# Patient Record
Sex: MALE | Race: WHITE | NOT HISPANIC OR LATINO | URBAN - METROPOLITAN AREA
[De-identification: names, ages, dates, MRNs, and addresses within clinical notes are randomized per-mention and may not be internally consistent; named-entity substitution may affect disease eponyms.]

---

## 2023-08-18 PROBLEM — R14.0 ABDOMINAL DISTENTION: Status: ACTIVE | Noted: 2023-08-18

## 2023-08-18 PROBLEM — N20.0 NEPHROLITHIASIS: Status: ACTIVE | Noted: 2023-08-18

## 2023-08-18 PROBLEM — E11.65 HYPERGLYCEMIA DUE TO TYPE 2 DIABETES MELLITUS (MULTI): Status: ACTIVE | Noted: 2023-08-18

## 2023-08-18 PROBLEM — J45.909 ASTHMA (HHS-HCC): Status: ACTIVE | Noted: 2023-08-18

## 2023-08-18 PROBLEM — K57.90 DIVERTICULOSIS: Status: ACTIVE | Noted: 2023-08-18

## 2023-08-18 PROBLEM — J20.9 ACUTE BRONCHITIS: Status: ACTIVE | Noted: 2023-08-18

## 2023-08-18 PROBLEM — F32.A DEPRESSION: Status: ACTIVE | Noted: 2023-08-18

## 2023-08-18 PROBLEM — K59.00 CONSTIPATION: Status: ACTIVE | Noted: 2023-08-18

## 2023-08-18 PROBLEM — B35.4 TINEA CORPORIS: Status: ACTIVE | Noted: 2023-08-18

## 2023-08-18 PROBLEM — K21.9 GASTROESOPHAGEAL REFLUX DISEASE WITHOUT ESOPHAGITIS: Status: ACTIVE | Noted: 2023-08-18

## 2023-08-18 PROBLEM — M54.41 ACUTE BILATERAL LOW BACK PAIN WITH RIGHT-SIDED SCIATICA: Status: ACTIVE | Noted: 2023-08-18

## 2023-08-18 PROBLEM — R10.9 ABDOMINAL PAIN: Status: ACTIVE | Noted: 2023-08-18

## 2023-08-18 PROBLEM — F41.9 ANXIETY: Status: ACTIVE | Noted: 2023-08-18

## 2023-08-18 PROBLEM — H65.90 OTITIS, SEROUS: Status: ACTIVE | Noted: 2023-08-18

## 2023-08-18 PROBLEM — R06.09 DYSPNEA ON EXERTION: Status: ACTIVE | Noted: 2023-08-18

## 2023-08-18 PROBLEM — E78.2 MIXED HYPERLIPIDEMIA: Status: ACTIVE | Noted: 2023-08-18

## 2023-08-18 PROBLEM — R00.2 HEART PALPITATIONS: Status: ACTIVE | Noted: 2023-08-18

## 2023-08-18 PROBLEM — K57.92 DIVERTICULITIS: Status: ACTIVE | Noted: 2023-08-18

## 2023-08-18 PROBLEM — J01.40 ACUTE PANSINUSITIS: Status: ACTIVE | Noted: 2023-08-18

## 2023-08-18 RX ORDER — ALBUTEROL SULFATE 90 UG/1
2 AEROSOL, METERED RESPIRATORY (INHALATION) EVERY 4 HOURS PRN
COMMUNITY
Start: 2018-10-22 | End: 2023-10-03

## 2023-08-18 RX ORDER — VITAMIN E (DL,TOCOPHERYL ACET) 90 MG
1 CAPSULE ORAL DAILY
COMMUNITY
End: 2023-10-03

## 2023-08-18 RX ORDER — FLUOXETINE HYDROCHLORIDE 40 MG/1
40 CAPSULE ORAL DAILY
COMMUNITY
End: 2023-10-03

## 2023-08-18 RX ORDER — MONTELUKAST SODIUM 10 MG/1
10 TABLET ORAL
Status: ON HOLD | COMMUNITY
Start: 2016-11-11 | End: 2024-02-07

## 2023-08-18 RX ORDER — METFORMIN HYDROCHLORIDE 500 MG/1
1 TABLET ORAL
COMMUNITY
Start: 2017-09-15 | End: 2023-10-03

## 2023-08-18 RX ORDER — SOD SULF/POT CHLORIDE/MAG SULF 1.479 G
TABLET ORAL
COMMUNITY
Start: 2022-12-14 | End: 2023-10-03

## 2023-08-18 RX ORDER — LISINOPRIL 5 MG/1
5 TABLET ORAL DAILY
COMMUNITY
Start: 2022-10-26 | End: 2023-10-03

## 2023-08-18 RX ORDER — ZOLPIDEM TARTRATE 5 MG/1
TABLET ORAL
COMMUNITY
Start: 2022-08-30 | End: 2023-10-03

## 2023-08-18 RX ORDER — ATORVASTATIN CALCIUM 20 MG/1
20 TABLET, FILM COATED ORAL DAILY
COMMUNITY
Start: 2016-11-11

## 2023-08-18 RX ORDER — METFORMIN HYDROCHLORIDE 500 MG/1
1000 TABLET, EXTENDED RELEASE ORAL
COMMUNITY
Start: 2023-04-04 | End: 2023-10-03

## 2023-08-18 RX ORDER — DULAGLUTIDE 0.75 MG/.5ML
INJECTION, SOLUTION SUBCUTANEOUS
COMMUNITY
Start: 2022-09-27 | End: 2023-10-03

## 2023-08-18 RX ORDER — GLIPIZIDE 10 MG/1
10 TABLET, FILM COATED, EXTENDED RELEASE ORAL
COMMUNITY
Start: 2023-04-28 | End: 2023-10-03

## 2023-08-18 RX ORDER — OXYCODONE AND ACETAMINOPHEN 5; 325 MG/1; MG/1
1 TABLET ORAL
COMMUNITY
Start: 2023-06-08 | End: 2023-10-03

## 2023-08-18 RX ORDER — CLORAZEPATE DIPOTASSIUM 15 MG/1
.5-1 TABLET ORAL EVERY 6 HOURS PRN
COMMUNITY
Start: 2016-11-11 | End: 2023-10-03

## 2023-08-18 RX ORDER — OMEPRAZOLE 20 MG/1
1 TABLET, DELAYED RELEASE ORAL DAILY
COMMUNITY

## 2023-08-18 RX ORDER — TRAZODONE HYDROCHLORIDE 50 MG/1
50 TABLET ORAL DAILY
COMMUNITY
End: 2023-10-31 | Stop reason: SDUPTHER

## 2023-08-18 RX ORDER — FLUTICASONE PROPIONATE AND SALMETEROL 100; 50 UG/1; UG/1
POWDER RESPIRATORY (INHALATION)
COMMUNITY
Start: 2022-08-31 | End: 2023-10-03

## 2023-08-18 RX ORDER — FLUOXETINE HYDROCHLORIDE 20 MG/1
CAPSULE ORAL
COMMUNITY
Start: 2022-08-31 | End: 2023-10-03

## 2023-08-18 RX ORDER — OMEPRAZOLE 20 MG/1
20 CAPSULE, DELAYED RELEASE ORAL
COMMUNITY
End: 2023-10-03

## 2023-08-18 RX ORDER — LORAZEPAM 0.5 MG/1
0.5 TABLET ORAL DAILY PRN
COMMUNITY
Start: 2023-04-04 | End: 2023-10-03

## 2023-08-18 RX ORDER — FLUTICASONE PROPIONATE AND SALMETEROL 250; 50 UG/1; UG/1
1 POWDER RESPIRATORY (INHALATION)
COMMUNITY
End: 2023-12-18

## 2023-08-18 RX ORDER — ATORVASTATIN CALCIUM 20 MG/1
20 TABLET, FILM COATED ORAL DAILY
COMMUNITY
End: 2023-10-03

## 2023-08-18 RX ORDER — LORATADINE 10 MG/1
CAPSULE, LIQUID FILLED ORAL
COMMUNITY
Start: 2022-08-31 | End: 2023-10-03

## 2023-08-18 RX ORDER — ACETAMINOPHEN 160 MG/5ML
400 SUSPENSION, ORAL (FINAL DOSE FORM) ORAL DAILY
Status: ON HOLD | COMMUNITY
End: 2024-02-08 | Stop reason: ALTCHOICE

## 2023-09-06 ENCOUNTER — HOSPITAL ENCOUNTER (OUTPATIENT)
Dept: DATA CONVERSION | Facility: HOSPITAL | Age: 58
Discharge: OTHER INSTITUTION | End: 2023-09-06
Payer: COMMERCIAL

## 2023-09-06 DIAGNOSIS — E16.2 HYPOGLYCEMIA, UNSPECIFIED: ICD-10-CM

## 2023-09-06 LAB — GLUCOSE BLD STRIP.AUTO-MCNC: 159 MG/DL (ref 65–99)

## 2023-09-10 RX ORDER — CIPROFLOXACIN 500 MG/1
500 TABLET ORAL 2 TIMES DAILY
COMMUNITY
Start: 2023-07-25 | End: 2023-10-03

## 2023-09-10 RX ORDER — GLIMEPIRIDE 2 MG/1
1 TABLET ORAL DAILY
COMMUNITY
Start: 2023-08-16

## 2023-10-03 ENCOUNTER — APPOINTMENT (OUTPATIENT)
Dept: PRIMARY CARE | Facility: CLINIC | Age: 58
End: 2023-10-03
Payer: COMMERCIAL

## 2023-10-03 ENCOUNTER — OFFICE VISIT (OUTPATIENT)
Dept: PRIMARY CARE | Facility: CLINIC | Age: 58
End: 2023-10-03
Payer: COMMERCIAL

## 2023-10-03 VITALS
HEIGHT: 72 IN | DIASTOLIC BLOOD PRESSURE: 80 MMHG | OXYGEN SATURATION: 98 % | SYSTOLIC BLOOD PRESSURE: 142 MMHG | BODY MASS INDEX: 34.64 KG/M2 | HEART RATE: 97 BPM | WEIGHT: 255.73 LBS

## 2023-10-03 DIAGNOSIS — F32.A DEPRESSION, UNSPECIFIED DEPRESSION TYPE: ICD-10-CM

## 2023-10-03 DIAGNOSIS — N52.9 ERECTILE DYSFUNCTION, UNSPECIFIED ERECTILE DYSFUNCTION TYPE: ICD-10-CM

## 2023-10-03 DIAGNOSIS — I10 BENIGN ESSENTIAL HTN: ICD-10-CM

## 2023-10-03 DIAGNOSIS — E11.65 TYPE 2 DIABETES MELLITUS WITH HYPERGLYCEMIA, WITHOUT LONG-TERM CURRENT USE OF INSULIN (MULTI): ICD-10-CM

## 2023-10-03 DIAGNOSIS — Z00.00 ANNUAL PHYSICAL EXAM: Primary | ICD-10-CM

## 2023-10-03 DIAGNOSIS — F41.9 ANXIETY: ICD-10-CM

## 2023-10-03 PROCEDURE — 99215 OFFICE O/P EST HI 40 MIN: CPT | Performed by: NURSE PRACTITIONER

## 2023-10-03 PROCEDURE — 3077F SYST BP >= 140 MM HG: CPT | Performed by: NURSE PRACTITIONER

## 2023-10-03 PROCEDURE — 3079F DIAST BP 80-89 MM HG: CPT | Performed by: NURSE PRACTITIONER

## 2023-10-03 PROCEDURE — 4010F ACE/ARB THERAPY RXD/TAKEN: CPT | Performed by: NURSE PRACTITIONER

## 2023-10-03 PROCEDURE — 1036F TOBACCO NON-USER: CPT | Performed by: NURSE PRACTITIONER

## 2023-10-03 RX ORDER — FLUOXETINE HYDROCHLORIDE 20 MG/1
20 CAPSULE ORAL DAILY
COMMUNITY
End: 2023-10-31 | Stop reason: SDUPTHER

## 2023-10-03 RX ORDER — TADALAFIL 10 MG/1
10 TABLET ORAL DAILY PRN
Qty: 12 TABLET | Refills: 3 | Status: SHIPPED | OUTPATIENT
Start: 2023-10-03 | End: 2023-11-28 | Stop reason: WASHOUT

## 2023-10-03 RX ORDER — LISINOPRIL 5 MG/1
5 TABLET ORAL DAILY
Qty: 90 TABLET | Refills: 3 | Status: SHIPPED | OUTPATIENT
Start: 2023-10-03 | End: 2024-01-25 | Stop reason: WASHOUT

## 2023-10-03 ASSESSMENT — ENCOUNTER SYMPTOMS
DYSURIA: 0
PALPITATIONS: 0
SEIZURES: 0
EYE PAIN: 0
ACTIVITY CHANGE: 0
RHINORRHEA: 0
DIARRHEA: 0
DYSPHORIC MOOD: 0
ENDOCRINE NEGATIVE: 1
BLOOD IN STOOL: 0
FEVER: 0
CONSTIPATION: 0
WOUND: 0
DIZZINESS: 0
FREQUENCY: 0
SLEEP DISTURBANCE: 0
NAUSEA: 0
ABDOMINAL PAIN: 0
UNEXPECTED WEIGHT CHANGE: 0
VOMITING: 0
SORE THROAT: 0
JOINT SWELLING: 0
SHORTNESS OF BREATH: 0
COUGH: 0
HEMATURIA: 0

## 2023-10-03 NOTE — PROGRESS NOTES
Subjective   Patient ID: Shaheen Ambriz is a 58 y.o. male who presents for Establish Care. He is doing well, no concerns.     HPI   He is complaining of some ED. Difficulty obtaining erection. Denies any painful urination, blood in the urine.   DMT2: followed by endo Dr. Hunt.   Diverticulosis: Followed by Dr. Craig. Recent colonoscopy stable. 4 Diverticulitis episodes since 10/2022. Diet changes and ducolax daily to keep bowels regular.   SOCIAL: Living at home with wife. 2 adult children. 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    Review of Systems   Constitutional:  Negative for activity change, fever and unexpected weight change.   HENT:  Negative for ear pain, hearing loss, rhinorrhea and sore throat.    Eyes:  Negative for pain and visual disturbance.   Respiratory:  Negative for cough and shortness of breath.    Cardiovascular:  Negative for chest pain, palpitations and leg swelling.   Gastrointestinal:  Negative for abdominal pain, blood in stool, constipation, diarrhea, nausea and vomiting.   Endocrine: Negative.    Genitourinary:  Negative for dysuria, frequency and hematuria.   Musculoskeletal:  Negative for gait problem and joint swelling.   Skin:  Negative for rash and wound.   Allergic/Immunologic: Negative for immunocompromised state.   Neurological:  Negative for dizziness, seizures and syncope.   Psychiatric/Behavioral:  Negative for behavioral problems, dysphoric mood and sleep disturbance.      Objective   /80   Pulse 97   Ht 1.829 m (6')   Wt 116 kg (255 lb 11.7 oz)   SpO2 98%   BMI 34.68 kg/m²     Physical Exam  Constitutional:       Appearance: Normal appearance.   HENT:      Head: Normocephalic.      Right Ear: Tympanic membrane, ear canal and external ear normal.      Left Ear: Tympanic membrane, ear canal and external ear normal.      Nose: Nose normal.      Mouth/Throat:      Mouth: Mucous membranes are moist.      Pharynx: Oropharynx is clear.   Eyes:       Extraocular Movements: Extraocular movements intact.      Conjunctiva/sclera: Conjunctivae normal.      Pupils: Pupils are equal, round, and reactive to light.   Cardiovascular:      Rate and Rhythm: Normal rate and regular rhythm.      Pulses: Normal pulses.      Heart sounds: Normal heart sounds.   Pulmonary:      Effort: Pulmonary effort is normal.      Breath sounds: Normal breath sounds.   Abdominal:      General: Abdomen is flat. Bowel sounds are normal.      Palpations: Abdomen is soft.   Musculoskeletal:         General: Normal range of motion.   Skin:     General: Skin is warm and dry.   Neurological:      General: No focal deficit present.      Mental Status: He is alert and oriented to person, place, and time.   Psychiatric:         Mood and Affect: Mood normal.         Behavior: Behavior normal.       Assessment/Plan   Diagnoses and all orders for this visit:  Annual physical exam  Erectile dysfunction, unspecified erectile dysfunction type  -     Testosterone, total and free; Future - pt will be seeing endo next week   -     tadalafil (Cialis) 10 mg tablet; Take 1 tablet (10 mg) by mouth once daily as needed for erectile dysfunction (10 mg as a single dose =30 minutes prior to anticipated sexual activity; do not take more than once daily. Erectile function may be improved for up to 36 hours following a single dose).  Type 2 diabetes mellitus with hyperglycemia, without long-term current use of insulin (CMS/[facility])  -     Hemoglobin A1C; Future  Stable on glimepiride 2 mg every day   Depression, unspecified depression type, Anxiety: stable on prozac 20 mg every day   Benign essential HTN  -     lisinopril 5 mg tablet; Take 1 tablet (5 mg) by mouth once daily.  Health maintenance: colonoscopy due next 01/2028. PSA UTD 05/2023. Immunizations UTD - flu vac at work. Would like to get shingrix on Nov 3rd 4 pm.   FU annually for CPE due 05/2024

## 2023-10-06 DIAGNOSIS — Z23 IMMUNIZATION DUE: Primary | ICD-10-CM

## 2023-10-12 ENCOUNTER — LAB (OUTPATIENT)
Dept: LAB | Facility: LAB | Age: 58
End: 2023-10-12
Payer: COMMERCIAL

## 2023-10-12 DIAGNOSIS — N52.9 ERECTILE DYSFUNCTION, UNSPECIFIED ERECTILE DYSFUNCTION TYPE: ICD-10-CM

## 2023-10-12 DIAGNOSIS — E11.65 TYPE 2 DIABETES MELLITUS WITH HYPERGLYCEMIA, WITHOUT LONG-TERM CURRENT USE OF INSULIN (MULTI): ICD-10-CM

## 2023-10-12 LAB
EST. AVERAGE GLUCOSE BLD GHB EST-MCNC: 157 MG/DL
HBA1C MFR BLD: 7.1 %

## 2023-10-12 PROCEDURE — 36415 COLL VENOUS BLD VENIPUNCTURE: CPT

## 2023-10-12 PROCEDURE — 84402 ASSAY OF FREE TESTOSTERONE: CPT

## 2023-10-12 PROCEDURE — 83036 HEMOGLOBIN GLYCOSYLATED A1C: CPT

## 2023-10-13 ENCOUNTER — OFFICE VISIT (OUTPATIENT)
Dept: ENDOCRINOLOGY | Facility: CLINIC | Age: 58
End: 2023-10-13
Payer: COMMERCIAL

## 2023-10-13 VITALS — BODY MASS INDEX: 35.4 KG/M2 | SYSTOLIC BLOOD PRESSURE: 144 MMHG | DIASTOLIC BLOOD PRESSURE: 86 MMHG | WEIGHT: 261 LBS

## 2023-10-13 DIAGNOSIS — E78.2 MIXED HYPERLIPIDEMIA: ICD-10-CM

## 2023-10-13 DIAGNOSIS — E11.9 TYPE 2 DIABETES MELLITUS WITHOUT COMPLICATION, WITHOUT LONG-TERM CURRENT USE OF INSULIN (MULTI): Primary | ICD-10-CM

## 2023-10-13 DIAGNOSIS — I10 BENIGN ESSENTIAL HTN: ICD-10-CM

## 2023-10-13 PROCEDURE — 3077F SYST BP >= 140 MM HG: CPT | Performed by: INTERNAL MEDICINE

## 2023-10-13 PROCEDURE — 3051F HG A1C>EQUAL 7.0%<8.0%: CPT | Performed by: INTERNAL MEDICINE

## 2023-10-13 PROCEDURE — 4010F ACE/ARB THERAPY RXD/TAKEN: CPT | Performed by: INTERNAL MEDICINE

## 2023-10-13 PROCEDURE — 3079F DIAST BP 80-89 MM HG: CPT | Performed by: INTERNAL MEDICINE

## 2023-10-13 PROCEDURE — 99214 OFFICE O/P EST MOD 30 MIN: CPT | Performed by: INTERNAL MEDICINE

## 2023-10-13 PROCEDURE — 1036F TOBACCO NON-USER: CPT | Performed by: INTERNAL MEDICINE

## 2023-10-13 NOTE — PROGRESS NOTES
Patient ID: Shaheen Ambriz is a 58 y.o. male who presents for No chief complaint on file..  HPI  The patient comes in for follow up.    He has type 2 diabetes with no complications hypertension hyperlipidemia.    At the last appointment we tried metformin ER which she did not tolerate.    He had been on Jardiance but did not restart it.    He has been intolerant to Trulicity because of GI side effects.    He had been on glipizide ER 10 mg which caused a lot of hypoglycemia.    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    We added in glimepiride 2 mg which she has tolerated and has had no hypoglycemia with.    He has not been consistently checking his blood sugars.    He is going to be starting on lisinopril 5 mg through his primary care doctor.    Had a hemoglobin A1c of 7.1 yesterday.      ROS  Comprehensive review of systems is negative.    Objective   Physical Exam  Weight 261 up 9 pounds    ENT normal. No adenopathy  Fundi normal  Thyroid palpable and normal. No nodules  Chest clear to auscultation  Heart sounds are normal  Abdomen nontender. Bowel sounds normal. No organomegaly  Feet are okay  Normal vibration and monofilament sensation normal pulses, no lesions    Assessment/Plan     1.  Type 2 diabetes  2.  Hypertension  3.  Hyperlipidemia    We reviewed his hemoglobin A1c.    He will continue work on diet and exercise.    We will add back in Jardiance 25 mg.    We will decrease the glimepiride to 1/2 tablet/day.    He will keep an eye on his blood sugars.    We will watch for hypoglycemia.    He will follow-up with me in 3 months sooner as needed.

## 2023-10-17 LAB
TESTOSTERONE FREE (CHAN): 53.6 PG/ML (ref 35–155)
TESTOSTERONE,TOTAL,LC-MS/MS: 259 NG/DL (ref 250–1100)

## 2023-10-31 ENCOUNTER — PATIENT MESSAGE (OUTPATIENT)
Dept: PRIMARY CARE | Facility: CLINIC | Age: 58
End: 2023-10-31

## 2023-10-31 DIAGNOSIS — F32.A DEPRESSION, UNSPECIFIED DEPRESSION TYPE: ICD-10-CM

## 2023-10-31 DIAGNOSIS — F32.A DEPRESSION, UNSPECIFIED DEPRESSION TYPE: Primary | ICD-10-CM

## 2023-10-31 RX ORDER — TRAZODONE HYDROCHLORIDE 50 MG/1
50 TABLET ORAL DAILY
Qty: 90 TABLET | Refills: 0 | Status: SHIPPED | OUTPATIENT
Start: 2023-10-31 | End: 2024-01-29

## 2023-10-31 RX ORDER — FLUOXETINE HYDROCHLORIDE 20 MG/1
20 CAPSULE ORAL DAILY
Qty: 90 CAPSULE | Refills: 0 | Status: SHIPPED | OUTPATIENT
Start: 2023-10-31 | End: 2023-12-18 | Stop reason: SDUPTHER

## 2023-11-03 ENCOUNTER — APPOINTMENT (OUTPATIENT)
Dept: PRIMARY CARE | Facility: CLINIC | Age: 58
End: 2023-11-03
Payer: COMMERCIAL

## 2023-11-08 ENCOUNTER — OFFICE VISIT (OUTPATIENT)
Dept: PRIMARY CARE | Facility: CLINIC | Age: 58
End: 2023-11-08
Payer: COMMERCIAL

## 2023-11-08 VITALS
HEART RATE: 87 BPM | WEIGHT: 251.32 LBS | OXYGEN SATURATION: 99 % | DIASTOLIC BLOOD PRESSURE: 78 MMHG | BODY MASS INDEX: 34.09 KG/M2 | SYSTOLIC BLOOD PRESSURE: 132 MMHG

## 2023-11-08 DIAGNOSIS — K57.32 DIVERTICULITIS OF COLON: Primary | ICD-10-CM

## 2023-11-08 PROCEDURE — 3078F DIAST BP <80 MM HG: CPT | Performed by: NURSE PRACTITIONER

## 2023-11-08 PROCEDURE — 3075F SYST BP GE 130 - 139MM HG: CPT | Performed by: NURSE PRACTITIONER

## 2023-11-08 PROCEDURE — 99213 OFFICE O/P EST LOW 20 MIN: CPT | Performed by: NURSE PRACTITIONER

## 2023-11-08 PROCEDURE — 4010F ACE/ARB THERAPY RXD/TAKEN: CPT | Performed by: NURSE PRACTITIONER

## 2023-11-08 PROCEDURE — 1036F TOBACCO NON-USER: CPT | Performed by: NURSE PRACTITIONER

## 2023-11-08 PROCEDURE — 3051F HG A1C>EQUAL 7.0%<8.0%: CPT | Performed by: NURSE PRACTITIONER

## 2023-11-08 ASSESSMENT — ENCOUNTER SYMPTOMS
WOUND: 0
ENDOCRINE NEGATIVE: 1
SORE THROAT: 0
PALPITATIONS: 0
HEMATURIA: 0
ABDOMINAL PAIN: 0
RHINORRHEA: 0
DYSPHORIC MOOD: 0
COUGH: 0
SEIZURES: 0
ACTIVITY CHANGE: 0
CONSTIPATION: 0
DYSURIA: 0
SHORTNESS OF BREATH: 0
DIARRHEA: 0
EYE PAIN: 0
VOMITING: 0
FEVER: 0
FREQUENCY: 0
JOINT SWELLING: 0
DIZZINESS: 0
UNEXPECTED WEIGHT CHANGE: 0
BLOOD IN STOOL: 0
SLEEP DISTURBANCE: 0
NAUSEA: 0

## 2023-11-08 NOTE — PROGRESS NOTES
Subjective   Patient ID: Shaheen Ambriz is a 58 y.o. male who presents for 7th diverticulitis flare in the past year. Symptoms started 11/2/2023, taking flagyl and Cipro, no blood in stool, symptoms are improving. Followed by Dr. Josue Craig. Colonoscopy done 01/26/2023. He has not done well with high fiber diet due to constipation.     HPI   He is complaining of some ED. Difficulty obtaining erection. Denies any painful urination, blood in the urine.   DMT2: followed by endo Dr. Hunt.   Diverticulosis: Followed by Dr. Craig. Recent colonoscopy stable. 4 Diverticulitis episodes since 10/2022. Diet changes and ducolax daily to keep bowels regular.   SOCIAL: Living at home with wife. 2 adult children. 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    Review of Systems   Constitutional:  Negative for activity change, fever and unexpected weight change.   HENT:  Negative for ear pain, hearing loss, rhinorrhea and sore throat.    Eyes:  Negative for pain and visual disturbance.   Respiratory:  Negative for cough and shortness of breath.    Cardiovascular:  Negative for chest pain, palpitations and leg swelling.   Gastrointestinal:  Negative for abdominal pain, blood in stool, constipation, diarrhea, nausea and vomiting.   Endocrine: Negative.    Genitourinary:  Negative for dysuria, frequency and hematuria.   Musculoskeletal:  Negative for gait problem and joint swelling.   Skin:  Negative for rash and wound.   Allergic/Immunologic: Negative for immunocompromised state.   Neurological:  Negative for dizziness, seizures and syncope.   Psychiatric/Behavioral:  Negative for behavioral problems, dysphoric mood and sleep disturbance.      Objective   Vitals:    11/08/23 1259   BP: 132/78   Pulse: 87   SpO2: 99%   Weight: 114 kg (251 lb 5.2 oz)        Physical Exam  Constitutional:       Appearance: Normal appearance.   HENT:      Head: Normocephalic.      Right Ear: Tympanic membrane, ear canal and external  ear normal.      Left Ear: Tympanic membrane, ear canal and external ear normal.      Nose: Nose normal.      Mouth/Throat:      Mouth: Mucous membranes are moist.      Pharynx: Oropharynx is clear.   Eyes:      Extraocular Movements: Extraocular movements intact.      Conjunctiva/sclera: Conjunctivae normal.      Pupils: Pupils are equal, round, and reactive to light.   Cardiovascular:      Rate and Rhythm: Normal rate and regular rhythm.      Pulses: Normal pulses.      Heart sounds: Normal heart sounds.   Pulmonary:      Effort: Pulmonary effort is normal.      Breath sounds: Normal breath sounds.   Abdominal:      General: Abdomen is flat. Bowel sounds are normal.      Palpations: Abdomen is soft.   Musculoskeletal:         General: Normal range of motion.   Skin:     General: Skin is warm and dry.   Neurological:      General: No focal deficit present.      Mental Status: He is alert and oriented to person, place, and time.   Psychiatric:         Mood and Affect: Mood normal.         Behavior: Behavior normal.       Assessment/Plan   Diagnoses and all orders for this visit:  1. Diverticulitis of colon  Continue med regimen. Email sent to Dr. Craig.  - Referral to Colorectal Surgery; Future   Erectile dysfunction, unspecified erectile dysfunction type  -     Testosterone, total and free; Future - pt will be seeing endo next week   -     tadalafil (Cialis) 10 mg tablet; Take 1 tablet (10 mg) by mouth once daily as needed for erectile dysfunction (10 mg as a single dose =30 minutes prior to anticipated sexual activity; do not take more than once daily. Erectile function may be improved for up to 36 hours following a single dose).  Type 2 diabetes mellitus with hyperglycemia, without long-term current use of insulin (CMS/[facility])  -     Hemoglobin A1C 7.1%  Stable on glimepiride 2 mg every day   Depression, unspecified depression type, Anxiety: stable on prozac 20 mg every day   Benign essential HTN  -     lisinopril  5 mg tablet; Take 1 tablet (5 mg) by mouth once daily.  Health maintenance: colonoscopy due next 01/2028. PSA UTD 05/2023. Would like to get shingrix on Nov 3rd 4 pm.   FU annually for CPE due 05/2024

## 2023-11-20 NOTE — PROGRESS NOTES
History Of Present Illness  Shaheen Ambriz is a 58 y.o. male referred by Yesy De La Torre, APRN-CNP, for evaluation of recurrent diverticulitis.    9/20/2014 CT ABD/PELVIS WO CONTRAST  Fatty infiltration liver.  Suspicion of mild changes of sigmoid diverticulitis. Please correlate clinically.  Punctate nonobstructing calyceal stones left kidney suspected.  No right-sided urinary tract stones are identified. No hydronephrosis.  Probable left renal cyst.  Small mesenteric fat containing umbilical hernia.    3/29/2017 CT ABD/Pelvis for RLQ abdominal pain  An explanation for the current clinical complaint is not  identified.  2 mm nonobstructing calculus left lower pole kidney.  Left renal cyst.  Sigmoid diverticulosis.    6/18/2018 CT abd pelvis for right sided pain  Minute nonobstructing calculus in the upper pole of the left  kidney without hydronephrosis. Colonic diverticulosis without acute  diverticulitis. Small fat-containing umbilical hernia. Hepatic steatosis.      9/08/2022 CT ABD/PELVIS WO CONTRAST  No acute abdominal or pelvic abnormalities noted.  Fatty liver.  5 cm left renal cyst with multiple bilateral nonobstructing intrarenal calculi.  Radiographically uncomplicated colonic diverticulosis.   Stable 5 mm pleural-based nodule right lower lobe.    1/26/2023 COLONOSCOPY TO CECUM  The perianal and digital rectal exams were normal.  A 4 mm polyp was found in the transverse colon. The polyp was sessile.  The polyp was removed with a cold snare.  Resection and retrieval were complete.  A diminutive polyp was found in the ascending colon. The polyp was removed with a cold biopsy. Resection and retrieval were complete.    Multiple medium mouthed diverticula were found in the sigmoid colon and descending colon.  Non-bleeding internal hemorrhoids were found during retroflexion.  The hemorrhoids were small.  FINAL DIAGNOSIS   A.  COLON, TRANSVERSE, POLYP, COLD SNARE, POLYPECTOMY:    - TUBULAR ADENOMA INVOLVING  MULTIPLE FRAGMENTS     B.  COLON, ASCENDING, POLYP, COLD FORCEP, POLYPECTOMY:    - TUBULAR ADENOMA        7/24/2023 CT ABD/PELVIS WO CONTRAST  Prominent diverticulosis of the sigmoid colon. Mild acute inflammatory stranding in the mid sigmoid colon consistent with mild acute diverticulitis.   Punctate nonobstructive renal calculi bilaterally. No hydronephrosis.     Reports at least 7-8 flares within the last year.  This usually consists of abdominal bloating and pain in the lower pelvis with constipation and straining and occasional mucus per rectum.  Surgical history significant for an open umbilical hernia repair with a mesh within the last 2 years.  These episodes are limiting his quality of life.  He has fear of eating, he is fear of traveling and activity.  His pain limits his enjoyment.  Past Medical History  Diabetes  Depression  HTN  Diverticulitis    Surgical History       Social History  Smoking: Denies  ETOH:      Family History  Mother: Pancreatic Cancer     Allergies  Dulaglutide, Iodinated contrast media, Morphine, Metformin hcl, and Theophylline    Review of systems:  Constitutional: Negative for fever, chills, anorexia, weight loss, malaise     ENMT: Negative for nasal discharge, congestion, ear pain, mouth pain, throat pain     Respiratory: Negative for cough, hemoptysis, wheezing, shortness of breath     Cardiac: Negative for chest pain, dyspnea on exertion, orthopnea, palpitations, syncope     Gastrointestinal: Negative for nausea, vomiting, diarrhea, constipation, abdominal pain,     Genitourinary: Negative for discharge, dysuria, flank pain, frequency, hematuria     Musculoskeletal: Negative for decreased ROM, pain, swelling, weakness     Neurological: Negative for dizziness, confusion, headache, seizures, syncope     Psychiatric: Negative for mood changes, anxiety, hallucinations, sleep changes, suicidal ideas     Skin: Negative for mass, pain, itching, rash, ulcer     Endocrine: Negative for  heat intolerance, cold intolerance, excessive sweating, polyuria, excess thirst     Hematologic/Lymph: Negative for anemia, bruising, easy bleeding, night sweats, petechiae, history of DVT/PE or cancer     Allergic/Immunologic: Negative for anaphylaxis, itchy/ teary eyes, itching, sneezing, swelling        Physical Exam:  Physical Exam  Constitutional:       Appearance: Normal appearance.   HENT:      Head: Normocephalic.   Eyes:      Pupils: Pupils are equal, round, and reactive to light.   Cardiovascular:      Rate and Rhythm: Normal rate.   Pulmonary:      Effort: Pulmonary effort is normal.   Abdominal:      General: Abdomen is flat. Bowel sounds are normal.      Palpations: Abdomen is soft.      Comments: Mildly obese, inferior periumbilical incision well-healed without evidence of recurrent hernia.   Skin:     General: Skin is warm.   Neurological:      General: No focal deficit present.      Mental Status: He is alert.              Assessment/Plan   Problem List Items Addressed This Visit             ICD-10-CM       Cardiac and Vasculature    Dyspnea on exertion R06.09       Endocrine/Metabolic    Type 2 diabetes mellitus without complication, without long-term current use of insulin (CMS/[facility]) E11.9    BMI 34.0-34.9,adult Z68.34       Gastrointestinal and Abdominal    Diverticulitis of large intestine without perforation or abscess without bleeding - Primary K57.32       Recurrent uncomplicated diverticulitis causing significant quality-of-life issues.  At least 2 CT confirmed flares with multiple none diagnosed flares intermittently.  Plan for laparoscopic sigmoid colectomy.  Risks and benefits of surgery were discussed with the patient including, but not limited to: conversion to an open procedure, infection, bleeding, wound healing issues, anastomotic leak or stricture, damage to surrounding structures (including the ureters, nerves, and major blood vessels), change in bowel habits, ostomy, incontinence,  impotence, risks with anesthesia,  blood clot, heart attack, stroke, COVID infection, and death.

## 2023-11-28 ENCOUNTER — OFFICE VISIT (OUTPATIENT)
Dept: SURGERY | Facility: CLINIC | Age: 58
End: 2023-11-28
Payer: COMMERCIAL

## 2023-11-28 VITALS
DIASTOLIC BLOOD PRESSURE: 81 MMHG | WEIGHT: 258 LBS | HEART RATE: 84 BPM | BODY MASS INDEX: 34.99 KG/M2 | TEMPERATURE: 99.5 F | SYSTOLIC BLOOD PRESSURE: 132 MMHG

## 2023-11-28 DIAGNOSIS — E11.9 TYPE 2 DIABETES MELLITUS WITHOUT COMPLICATION, WITHOUT LONG-TERM CURRENT USE OF INSULIN (MULTI): ICD-10-CM

## 2023-11-28 DIAGNOSIS — K57.92 DIVERTICULITIS: ICD-10-CM

## 2023-11-28 DIAGNOSIS — R06.09 DYSPNEA ON EXERTION: ICD-10-CM

## 2023-11-28 DIAGNOSIS — K57.32 DIVERTICULITIS OF COLON: ICD-10-CM

## 2023-11-28 DIAGNOSIS — K57.32 DIVERTICULITIS OF LARGE INTESTINE WITHOUT PERFORATION OR ABSCESS WITHOUT BLEEDING: Primary | ICD-10-CM

## 2023-11-28 PROCEDURE — 99215 OFFICE O/P EST HI 40 MIN: CPT | Performed by: SURGERY

## 2023-11-28 PROCEDURE — 3008F BODY MASS INDEX DOCD: CPT | Performed by: SURGERY

## 2023-11-28 PROCEDURE — 1036F TOBACCO NON-USER: CPT | Performed by: SURGERY

## 2023-11-28 PROCEDURE — 99205 OFFICE O/P NEW HI 60 MIN: CPT | Performed by: SURGERY

## 2023-11-28 PROCEDURE — 3075F SYST BP GE 130 - 139MM HG: CPT | Performed by: SURGERY

## 2023-11-28 PROCEDURE — 4010F ACE/ARB THERAPY RXD/TAKEN: CPT | Performed by: SURGERY

## 2023-11-28 PROCEDURE — 3079F DIAST BP 80-89 MM HG: CPT | Performed by: SURGERY

## 2023-11-28 PROCEDURE — 3051F HG A1C>EQUAL 7.0%<8.0%: CPT | Performed by: SURGERY

## 2023-11-28 RX ORDER — ASPIRIN 81 MG/1
81 TABLET ORAL DAILY
COMMUNITY

## 2023-11-28 RX ORDER — METRONIDAZOLE 250 MG/1
TABLET ORAL
Qty: 3 TABLET | Refills: 0 | Status: SHIPPED | OUTPATIENT
Start: 2023-11-28 | End: 2024-02-10 | Stop reason: HOSPADM

## 2023-11-28 RX ORDER — CHLORHEXIDINE GLUCONATE ORAL RINSE 1.2 MG/ML
SOLUTION DENTAL
Qty: 120 ML | Refills: 0 | Status: SHIPPED | OUTPATIENT
Start: 2023-11-28 | End: 2024-02-10 | Stop reason: HOSPADM

## 2023-11-28 RX ORDER — GABAPENTIN 100 MG/1
CAPSULE ORAL
Qty: 3 CAPSULE | Refills: 0 | Status: SHIPPED | OUTPATIENT
Start: 2023-11-28 | End: 2024-02-10 | Stop reason: HOSPADM

## 2023-11-28 RX ORDER — NEOMYCIN SULFATE 500 MG/1
TABLET ORAL
Qty: 6 TABLET | Refills: 0 | Status: SHIPPED | OUTPATIENT
Start: 2023-11-28 | End: 2024-02-10 | Stop reason: HOSPADM

## 2023-11-28 ASSESSMENT — PAIN SCALES - GENERAL: PAINLEVEL: 0-NO PAIN

## 2023-12-16 DIAGNOSIS — J45.909 UNSPECIFIED ASTHMA, UNCOMPLICATED (HHS-HCC): ICD-10-CM

## 2023-12-18 RX ORDER — FLUOXETINE HYDROCHLORIDE 40 MG/1
40 CAPSULE ORAL DAILY
Qty: 90 CAPSULE | Refills: 1 | Status: SHIPPED | OUTPATIENT
Start: 2023-12-18 | End: 2024-06-15

## 2023-12-18 RX ORDER — FLUTICASONE PROPIONATE AND SALMETEROL 250; 50 UG/1; UG/1
POWDER RESPIRATORY (INHALATION)
Qty: 180 EACH | Refills: 2 | Status: SHIPPED | OUTPATIENT
Start: 2023-12-18

## 2023-12-27 ENCOUNTER — APPOINTMENT (OUTPATIENT)
Dept: SURGERY | Facility: CLINIC | Age: 58
End: 2023-12-27
Payer: COMMERCIAL

## 2024-01-05 ENCOUNTER — APPOINTMENT (OUTPATIENT)
Dept: GASTROENTEROLOGY | Facility: CLINIC | Age: 59
End: 2024-01-05
Payer: COMMERCIAL

## 2024-01-18 ENCOUNTER — TELEMEDICINE CLINICAL SUPPORT (OUTPATIENT)
Dept: PREADMISSION TESTING | Facility: HOSPITAL | Age: 59
End: 2024-01-18
Payer: COMMERCIAL

## 2024-01-18 RX ORDER — DOCUSATE SODIUM 100 MG/1
100 CAPSULE, LIQUID FILLED ORAL 2 TIMES DAILY
Status: ON HOLD | COMMUNITY
End: 2024-02-08 | Stop reason: ALTCHOICE

## 2024-01-18 RX ORDER — LORATADINE 10 MG/1
10 TABLET ORAL DAILY
COMMUNITY

## 2024-01-19 ENCOUNTER — APPOINTMENT (OUTPATIENT)
Dept: ENDOCRINOLOGY | Facility: CLINIC | Age: 59
End: 2024-01-19
Payer: COMMERCIAL

## 2024-01-25 ENCOUNTER — PRE-ADMISSION TESTING (OUTPATIENT)
Dept: PREADMISSION TESTING | Facility: HOSPITAL | Age: 59
End: 2024-01-25
Payer: COMMERCIAL

## 2024-01-25 ENCOUNTER — LAB (OUTPATIENT)
Dept: LAB | Facility: LAB | Age: 59
End: 2024-01-25
Payer: COMMERCIAL

## 2024-01-25 VITALS
WEIGHT: 261.47 LBS | RESPIRATION RATE: 18 BRPM | BODY MASS INDEX: 34.65 KG/M2 | SYSTOLIC BLOOD PRESSURE: 135 MMHG | OXYGEN SATURATION: 95 % | TEMPERATURE: 98.6 F | HEIGHT: 73 IN | HEART RATE: 92 BPM | DIASTOLIC BLOOD PRESSURE: 74 MMHG

## 2024-01-25 DIAGNOSIS — Z01.818 PREOP TESTING: Primary | ICD-10-CM

## 2024-01-25 DIAGNOSIS — Z01.818 PREOP TESTING: ICD-10-CM

## 2024-01-25 DIAGNOSIS — F32.A DEPRESSION, UNSPECIFIED DEPRESSION TYPE: ICD-10-CM

## 2024-01-25 LAB
ABO GROUP (TYPE) IN BLOOD: NORMAL
ANION GAP SERPL CALC-SCNC: 12 MMOL/L (ref 10–20)
ANTIBODY SCREEN: NORMAL
BASOPHILS # BLD AUTO: 0.07 X10*3/UL (ref 0–0.1)
BASOPHILS NFR BLD AUTO: 0.5 %
BUN SERPL-MCNC: 18 MG/DL (ref 6–23)
CALCIUM SERPL-MCNC: 9.6 MG/DL (ref 8.6–10.3)
CHLORIDE SERPL-SCNC: 104 MMOL/L (ref 98–107)
CO2 SERPL-SCNC: 27 MMOL/L (ref 21–32)
CREAT SERPL-MCNC: 1.23 MG/DL (ref 0.5–1.3)
EGFRCR SERPLBLD CKD-EPI 2021: 69 ML/MIN/1.73M*2
EOSINOPHIL # BLD AUTO: 0.18 X10*3/UL (ref 0–0.7)
EOSINOPHIL NFR BLD AUTO: 1.4 %
ERYTHROCYTE [DISTWIDTH] IN BLOOD BY AUTOMATED COUNT: 14.5 % (ref 11.5–14.5)
GLUCOSE SERPL-MCNC: 127 MG/DL (ref 74–99)
HCT VFR BLD AUTO: 45.3 % (ref 41–52)
HGB BLD-MCNC: 14.3 G/DL (ref 13.5–17.5)
IMM GRANULOCYTES # BLD AUTO: 0.04 X10*3/UL (ref 0–0.7)
IMM GRANULOCYTES NFR BLD AUTO: 0.3 % (ref 0–0.9)
LYMPHOCYTES # BLD AUTO: 2.76 X10*3/UL (ref 1.2–4.8)
LYMPHOCYTES NFR BLD AUTO: 21.6 %
MCH RBC QN AUTO: 26.8 PG (ref 26–34)
MCHC RBC AUTO-ENTMCNC: 31.6 G/DL (ref 32–36)
MCV RBC AUTO: 85 FL (ref 80–100)
MONOCYTES # BLD AUTO: 0.64 X10*3/UL (ref 0.1–1)
MONOCYTES NFR BLD AUTO: 5 %
NEUTROPHILS # BLD AUTO: 9.1 X10*3/UL (ref 1.2–7.7)
NEUTROPHILS NFR BLD AUTO: 71.2 %
NRBC BLD-RTO: 0 /100 WBCS (ref 0–0)
PLATELET # BLD AUTO: 468 X10*3/UL (ref 150–450)
POTASSIUM SERPL-SCNC: 4.2 MMOL/L (ref 3.5–5.3)
RBC # BLD AUTO: 5.33 X10*6/UL (ref 4.5–5.9)
RH FACTOR (ANTIGEN D): NORMAL
SODIUM SERPL-SCNC: 139 MMOL/L (ref 136–145)
WBC # BLD AUTO: 12.8 X10*3/UL (ref 4.4–11.3)

## 2024-01-25 PROCEDURE — 86900 BLOOD TYPING SEROLOGIC ABO: CPT

## 2024-01-25 PROCEDURE — 86901 BLOOD TYPING SEROLOGIC RH(D): CPT

## 2024-01-25 PROCEDURE — 86850 RBC ANTIBODY SCREEN: CPT

## 2024-01-25 PROCEDURE — 36415 COLL VENOUS BLD VENIPUNCTURE: CPT

## 2024-01-25 PROCEDURE — 93005 ELECTROCARDIOGRAM TRACING: CPT | Performed by: PHYSICIAN ASSISTANT

## 2024-01-25 PROCEDURE — 87081 CULTURE SCREEN ONLY: CPT | Mod: AHULAB | Performed by: PHYSICIAN ASSISTANT

## 2024-01-25 PROCEDURE — 85025 COMPLETE CBC W/AUTO DIFF WBC: CPT

## 2024-01-25 PROCEDURE — 99204 OFFICE O/P NEW MOD 45 MIN: CPT | Performed by: PHYSICIAN ASSISTANT

## 2024-01-25 PROCEDURE — 80048 BASIC METABOLIC PNL TOTAL CA: CPT

## 2024-01-25 ASSESSMENT — ENCOUNTER SYMPTOMS
ARTHRALGIAS: 0
NECK STIFFNESS: 0
PALPITATIONS: 0
ABDOMINAL DISTENTION: 0
CHILLS: 0
SKIN CHANGES: 0
EYE PAIN: 0
DYSPNEA AT REST: 0
DIFFICULTY URINATING: 0
HEMOPTYSIS: 0
LIMITED RANGE OF MOTION: 0
EYE DISCHARGE: 0
COUGH: 0
VISUAL CHANGE: 0
NAUSEA: 0
DYSPNEA WITH EXERTION: 0
DOUBLE VISION: 0
NUMBNESS: 0
TROUBLE SWALLOWING: 0
RHINORRHEA: 0
CONSTIPATION: 0
BRUISES/BLEEDS EASILY: 0
ABDOMINAL PAIN: 0
WHEEZING: 0
LIGHT-HEADEDNESS: 0
SINUS CONGESTION: 0
MYALGIAS: 0
FEVER: 0
CONFUSION: 0
VOMITING: 0
NECK PAIN: 0
SHORTNESS OF BREATH: 0
BLOOD IN STOOL: 0
WEAKNESS: 0
DYSURIA: 0
WOUND: 0
DIARRHEA: 1
EXCESSIVE BLEEDING: 0
UNEXPECTED WEIGHT CHANGE: 0
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
## 2024-01-25 NOTE — PREPROCEDURE INSTRUCTIONS
Medication List            Accurate as of January 25, 2024  2:36 PM. Always use your most recent med list.                albuterol sulfate 90 mcg/actuation aero powdr breath act w/sensor inhaler  Commonly known as: Proair Digihaler  Notes to patient: Ok to use morning of surgery if needed     aspirin 81 mg EC tablet  Medication Adjustments for Surgery: Take morning of surgery with sip of water, no other fluids     atorvastatin 20 mg tablet  Commonly known as: Lipitor  Medication Adjustments for Surgery: Take morning of surgery with sip of water, no other fluids     chlorhexidine 0.12 % solution  Commonly known as: Peridex  Rinse mouth with 15 ml after toothbrushing the night before surgery and on the morning of surgery.  Expectorate after rinsing.  Do not swallow.     coenzyme Q-10 200 mg capsule  Medication Adjustments for Surgery: Stop 7 days before surgery     docusate sodium 100 mg capsule  Commonly known as: Colace  Medication Adjustments for Surgery: Continue until night before surgery     FLUoxetine 40 mg capsule  Commonly known as: PROzac  Take 1 capsule (40 mg) by mouth once daily.  Medication Adjustments for Surgery: Take morning of surgery with sip of water, no other fluids     gabapentin 100 mg capsule  Commonly known as: Neurontin  Take one capsule by mouth starting three days before surgery at bedtime.  Medication Adjustments for Surgery: Take morning of surgery with sip of water, no other fluids     glimepiride 2 mg tablet  Commonly known as: Amaryl  Medication Adjustments for Surgery: Continue until night before surgery     loratadine 10 mg tablet  Commonly known as: Claritin  Medication Adjustments for Surgery: Take morning of surgery with sip of water, no other fluids     metroNIDAZOLE 250 mg tablet  Commonly known as: Flagyl  Take one tablet at 6p, 7p, and 11p the night before surgery.     montelukast 10 mg tablet  Commonly known as: Singulair  Medication Adjustments for Surgery: Take morning  of surgery with sip of water, no other fluids     neomycin 500 mg tablet  Commonly known as: Mycifradin  Take two tabs by mouth at 6p, 7pm, and 11p the night before surgery.     PriLOSEC OTC 20 mg EC tablet  Generic drug: omeprazole OTC  Medication Adjustments for Surgery: Take morning of surgery with sip of water, no other fluids     traZODone 50 mg tablet  Commonly known as: Desyrel  Take 1 tablet (50 mg) by mouth once daily.  Medication Adjustments for Surgery: Take morning of surgery with sip of water, no other fluids     Wixela Inhub 250-50 mcg/dose diskus inhaler  Generic drug: fluticasone propion-salmeteroL  INHALE 1 PUFF BY MOUTH TWICE A DAY INHALATION TWICE A DAY 90 DAYS  Notes to patient: Ok to use morning of surgery                          CONTACT SURGEON'S OFFICE IF YOU DEVELOP:  * Fever = 100.4 F   * New respiratory symptoms (e.g. cough, shortness of breath, respiratory distress, sore throat)  * Recent loss of taste or smell  *Flu like symptoms such as headache, fatigue or gastrointestinal symptoms  * You develop any open sores, shingles, burning or painful urination   AND/OR:  * You no longer wish to have the surgery.  * Any other personal circumstances change that may lead to the need to cancel or defer this surgery.  *You were admitted to any hospital within one week of your planned procedure.    SMOKING:  *Quitting smoking can make a huge difference to your health and recovery from surgery.    *If you need help with quitting, call 4-884-QUIT-NOW.    THE DAY BEFORE SURGERY:  *Do not eat any food after midnight the night before surgery.   *You are permitted to drink clear liquids (i.e. water, black coffee, tea, clear broth, apple juice) up to 2 hours before your surgery.    DIABETICS:  If diabetic, nothing by mouth (no solids or liquids) for 8 hours prior to surgery.   Please check fasting blood sugar upon waking up.  If fasting sugar is <80 mg/dl, please drink 100ml/3oz of apple juice no later than  2 hours prior to surgery.      SURGICAL TIME  *You will be contacted between 2 p.m. and 6 p.m. the business day before your surgery with your arrival time.  *If you haven't received a call by 6pm, call [phone].  *Scheduled surgery times may change and you will be notified if this occurs-check your personal voicemail for any updates.    ON THE MORNING OF SURGERY:  *Wear comfortable, loose fitting clothing.   *Do not use moisturizers, creams, lotions or perfume.  *All jewelry and valuables should be left at home.  *Prosthetic devices such as contact lenses, hearing aids, dentures, eyelash extensions, hairpins and body piercing must be removed before surgery.    BRING WITH YOU:  *Photo ID and insurance card  *Current list of medicines and allergies  *Pacemaker/Defibrillator/Heart stent cards  *CPAP machine and mask  *Slings/splints/crutches  *Copy of your complete Advanced Directive/DHPOA-if applicable  *Neurostimulator implant remote    PARKING AND ARRIVAL:  *Check in at the Main Entrance desk and let them know you are here for surgery.  *You will be directed to the 2nd floor surgical waiting area.    AFTER OUTPATIENT SURGERY:  *A responsible adult MUST accompany you at the time of discharge and stay with you for 24 hours after your surgery.  *You may NOT drive yourself home after surgery.  *You may use a taxi or ride sharing service ("[employer]) to return home ONLY if you are accompanied by a friend or family member.  *Instructions for resuming your medications will be provided by your surgeon.

## 2024-01-25 NOTE — CPM/PAT H&P
[facility]/PAT Evaluation       Name: Shaheen Ambriz (Shaheen Ambriz)  /Age: 1965/58 y.o.         Date of Consult: 24    Referring Provider: Dr. 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 Pt reports to 8 flare ups within the past 12 months. Most recent dED visit was at [facility] in 2023. He was provided with Cipro/Flagyl at time of flare up. No current LLQ pain;  No f/c/n/v.  No blood per rectum currently.  No mucus per rectum.  He is on dulcolax am and pm and has 4-5 BM per day.     Patient presents with a primary diagnosis of diverticulitis.     This note was created in part upon personal review of patient's medical records.      Patient is scheduled to have Laparoscopic Sigmoid Colon Resection      Pt denies any past history of anesthetic complications such as PONV, awareness, prolonged sedation, dental damage, aspiration, cardiac arrest, difficult intubation, difficult I.V. access or unexpected hospital admissions.  NO malignant hyperthermia and or pseudocholinesterase deficiency.  No history of blood transfusions     The patient is not a Shinto and will accept blood and blood products if medically indicated.   Type and screen sent.     Past Medical History:   Diagnosis Date    Anxiety     Asthma     triggers: dust, weather changes-last use 2023    Diabetes mellitus (CMS/[facility])     A1C=7.1 10/12/23    Diverticulosis     multiple diverticulitis flares in past year    Factor V Leiden (CMS/[facility])     daily ASA, no hematologist, has been tx w/ Lovenox in past post op    GERD (gastroesophageal reflux disease)     Hyperlipidemia     Hypertension     Nephrolithiasis     last     Sinusitis     r/t allergies    Syncope 2022    single episode, evaluated by Dr. Davey Garcia.  ECHO 22 WNL, Zio monitor WNL per  patient.  No recurrance of symptoms       Past Surgical History:   Procedure Laterality Date    KNEE SURGERY Right 06/2023    LITHOTRIPSY  2007    SHOULDER Left 06/2019    clavical decompression    UMBILICAL HERNIA REPAIR  12/2021    VASECTOMY  2006       Patient  has no history on file for sexual activity.    Family History   Problem Relation Name Age of Onset    Pancreatic cancer Mother      No Known Problems Father      No Known Problems Brother      Hypertension Other grandparent      Social History     Socioeconomic History    Marital status:      Spouse name: Not on file    Number of children: Not on file    Years of education: Not on file    Highest education level: Not on file   Occupational History    Not on file   Tobacco Use    Smoking status: Never    Smokeless tobacco: Never   Vaping Use    Vaping Use: Never used   Substance and Sexual Activity    Alcohol use: Never    Drug use: Never    Sexual activity: Not on file   Other Topics Concern    Not on file   Social History Narrative    Not on file     Social Determinants of Health     Financial Resource Strain: Not on file   Food Insecurity: Not on file   Transportation Needs: Not on file   Physical Activity: Not on file   Stress: Not on file   Social Connections: Not on file   Intimate Partner Violence: Not on file   Housing Stability: Not on file        Allergies   Allergen Reactions    Dulaglutide Anxiety and GI Upset     Critically High    Iodinated Contrast Media Anaphylaxis, Hives and Itching    Metformin Hcl GI Upset    Theophylline Palpitations and GI Upset       Prior to Admission medications    Medication Sig Start Date End Date Taking? Authorizing Provider   aspirin 81 mg EC tablet Take 1 tablet (81 mg) by mouth once daily.   Yes Historical Provider, MD   atorvastatin (Lipitor) 20 mg tablet Take 1 tablet (20 mg) by mouth once daily. 11/11/16  Yes Historical Provider, MD   coenzyme Q-10 200 mg capsule Take 2 capsules (400 mg) by mouth once  daily.   Yes Historical Provider, MD   FLUoxetine (PROzac) 40 mg capsule Take 1 capsule (40 mg) by mouth once daily. 12/18/23 6/15/24 Yes TISHA Larsen   glimepiride (Amaryl) 2 mg tablet Take 1 tablet (2 mg) by mouth once daily. as directed 8/16/23  Yes Historical Provider, MD   loratadine (Claritin) 10 mg tablet Take 1 tablet (10 mg) by mouth once daily.   Yes Historical Provider, MD   montelukast (Singulair) 10 mg tablet Take 1 tablet (10 mg) by mouth once daily. 11/11/16  Yes Historical Provider, MD   omeprazole OTC (PriLOSEC OTC) 20 mg EC tablet Take 1 capsule by mouth once daily.   Yes Historical Provider, MD   traZODone (Desyrel) 50 mg tablet Take 1 tablet (50 mg) by mouth once daily.  Patient taking differently: Take 1 tablet (50 mg) by mouth once daily at bedtime. 10/31/23 1/29/24 Yes TISHA Larsen   Wixela Inhub 250-50 mcg/dose diskus inhaler INHALE 1 PUFF BY MOUTH TWICE A DAY INHALATION TWICE A DAY 90 DAYS 12/18/23  Yes Andrew Pop MD   albuterol sulfate (Proair Digihaler) 90 mcg/actuation aero powdr breath act w/sensor inhaler Inhale 2 puffs every 4 hours.    Historical Provider, MD   chlorhexidine (Peridex) 0.12 % solution Rinse mouth with 15 ml after toothbrushing the night before surgery and on the morning of surgery.  Expectorate after rinsing.  Do not swallow. 11/28/23   Raj Walton MD   docusate sodium (Colace) 100 mg capsule Take 1 capsule (100 mg) by mouth 2 times a day.    Historical Provider, MD   gabapentin (Neurontin) 100 mg capsule Take one capsule by mouth starting three days before surgery at bedtime.  Patient not taking: Reported on 1/25/2024 11/28/23   Raj Walton MD   metroNIDAZOLE (Flagyl) 250 mg tablet Take one tablet at 6p, 7p, and 11p the night before surgery. 11/28/23   Raj Walton MD   neomycin (Mycifradin) 500 mg tablet Take two tabs by mouth at 6p, 7pm, and 11p the night before surgery. 11/28/23   Raj Walton MD   lisinopril 5  mg tablet Take 1 tablet (5 mg) by mouth once daily.  Patient taking differently: Take 1 tablet (5 mg) by mouth once daily. PATIENT HASN'T BEEN TAKING 2/2 BP WNL 10/3/23 1/25/24  Yesy De La Torre, APRN-CNP        PAT ROS:   Constitutional:    no fever   no chills   no unexpected weight change  Neuro/Psych:    no numbness   no weakness   no light-headedness   no confusion  Eyes:    no discharge   no pain   no vision loss   no diplopia   no visual disturbance   use of corrective lenses  Ears:    no ear pain   no hearing loss   no tinnitus  Nose:    no nasal discharge   no sinus congestion   no epistaxis  Mouth:    no dental issues   no mouth pain   no oral bleeding   no mouth lesions  Throat:    no throat pain   no dysphagia  Neck:    no neck pain   no neck stiffness  Cardio:    Functional 4 Mets. Patient denies SOB walking up 2 flights of stairs   Walking dog, yard work, cooking, cleaning, grocery shopping, working at outpatient [employer] in [place]    no chest pain   no palpitations   no peripheral edema   no dyspnea   no MONTOYA  Respiratory:    no cough   no wheezing   no hemoptysis   no shortness of breath  Endocrine:    no cold intolerance   no heat intolerance  GI:    no abdominal distention   no abdominal pain   no constipation   diarrhea (dulcolax am/pm)   no nausea   no vomiting   no blood in stool  :    no difficulty urinating   no dysuria   no oliguria  Musculoskeletal:    no arthralgias   no myalgias   no decreased ROM  Hematologic:    does not bruise/bleed easily   no excessive bleeding   no history of blood transfusion   no blood clots  Skin:   no skin changes   no sores/wound   no rash      Physical Exam  Constitutional:       General: He is not in acute distress.     Appearance: Normal appearance. He is obese. He is not ill-appearing, toxic-appearing or diaphoretic.   HENT:      Head: Normocephalic and atraumatic.      Nose: Nose normal. No rhinorrhea.      Mouth/Throat:      Pharynx: No oropharyngeal  "exudate.   Eyes:      Extraocular Movements: Extraocular movements intact.      Conjunctiva/sclera: Conjunctivae normal.   Cardiovascular:      Rate and Rhythm: Normal rate and regular rhythm.      Heart sounds: No murmur heard.     No friction rub. No gallop.      Comments: Functional 4 Mets. Patient denies SOB walking up 2 flights of stairs     Pulmonary:      Effort: Pulmonary effort is normal. No respiratory distress.      Breath sounds: Normal breath sounds. No stridor. No wheezing or rhonchi.   Abdominal:      General: Bowel sounds are normal. There is no distension.      Palpations: Abdomen is soft. There is no mass.      Tenderness: There is no abdominal tenderness. There is no guarding or rebound.      Hernia: No hernia is present.   Musculoskeletal:         General: No swelling, tenderness, deformity or signs of injury. Normal range of motion.      Cervical back: Normal range of motion and neck supple. No rigidity or tenderness.   Skin:     General: Skin is warm and dry.      Coloration: Skin is not jaundiced or pale.      Findings: No bruising, erythema, lesion or rash.   Neurological:      General: No focal deficit present.      Mental Status: He is alert and oriented to person, place, and time.      Cranial Nerves: No cranial nerve deficit.      Sensory: No sensory deficit.      Motor: No weakness.      Coordination: Coordination normal.   Psychiatric:         Mood and Affect: Mood normal.         Behavior: Behavior normal.          PAT AIRWAY:   Airway:     Mallampati::  II    Neck ROM::  Full   No broken teeth, no dentures and no missing teeth          Visit Vitals  /74   Pulse 92   Temp 37 °C (98.6 °F)   Resp 18   Ht 1.845 m (6' 0.64\")   Wt 119 kg (261 lb 7.5 oz)   SpO2 95%   BMI 34.84 kg/m²   Smoking Status Never   BSA 2.47 m²          DASI Risk Score    No data to display       Caprini DVT Assessment    No data to display       Modified Frailty Index    No data to display       CHADS2 Stroke " Risk  Current as of 14 minutes ago        N/A 3 - 100%: High Risk   2 - 3%: Medium Risk   0 - 2%: Low Risk     Last Change: N/A          This score determines the patient's risk of having a stroke if the patient has atrial fibrillation.        This score is not applicable to this patient. Components are not calculated.          Revised Cardiac Risk Index    No data to display       Apfel Simplified Score    No data to display       Risk Analysis Index Results This Encounter    No data found in the last 1 encounters.       LABS:  Lab Results   Component Value Date    WBC 12.8 (H) 01/25/2024    HGB 14.3 01/25/2024    HCT 45.3 01/25/2024    MCV 85 01/25/2024     (H) 01/25/2024      Lab Results   Component Value Date    GLUCOSE 127 (H) 01/25/2024    CALCIUM 9.6 01/25/2024     01/25/2024    K 4.2 01/25/2024    CO2 27 01/25/2024     01/25/2024    BUN 18 01/25/2024    CREATININE 1.23 01/25/2024      EKG 1/25/24  NSR  Normal EKG  Vent rate = 88 bpm    ECHO 9/12/22  CONCLUSIONS:   1. Left ventricular systolic function is normal with a 60-65% estimated ejection fraction.   2. Spectral Doppler shows an impaired relaxation pattern of left ventricular diastolic filling.   3. There is trace-mild mitral and tricuspid regurgitation.    Assessment and Plan:       Patient is a -year-old female scheduled for a Laparoscopic Sigmoid Colon Resection with Dr. Walton on 2/7/24.    Patient has no active cardiac symptoms.   Patient denies any chest pain, tightness, heaviness, pressure, radiating pain, palpitations, irregular heartbeats, lightheadedness, cough, congestion, shortness of breath, MONTOYA, PND, near syncope, weight loss or gain.     RCRI  1 (type of surgery) , 6 % Risk of MACE    Cardiac:  HTN - pt not currently on any antiHTN meds; BP is 135/74 today  Encouraged lifestyle modifications, low-sodium diet, and increase activity as tolerated.  Monitor BP and follow up with managing physician for readings sustaining  >140/90.    HLD - cont statin on dos     Syncope - single episode 9/2022 will squatting and lifting patient simultaneously; ECHO 9/2022 wnl and zio patch wnl, no recurrence. METS >4 today. Pt denies any cardiac symptoms.  No need for cardiac workup at this time.      Pulmonary:  Asthma - cont inhalers as prescribed, including dos if needed    Suspect FOSTER - FOSTER-Patient asked to follow up with PCP for suspected FOSTER. Recommend prioritizing  nonopioid analgesic techniques (regional and local anesthesia, nonsteroidal medications, etc) before the administration of opioids and close monitoring for hypoventilation after surgery due to suspected FOSTER. If intravenous narcotics are needed beyond the immediate bela-operative period, the patient may benefit from continuous pulse oximetry to monitor for hypoxic events till baseline Sp02 is normal on room air and  a respiratory therapy evaluation.     Endocrine:  DMII - hold glimepiride dos  HgbA1c 10/12/23 = 7.1%    Hematology:  FVL - on daily ASA 81mg ; has been treated with Lovenox (post op only) in the past. No personal history of blood clot.     Leukocytosis  7/24/23 WBC 13.4; Neutro = 10.58  1/25/24 WBC 12.8; Neutro = 9.1    Thrombocytosis  7/24/23   1/25/24     Patient instructed to ambulate as soon as possible postoperatively to decrease thromboembolic risk.   Initiate mechanical DVT prophylaxis as soon as possible and initiate chemical prophylaxis when deemed safe from a bleeding standpoint post surgery.     LABS: CBC, BMP, T&S, MRSA and EKG ordered. Lab results reviewed and show thrombocytosis and leukocytosis which are stable. No additional testing required.     Followup: MRSA pending    STOP BANG: male, HTN, obese, >4 = 4    Caprini: 4    Risk assessment complete.  Patient is scheduled for a intermediate surgical risk procedure.        Preoperative medication instructions were provided and reviewed with the patient.  Any additional testing or evaluation  was explained to the patient.  Nothing by mouth instructions were discussed and patient's questions were answered prior to conclusion to this encounter.  Patient verbalized understanding of preoperative instructions given in preadmission testing; discharge instructions available in EMR.    This note was dictated by a speech recognition.  Minor errors may have been detected in a speech recognition.

## 2024-01-26 LAB
ATRIAL RATE: 88 BPM
P AXIS: 48 DEGREES
P OFFSET: 192 MS
P ONSET: 138 MS
PR INTERVAL: 152 MS
Q ONSET: 214 MS
QRS COUNT: 14 BEATS
QRS DURATION: 88 MS
QT INTERVAL: 366 MS
QTC CALCULATION(BAZETT): 442 MS
QTC FREDERICIA: 416 MS
R AXIS: 0 DEGREES
T AXIS: 68 DEGREES
T OFFSET: 397 MS
VENTRICULAR RATE: 88 BPM

## 2024-01-27 LAB — STAPHYLOCOCCUS SPEC CULT: NORMAL

## 2024-01-29 RX ORDER — TRAZODONE HYDROCHLORIDE 50 MG/1
50 TABLET ORAL NIGHTLY
Qty: 90 TABLET | Refills: 1 | Status: SHIPPED | OUTPATIENT
Start: 2024-01-29

## 2024-01-30 DIAGNOSIS — K57.92 DIVERTICULITIS: Primary | ICD-10-CM

## 2024-01-30 RX ORDER — HEPARIN SODIUM 5000 [USP'U]/ML
5000 INJECTION, SOLUTION INTRAVENOUS; SUBCUTANEOUS ONCE
Status: CANCELLED | OUTPATIENT
Start: 2024-01-30 | End: 2024-01-30

## 2024-01-30 RX ORDER — METRONIDAZOLE 500 MG/100ML
500 INJECTION, SOLUTION INTRAVENOUS ONCE
Status: CANCELLED | OUTPATIENT
Start: 2024-01-30 | End: 2024-01-30

## 2024-02-06 ENCOUNTER — ANESTHESIA EVENT (OUTPATIENT)
Dept: OPERATING ROOM | Facility: HOSPITAL | Age: 59
DRG: 330 | End: 2024-02-06
Payer: COMMERCIAL

## 2024-02-06 PROBLEM — D68.51 FACTOR V LEIDEN (MULTI): Status: ACTIVE | Noted: 2024-02-06

## 2024-02-06 PROBLEM — E66.9 OBESITY: Status: ACTIVE | Noted: 2024-02-06

## 2024-02-06 NOTE — ANESTHESIA PREPROCEDURE EVALUATION
Patient: Shaheen Ambriz    Procedure Information       Date/Time: 02/07/24 0730    Procedure: Robot Assisted Laparoscopic Sigmoid Colon Resection    Location: [facility] / [facility]    Surgeons: Raj Walton MD            Relevant Problems   Cardiovascular   (+) Mixed hyperlipidemia      Endocrine   (+) Obesity   (+) Type 2 diabetes mellitus without complication, without long-term current use of insulin (CMS/HCC)      GI   (+) Gastroesophageal reflux disease without esophagitis      /Renal   (+) Nephrolithiasis      Neuro/Psych   (+) Anxiety   (+) Depression      Pulmonary   (+) Asthma   (+) Dyspnea on exertion      Hematology   (+) Factor V Leiden (CMS/HCC)      Musculoskeletal   (+) Degeneration of lumbar or lumbosacral intervertebral disc   (+) Displacement of lumbar intervertebral disc without myelopathy   (+) Lumbosacral spondylosis without myelopathy      Infectious Disease   (+) Tinea corporis       Clinical information reviewed:   Tobacco  Allergies  Meds   Med Hx  Surg Hx   Fam Hx  Soc Hx        NPO Detail:  No data recorded     Physical Exam    Airway  Mallampati: II     Cardiovascular   Rhythm: regular  Rate: normal     Dental    Pulmonary   Breath sounds clear to auscultation     Abdominal            Anesthesia Plan    History of general anesthesia?: yes  History of complications of general anesthesia?: no    ASA 3     general     intravenous induction   Anesthetic plan and risks discussed with patient.    Plan discussed with CRNA and CAA.

## 2024-02-07 ENCOUNTER — ANESTHESIA (OUTPATIENT)
Dept: OPERATING ROOM | Facility: HOSPITAL | Age: 59
DRG: 330 | End: 2024-02-07
Payer: COMMERCIAL

## 2024-02-07 ENCOUNTER — HOSPITAL ENCOUNTER (INPATIENT)
Facility: HOSPITAL | Age: 59
LOS: 3 days | Discharge: HOME | DRG: 330 | End: 2024-02-10
Attending: SURGERY | Admitting: SURGERY
Payer: COMMERCIAL

## 2024-02-07 DIAGNOSIS — J45.909 UNSPECIFIED ASTHMA, UNCOMPLICATED (HHS-HCC): ICD-10-CM

## 2024-02-07 DIAGNOSIS — K57.92 DIVERTICULITIS: Primary | ICD-10-CM

## 2024-02-07 DIAGNOSIS — Z90.49 S/P COLON RESECTION: ICD-10-CM

## 2024-02-07 LAB
ABO GROUP (TYPE) IN BLOOD: NORMAL
GLUCOSE BLD MANUAL STRIP-MCNC: 106 MG/DL (ref 74–99)
GLUCOSE BLD MANUAL STRIP-MCNC: 108 MG/DL (ref 74–99)
GLUCOSE BLD MANUAL STRIP-MCNC: 174 MG/DL (ref 74–99)
GLUCOSE BLD MANUAL STRIP-MCNC: 180 MG/DL (ref 74–99)
RH FACTOR (ANTIGEN D): NORMAL

## 2024-02-07 PROCEDURE — 99231 SBSQ HOSP IP/OBS SF/LOW 25: CPT

## 2024-02-07 PROCEDURE — 88304 TISSUE EXAM BY PATHOLOGIST: CPT | Mod: TC,SUR,AHULAB | Performed by: SURGERY

## 2024-02-07 PROCEDURE — 2500000002 HC RX 250 W HCPCS SELF ADMINISTERED DRUGS (ALT 637 FOR MEDICARE OP, ALT 636 FOR OP/ED)

## 2024-02-07 PROCEDURE — 7100000002 HC RECOVERY ROOM TIME - EACH INCREMENTAL 1 MINUTE: Performed by: SURGERY

## 2024-02-07 PROCEDURE — 2500000004 HC RX 250 GENERAL PHARMACY W/ HCPCS (ALT 636 FOR OP/ED): Performed by: SURGERY

## 2024-02-07 PROCEDURE — 2720000007 HC OR 272 NO HCPCS: Performed by: SURGERY

## 2024-02-07 PROCEDURE — 88304 TISSUE EXAM BY PATHOLOGIST: CPT | Performed by: STUDENT IN AN ORGANIZED HEALTH CARE EDUCATION/TRAINING PROGRAM

## 2024-02-07 PROCEDURE — 3600000004 HC OR TIME - INITIAL BASE CHARGE - PROCEDURE LEVEL FOUR: Performed by: SURGERY

## 2024-02-07 PROCEDURE — 2500000002 HC RX 250 W HCPCS SELF ADMINISTERED DRUGS (ALT 637 FOR MEDICARE OP, ALT 636 FOR OP/ED): Performed by: NURSE ANESTHETIST, CERTIFIED REGISTERED

## 2024-02-07 PROCEDURE — A44204 PR LAP,SURG,COLECTOMY, PARTIAL, W/ANAST: Performed by: NURSE ANESTHETIST, CERTIFIED REGISTERED

## 2024-02-07 PROCEDURE — C9113 INJ PANTOPRAZOLE SODIUM, VIA: HCPCS | Performed by: SURGERY

## 2024-02-07 PROCEDURE — 2780000003 HC OR 278 NO HCPCS: Performed by: SURGERY

## 2024-02-07 PROCEDURE — 3700000001 HC GENERAL ANESTHESIA TIME - INITIAL BASE CHARGE: Performed by: SURGERY

## 2024-02-07 PROCEDURE — 0DTN4ZZ RESECTION OF SIGMOID COLON, PERCUTANEOUS ENDOSCOPIC APPROACH: ICD-10-PCS | Performed by: SURGERY

## 2024-02-07 PROCEDURE — 2500000005 HC RX 250 GENERAL PHARMACY W/O HCPCS: Performed by: NURSE ANESTHETIST, CERTIFIED REGISTERED

## 2024-02-07 PROCEDURE — 3600000009 HC OR TIME - EACH INCREMENTAL 1 MINUTE - PROCEDURE LEVEL FOUR: Performed by: SURGERY

## 2024-02-07 PROCEDURE — 2500000004 HC RX 250 GENERAL PHARMACY W/ HCPCS (ALT 636 FOR OP/ED): Performed by: ANESTHESIOLOGY

## 2024-02-07 PROCEDURE — 1100000001 HC PRIVATE ROOM DAILY

## 2024-02-07 PROCEDURE — 7100000001 HC RECOVERY ROOM TIME - INITIAL BASE CHARGE: Performed by: SURGERY

## 2024-02-07 PROCEDURE — A44204 PR LAP,SURG,COLECTOMY, PARTIAL, W/ANAST: Performed by: ANESTHESIOLOGY

## 2024-02-07 PROCEDURE — 44204 LAPARO PARTIAL COLECTOMY: CPT | Performed by: SURGERY

## 2024-02-07 PROCEDURE — 82947 ASSAY GLUCOSE BLOOD QUANT: CPT

## 2024-02-07 PROCEDURE — 2500000005 HC RX 250 GENERAL PHARMACY W/O HCPCS: Performed by: SURGERY

## 2024-02-07 PROCEDURE — 2500000004 HC RX 250 GENERAL PHARMACY W/ HCPCS (ALT 636 FOR OP/ED): Performed by: NURSE ANESTHETIST, CERTIFIED REGISTERED

## 2024-02-07 PROCEDURE — 94640 AIRWAY INHALATION TREATMENT: CPT

## 2024-02-07 PROCEDURE — 2500000001 HC RX 250 WO HCPCS SELF ADMINISTERED DRUGS (ALT 637 FOR MEDICARE OP): Performed by: SURGERY

## 2024-02-07 PROCEDURE — 8E0W4CZ ROBOTIC ASSISTED PROCEDURE OF TRUNK REGION, PERCUTANEOUS ENDOSCOPIC APPROACH: ICD-10-PCS | Performed by: SURGERY

## 2024-02-07 PROCEDURE — 36415 COLL VENOUS BLD VENIPUNCTURE: CPT | Performed by: SURGERY

## 2024-02-07 PROCEDURE — 3700000002 HC GENERAL ANESTHESIA TIME - EACH INCREMENTAL 1 MINUTE: Performed by: SURGERY

## 2024-02-07 RX ORDER — ALBUTEROL SULFATE 90 UG/1
AEROSOL, METERED RESPIRATORY (INHALATION) AS NEEDED
Status: DISCONTINUED | OUTPATIENT
Start: 2024-02-07 | End: 2024-02-07

## 2024-02-07 RX ORDER — FORMOTEROL FUMARATE DIHYDRATE 20 UG/2ML
20 SOLUTION RESPIRATORY (INHALATION)
Status: DISCONTINUED | OUTPATIENT
Start: 2024-02-07 | End: 2024-02-10 | Stop reason: HOSPADM

## 2024-02-07 RX ORDER — CEFAZOLIN 1 G/1
INJECTION, POWDER, FOR SOLUTION INTRAVENOUS AS NEEDED
Status: DISCONTINUED | OUTPATIENT
Start: 2024-02-07 | End: 2024-02-07

## 2024-02-07 RX ORDER — SODIUM CHLORIDE, SODIUM LACTATE, POTASSIUM CHLORIDE, CALCIUM CHLORIDE 600; 310; 30; 20 MG/100ML; MG/100ML; MG/100ML; MG/100ML
100 INJECTION, SOLUTION INTRAVENOUS CONTINUOUS
Status: DISCONTINUED | OUTPATIENT
Start: 2024-02-07 | End: 2024-02-07 | Stop reason: HOSPADM

## 2024-02-07 RX ORDER — TALC
5 POWDER (GRAM) TOPICAL NIGHTLY PRN
Status: DISCONTINUED | OUTPATIENT
Start: 2024-02-07 | End: 2024-02-10 | Stop reason: HOSPADM

## 2024-02-07 RX ORDER — BUDESONIDE 0.5 MG/2ML
0.5 INHALANT ORAL
Status: DISCONTINUED | OUTPATIENT
Start: 2024-02-08 | End: 2024-02-07

## 2024-02-07 RX ORDER — HEPARIN SODIUM 5000 [USP'U]/ML
5000 INJECTION, SOLUTION INTRAVENOUS; SUBCUTANEOUS ONCE
Status: COMPLETED | OUTPATIENT
Start: 2024-02-07 | End: 2024-02-07

## 2024-02-07 RX ORDER — PROMETHAZINE HYDROCHLORIDE 25 MG/1
25 TABLET ORAL EVERY 6 HOURS PRN
Status: DISCONTINUED | OUTPATIENT
Start: 2024-02-07 | End: 2024-02-10 | Stop reason: HOSPADM

## 2024-02-07 RX ORDER — CEFAZOLIN SODIUM 2 G/100ML
2 INJECTION, SOLUTION INTRAVENOUS ONCE
Status: DISCONTINUED | OUTPATIENT
Start: 2024-02-07 | End: 2024-02-07 | Stop reason: HOSPADM

## 2024-02-07 RX ORDER — MONTELUKAST SODIUM 10 MG/1
10 TABLET ORAL DAILY
Status: DISCONTINUED | OUTPATIENT
Start: 2024-02-07 | End: 2024-02-08

## 2024-02-07 RX ORDER — PROMETHAZINE HYDROCHLORIDE 25 MG/1
25 SUPPOSITORY RECTAL EVERY 12 HOURS PRN
Status: DISCONTINUED | OUTPATIENT
Start: 2024-02-07 | End: 2024-02-10 | Stop reason: HOSPADM

## 2024-02-07 RX ORDER — LIDOCAINE HYDROCHLORIDE 10 MG/ML
0.1 INJECTION, SOLUTION EPIDURAL; INFILTRATION; INTRACAUDAL; PERINEURAL ONCE
Status: DISCONTINUED | OUTPATIENT
Start: 2024-02-07 | End: 2024-02-07 | Stop reason: HOSPADM

## 2024-02-07 RX ORDER — FORMOTEROL FUMARATE DIHYDRATE 20 UG/2ML
20 SOLUTION RESPIRATORY (INHALATION)
Status: DISCONTINUED | OUTPATIENT
Start: 2024-02-08 | End: 2024-02-07

## 2024-02-07 RX ORDER — FENTANYL CITRATE 50 UG/ML
INJECTION, SOLUTION INTRAMUSCULAR; INTRAVENOUS AS NEEDED
Status: DISCONTINUED | OUTPATIENT
Start: 2024-02-07 | End: 2024-02-07

## 2024-02-07 RX ORDER — ACETAMINOPHEN 325 MG/1
650 TABLET ORAL EVERY 4 HOURS PRN
Status: DISCONTINUED | OUTPATIENT
Start: 2024-02-07 | End: 2024-02-07 | Stop reason: HOSPADM

## 2024-02-07 RX ORDER — OXYCODONE HYDROCHLORIDE 5 MG/1
5 TABLET ORAL EVERY 4 HOURS PRN
Status: DISCONTINUED | OUTPATIENT
Start: 2024-02-07 | End: 2024-02-07 | Stop reason: HOSPADM

## 2024-02-07 RX ORDER — HYDRALAZINE HYDROCHLORIDE 20 MG/ML
INJECTION INTRAMUSCULAR; INTRAVENOUS AS NEEDED
Status: DISCONTINUED | OUTPATIENT
Start: 2024-02-07 | End: 2024-02-07

## 2024-02-07 RX ORDER — LABETALOL HYDROCHLORIDE 5 MG/ML
INJECTION, SOLUTION INTRAVENOUS AS NEEDED
Status: DISCONTINUED | OUTPATIENT
Start: 2024-02-07 | End: 2024-02-07

## 2024-02-07 RX ORDER — ENOXAPARIN SODIUM 100 MG/ML
40 INJECTION SUBCUTANEOUS EVERY 24 HOURS
Status: DISCONTINUED | OUTPATIENT
Start: 2024-02-07 | End: 2024-02-10 | Stop reason: HOSPADM

## 2024-02-07 RX ORDER — IPRATROPIUM BROMIDE 0.5 MG/2.5ML
500 SOLUTION RESPIRATORY (INHALATION) ONCE
Status: DISCONTINUED | OUTPATIENT
Start: 2024-02-07 | End: 2024-02-07 | Stop reason: HOSPADM

## 2024-02-07 RX ORDER — MIDAZOLAM HYDROCHLORIDE 1 MG/ML
INJECTION INTRAMUSCULAR; INTRAVENOUS AS NEEDED
Status: DISCONTINUED | OUTPATIENT
Start: 2024-02-07 | End: 2024-02-07

## 2024-02-07 RX ORDER — PROPOFOL 10 MG/ML
INJECTION, EMULSION INTRAVENOUS AS NEEDED
Status: DISCONTINUED | OUTPATIENT
Start: 2024-02-07 | End: 2024-02-07

## 2024-02-07 RX ORDER — ALBUTEROL SULFATE 0.83 MG/ML
2.5 SOLUTION RESPIRATORY (INHALATION) EVERY 6 HOURS PRN
Status: DISCONTINUED | OUTPATIENT
Start: 2024-02-07 | End: 2024-02-10 | Stop reason: HOSPADM

## 2024-02-07 RX ORDER — ROCURONIUM BROMIDE 10 MG/ML
INJECTION, SOLUTION INTRAVENOUS AS NEEDED
Status: DISCONTINUED | OUTPATIENT
Start: 2024-02-07 | End: 2024-02-07

## 2024-02-07 RX ORDER — LIDOCAINE HYDROCHLORIDE 20 MG/ML
INJECTION, SOLUTION INFILTRATION; PERINEURAL AS NEEDED
Status: DISCONTINUED | OUTPATIENT
Start: 2024-02-07 | End: 2024-02-07

## 2024-02-07 RX ORDER — OXYCODONE HYDROCHLORIDE 5 MG/1
10 TABLET ORAL EVERY 4 HOURS PRN
Status: DISCONTINUED | OUTPATIENT
Start: 2024-02-07 | End: 2024-02-10 | Stop reason: HOSPADM

## 2024-02-07 RX ORDER — GABAPENTIN 300 MG/1
300 CAPSULE ORAL 3 TIMES DAILY
Status: DISCONTINUED | OUTPATIENT
Start: 2024-02-07 | End: 2024-02-10 | Stop reason: HOSPADM

## 2024-02-07 RX ORDER — BUDESONIDE 0.5 MG/2ML
0.5 INHALANT ORAL
Status: DISCONTINUED | OUTPATIENT
Start: 2024-02-07 | End: 2024-02-10 | Stop reason: HOSPADM

## 2024-02-07 RX ORDER — OXYCODONE HYDROCHLORIDE 5 MG/1
5 TABLET ORAL EVERY 4 HOURS PRN
Status: DISCONTINUED | OUTPATIENT
Start: 2024-02-07 | End: 2024-02-10 | Stop reason: HOSPADM

## 2024-02-07 RX ORDER — DEXAMETHASONE SODIUM PHOSPHATE 4 MG/ML
INJECTION, SOLUTION INTRA-ARTICULAR; INTRALESIONAL; INTRAMUSCULAR; INTRAVENOUS; SOFT TISSUE AS NEEDED
Status: DISCONTINUED | OUTPATIENT
Start: 2024-02-07 | End: 2024-02-07

## 2024-02-07 RX ORDER — MONTELUKAST SODIUM 10 MG/1
10 TABLET ORAL EVERY EVENING
Qty: 30 TABLET | Refills: 1 | Status: SHIPPED | OUTPATIENT
Start: 2024-02-07 | End: 2024-03-04

## 2024-02-07 RX ORDER — FLUTICASONE FUROATE AND VILANTEROL 100; 25 UG/1; UG/1
1 POWDER RESPIRATORY (INHALATION)
Status: DISCONTINUED | OUTPATIENT
Start: 2024-02-07 | End: 2024-02-07

## 2024-02-07 RX ORDER — ACETAMINOPHEN 325 MG/1
650 TABLET ORAL EVERY 4 HOURS PRN
Status: DISCONTINUED | OUTPATIENT
Start: 2024-02-07 | End: 2024-02-10 | Stop reason: HOSPADM

## 2024-02-07 RX ORDER — ONDANSETRON HYDROCHLORIDE 2 MG/ML
INJECTION, SOLUTION INTRAVENOUS AS NEEDED
Status: DISCONTINUED | OUTPATIENT
Start: 2024-02-07 | End: 2024-02-07

## 2024-02-07 RX ORDER — ONDANSETRON HYDROCHLORIDE 2 MG/ML
4 INJECTION, SOLUTION INTRAVENOUS ONCE AS NEEDED
Status: COMPLETED | OUTPATIENT
Start: 2024-02-07 | End: 2024-02-07

## 2024-02-07 RX ORDER — SODIUM CHLORIDE 9 MG/ML
40 INJECTION, SOLUTION INTRAVENOUS CONTINUOUS
Status: DISCONTINUED | OUTPATIENT
Start: 2024-02-07 | End: 2024-02-10 | Stop reason: HOSPADM

## 2024-02-07 RX ORDER — PANTOPRAZOLE SODIUM 40 MG/10ML
20 INJECTION, POWDER, LYOPHILIZED, FOR SOLUTION INTRAVENOUS
Status: DISCONTINUED | OUTPATIENT
Start: 2024-02-07 | End: 2024-02-10 | Stop reason: HOSPADM

## 2024-02-07 RX ORDER — NALOXONE HYDROCHLORIDE 0.4 MG/ML
0.2 INJECTION, SOLUTION INTRAMUSCULAR; INTRAVENOUS; SUBCUTANEOUS EVERY 5 MIN PRN
Status: DISCONTINUED | OUTPATIENT
Start: 2024-02-07 | End: 2024-02-10 | Stop reason: HOSPADM

## 2024-02-07 RX ORDER — FLUOXETINE HYDROCHLORIDE 20 MG/1
40 CAPSULE ORAL DAILY
Status: DISCONTINUED | OUTPATIENT
Start: 2024-02-07 | End: 2024-02-10 | Stop reason: HOSPADM

## 2024-02-07 RX ORDER — ALBUTEROL SULFATE 0.83 MG/ML
2.5 SOLUTION RESPIRATORY (INHALATION) ONCE AS NEEDED
Status: DISCONTINUED | OUTPATIENT
Start: 2024-02-07 | End: 2024-02-07 | Stop reason: HOSPADM

## 2024-02-07 RX ORDER — METRONIDAZOLE 500 MG/100ML
500 INJECTION, SOLUTION INTRAVENOUS ONCE
Status: COMPLETED | OUTPATIENT
Start: 2024-02-07 | End: 2024-02-07

## 2024-02-07 RX ADMIN — LABETALOL HYDROCHLORIDE 20 MG: 5 INJECTION, SOLUTION INTRAVENOUS at 10:09

## 2024-02-07 RX ADMIN — LABETALOL HYDROCHLORIDE 20 MG: 5 INJECTION, SOLUTION INTRAVENOUS at 08:33

## 2024-02-07 RX ADMIN — ONDANSETRON 4 MG: 2 INJECTION, SOLUTION INTRAMUSCULAR; INTRAVENOUS at 07:40

## 2024-02-07 RX ADMIN — PROPOFOL 200 MG: 10 INJECTION, EMULSION INTRAVENOUS at 07:40

## 2024-02-07 RX ADMIN — FENTANYL CITRATE 100 MCG: 50 INJECTION, SOLUTION INTRAMUSCULAR; INTRAVENOUS at 07:36

## 2024-02-07 RX ADMIN — METRONIDAZOLE 500 MG: 500 SOLUTION INTRAVENOUS at 07:40

## 2024-02-07 RX ADMIN — FORMOTEROL FUMARATE DIHYDRATE 20 MCG: 20 SOLUTION RESPIRATORY (INHALATION) at 20:10

## 2024-02-07 RX ADMIN — HYDRALAZINE HYDROCHLORIDE 4 MG: 20 INJECTION INTRAMUSCULAR; INTRAVENOUS at 09:26

## 2024-02-07 RX ADMIN — SUGAMMADEX 200 MG: 100 INJECTION, SOLUTION INTRAVENOUS at 11:43

## 2024-02-07 RX ADMIN — HEPARIN SODIUM 5000 UNITS: 5000 INJECTION INTRAVENOUS; SUBCUTANEOUS at 07:01

## 2024-02-07 RX ADMIN — ALBUTEROL SULFATE 4 PUFF: 90 AEROSOL, METERED RESPIRATORY (INHALATION) at 11:22

## 2024-02-07 RX ADMIN — SODIUM CHLORIDE, SODIUM LACTATE, POTASSIUM CHLORIDE, AND CALCIUM CHLORIDE: 600; 310; 30; 20 INJECTION, SOLUTION INTRAVENOUS at 07:40

## 2024-02-07 RX ADMIN — GABAPENTIN 300 MG: 300 CAPSULE ORAL at 15:17

## 2024-02-07 RX ADMIN — FENTANYL CITRATE 100 MCG: 50 INJECTION, SOLUTION INTRAMUSCULAR; INTRAVENOUS at 08:00

## 2024-02-07 RX ADMIN — FENTANYL CITRATE 100 MCG: 50 INJECTION, SOLUTION INTRAMUSCULAR; INTRAVENOUS at 08:11

## 2024-02-07 RX ADMIN — HYDRALAZINE HYDROCHLORIDE 4 MG: 20 INJECTION INTRAMUSCULAR; INTRAVENOUS at 08:46

## 2024-02-07 RX ADMIN — ROCURONIUM BROMIDE 20 MG: 10 INJECTION, SOLUTION INTRAVENOUS at 10:40

## 2024-02-07 RX ADMIN — ALBUTEROL SULFATE 4 PUFF: 90 AEROSOL, METERED RESPIRATORY (INHALATION) at 08:39

## 2024-02-07 RX ADMIN — SODIUM CHLORIDE 40 ML/HR: 9 INJECTION, SOLUTION INTRAVENOUS at 15:30

## 2024-02-07 RX ADMIN — OXYCODONE HYDROCHLORIDE 10 MG: 5 TABLET ORAL at 21:10

## 2024-02-07 RX ADMIN — BUDESONIDE INHALATION 0.5 MG: 0.5 SUSPENSION RESPIRATORY (INHALATION) at 20:10

## 2024-02-07 RX ADMIN — ROCURONIUM BROMIDE 20 MG: 10 INJECTION, SOLUTION INTRAVENOUS at 09:24

## 2024-02-07 RX ADMIN — FENTANYL CITRATE 25 MCG: 50 INJECTION, SOLUTION INTRAMUSCULAR; INTRAVENOUS at 11:44

## 2024-02-07 RX ADMIN — FENTANYL CITRATE 75 MCG: 50 INJECTION, SOLUTION INTRAMUSCULAR; INTRAVENOUS at 11:47

## 2024-02-07 RX ADMIN — PANTOPRAZOLE SODIUM 20 MG: 40 INJECTION, POWDER, FOR SOLUTION INTRAVENOUS at 17:52

## 2024-02-07 RX ADMIN — ENOXAPARIN SODIUM 40 MG: 40 INJECTION SUBCUTANEOUS at 15:18

## 2024-02-07 RX ADMIN — LIDOCAINE HYDROCHLORIDE 50 MG: 20 INJECTION, SOLUTION INFILTRATION; PERINEURAL at 07:40

## 2024-02-07 RX ADMIN — ROCURONIUM BROMIDE 20 MG: 10 INJECTION, SOLUTION INTRAVENOUS at 10:27

## 2024-02-07 RX ADMIN — ROCURONIUM BROMIDE 40 MG: 10 INJECTION, SOLUTION INTRAVENOUS at 08:11

## 2024-02-07 RX ADMIN — ONDANSETRON 4 MG: 2 INJECTION INTRAMUSCULAR; INTRAVENOUS at 12:43

## 2024-02-07 RX ADMIN — HYDRALAZINE HYDROCHLORIDE 4 MG: 20 INJECTION INTRAMUSCULAR; INTRAVENOUS at 09:11

## 2024-02-07 RX ADMIN — GABAPENTIN 300 MG: 300 CAPSULE ORAL at 21:11

## 2024-02-07 RX ADMIN — OXYCODONE HYDROCHLORIDE 5 MG: 5 TABLET ORAL at 15:17

## 2024-02-07 RX ADMIN — DEXAMETHASONE SODIUM PHOSPHATE 4 MG: 4 INJECTION, SOLUTION INTRA-ARTICULAR; INTRALESIONAL; INTRAMUSCULAR; INTRAVENOUS; SOFT TISSUE at 07:40

## 2024-02-07 RX ADMIN — HYDRALAZINE HYDROCHLORIDE 4 MG: 20 INJECTION INTRAMUSCULAR; INTRAVENOUS at 09:49

## 2024-02-07 RX ADMIN — SODIUM CHLORIDE, SODIUM LACTATE, POTASSIUM CHLORIDE, AND CALCIUM CHLORIDE: 600; 310; 30; 20 INJECTION, SOLUTION INTRAVENOUS at 11:39

## 2024-02-07 RX ADMIN — CEFAZOLIN 3 G: 330 INJECTION, POWDER, FOR SOLUTION INTRAMUSCULAR; INTRAVENOUS at 07:40

## 2024-02-07 RX ADMIN — ROCURONIUM BROMIDE 60 MG: 10 INJECTION, SOLUTION INTRAVENOUS at 07:40

## 2024-02-07 RX ADMIN — MIDAZOLAM HYDROCHLORIDE 2 MG: 1 INJECTION, SOLUTION INTRAMUSCULAR; INTRAVENOUS at 07:36

## 2024-02-07 RX ADMIN — HYDRALAZINE HYDROCHLORIDE 4 MG: 20 INJECTION INTRAMUSCULAR; INTRAVENOUS at 09:53

## 2024-02-07 RX ADMIN — ROCURONIUM BROMIDE 30 MG: 10 INJECTION, SOLUTION INTRAVENOUS at 08:38

## 2024-02-07 RX ADMIN — CEFAZOLIN 3 G: 330 INJECTION, POWDER, FOR SOLUTION INTRAMUSCULAR; INTRAVENOUS at 11:35

## 2024-02-07 RX ADMIN — LABETALOL HYDROCHLORIDE 10 MG: 5 INJECTION, SOLUTION INTRAVENOUS at 08:21

## 2024-02-07 ASSESSMENT — PAIN - FUNCTIONAL ASSESSMENT
PAIN_FUNCTIONAL_ASSESSMENT: 0-10
PAIN_FUNCTIONAL_ASSESSMENT: 0-10
PAIN_FUNCTIONAL_ASSESSMENT: UNABLE TO SELF-REPORT
PAIN_FUNCTIONAL_ASSESSMENT: 0-10

## 2024-02-07 ASSESSMENT — COGNITIVE AND FUNCTIONAL STATUS - GENERAL
DRESSING REGULAR UPPER BODY CLOTHING: A LITTLE
STANDING UP FROM CHAIR USING ARMS: A LITTLE
MOVING FROM LYING ON BACK TO SITTING ON SIDE OF FLAT BED WITH BEDRAILS: A LITTLE
HELP NEEDED FOR BATHING: A LITTLE
MOVING TO AND FROM BED TO CHAIR: A LITTLE
MOBILITY SCORE: 16
TURNING FROM BACK TO SIDE WHILE IN FLAT BAD: A LITTLE
DRESSING REGULAR LOWER BODY CLOTHING: A LITTLE
WALKING IN HOSPITAL ROOM: A LITTLE
CLIMB 3 TO 5 STEPS WITH RAILING: TOTAL
TOILETING: A LITTLE
DAILY ACTIVITIY SCORE: 19
PERSONAL GROOMING: A LITTLE

## 2024-02-07 ASSESSMENT — PAIN SCALES - GENERAL
PAINLEVEL_OUTOF10: 4
PAINLEVEL_OUTOF10: 3
PAINLEVEL_OUTOF10: 4
PAINLEVEL_OUTOF10: 0 - NO PAIN
PAINLEVEL_OUTOF10: 4
PAINLEVEL_OUTOF10: 0 - NO PAIN
PAINLEVEL_OUTOF10: 4
PAINLEVEL_OUTOF10: 4
PAINLEVEL_OUTOF10: 7
PAINLEVEL_OUTOF10: 0 - NO PAIN

## 2024-02-07 ASSESSMENT — COLUMBIA-SUICIDE SEVERITY RATING SCALE - C-SSRS
1. IN THE PAST MONTH, HAVE YOU WISHED YOU WERE DEAD OR WISHED YOU COULD GO TO SLEEP AND NOT WAKE UP?: NO
2. HAVE YOU ACTUALLY HAD ANY THOUGHTS OF KILLING YOURSELF?: NO
6. HAVE YOU EVER DONE ANYTHING, STARTED TO DO ANYTHING, OR PREPARED TO DO ANYTHING TO END YOUR LIFE?: NO

## 2024-02-07 ASSESSMENT — PAIN DESCRIPTION - LOCATION: LOCATION: ABDOMEN

## 2024-02-07 NOTE — ANESTHESIA PROCEDURE NOTES
Airway  Date/Time: 2/7/2024 7:40 AM  Urgency: elective    Airway not difficult    Staffing  Performed: CRNA   Authorized by: Doug Mayers MD    Performed by: CONSTANTINO Cerrato-SELENE  Patient location during procedure: OR    Indications and Patient Condition  Indications for airway management: anesthesia  Spontaneous ventilation: present  Sedation level: deep  Preoxygenated: yes  Patient position: sniffing      Final Airway Details  Final airway type: endotracheal airway      Successful airway: ETT  Cuffed: yes   Successful intubation technique: direct laryngoscopy  Blade: Ghassan  Blade size: #4  ETT size (mm): 7.5  Cormack-Lehane Classification: grade IIb - view of arytenoids or posterior of glottis only  Number of attempts at approach: 1

## 2024-02-07 NOTE — POST-PROCEDURE NOTE
#[id number]  LUC GOODRICH    Procedure: Laparoscopic sigmoid colon resection     · PMH chronic diverticulitis flare ups     · Anesthesia type (i.e. general, regional, MAC): general     · Estimated blood loss (EBL) in OR: 10     · Orientation/mental status: X4    · IV site(s), and drips/fluids currently infusin g left wrist, 18g right hand  LR 100ML/HR     · PO status (last oral intake):     · O2 requirements: 94% on 3lpm nc     · Current pain level & last pain/nausea medication dose/time given:4/10 pain, ZOFRAN 4MG @1243     · Next antibiotic due @NONE     · Last tranexamic acid dose given @ NONE    · Last void/Gutierrez in place: 16 Nepalese gutierrez in place with orders to discharge the day of surgery     · Equipment sent with patient (i.e. Polar Cube, TENs unit, walker, crutches): SCD     · SCDs on (yes/no): bilateral lower leg     · Belongings sent with patient: CORINA bag     · Emergency contact (name, relationship, phone number): Lynn [phone]

## 2024-02-07 NOTE — OP NOTE
Robot Assisted Laparoscopic Sigmoid Colon Resection Operative Note     Date: 2024  OR Location: [facility]    Name: Shaheen Ambriz, : 1965, Age: 58 y.o., MRN: [id number], Sex: male    Diagnosis  Pre-op Diagnosis     * Diverticulitis [K57.92] Post-op Diagnosis     * Diverticulitis [K57.92]     Procedures  Robot Assisted Laparoscopic Sigmoid Colon Resection  71113 - ND LAPAROSCOPY COLECTOMY PARTIAL W/ANASTOMOSIS    Robot Assisted Laparoscopic Sigmoid Colon Resection   - ND ROBOTIC SURGICAL SYSTEM      Surgeons      * Raj Walton - Primary    Resident/Fellow/Other Assistant:  Surgeon(s) and Role:    Procedure Summary  Anesthesia: General  ASA: III  Anesthesia Staff: Anesthesiologist: Doug Mayers MD  CRNA: CONSTANTINO Cerrato-CRNA  C-AA: AZUCENA Valdez  Estimated Blood Loss: 20 mL  Intra-op Medications:   Administrations occurring from 0730 to 1130 on 24:   Medication Name Total Dose   BUPivacaine HCl (Marcaine) 0.5 % (5 mg/mL) 30 mL in sodium chloride 0.9% 30 mL syringe 10 mL   metroNIDAZOLE (Flagyl) 500 mg in NaCl (iso-os) 100 mL 500 mg              Anesthesia Record               Intraprocedure I/O Totals          Intake    LR bolus 1000.00 mL    Total Intake 1000 mL          Specimen:   ID Type Source Tests Collected by Time   1 : SIGMOID RESECTION Tissue COLON - SIGMOID RESECTION SURGICAL PATHOLOGY EXAM Raj Walton MD 2024 1044        Staff:   Circulator: Ophelia Wright RN; Kennedi Naidu RN  Relief Scrub: Eliza Moss; Desi Villalba RN  Scrub Person: Aaliyah Chavez; Ophelia Jackson         Drains and/or Catheters:   Urethral Catheter Straight-tip 16 Fr. (Active)       Tourniquet Times:         Implants:     Findings: Thickened sigmoid colon with small bowel adhesions consistent with chronic diverticulitis    Indications: Shaheen Ambriz is an 58 y.o. male who is having surgery for Diverticulitis [K57.92].  Chronic uncomplicated diverticulitis    The patient was seen  in the preoperative area. The risks, benefits, complications, treatment options, non-operative alternatives, expected recovery and outcomes were discussed with the patient. The possibilities of reaction to medication, pulmonary aspiration, injury to surrounding structures, bleeding, recurrent infection, the need for additional procedures, failure to diagnose a condition, and creating a complication requiring transfusion or operation were discussed with the patient. The patient concurred with the proposed plan, giving informed consent.  The site of surgery was properly noted/marked if necessary per policy. The patient has been actively warmed in preoperative area. Preoperative antibiotics have been ordered and given within 1 hours of incision. Venous thrombosis prophylaxis have been ordered including bilateral sequential compression devices and chemical prophylaxis    Procedure Details: Patient was identified in the preoperative area and transported to the operating room.  They were placed supine on the or table.  General anesthesia was induced.  They were then repositioned in split leg with appropriate padding.  Whatley catheter, SCDs, OG were placed.  Abdomen was clipped, prepped, and draped in the usual sterile fashion.  Time-out was performed confirming patient, procedure, and perioperative criteria.  Veress needle was inserted at israel's point with a good drop test.  Abdomen was insufflated to 15 millimeters mercury with low initial pressures.  A millimeter Visiport was placed in the left upper quadrant.  Camera inserted and no entry injury was identified.  An additional 8 mm port was placed in the supraumbilical region.  Adhesions to the patient's prior umbilical hernia repair were sharply taken down.  An 8 mm and 12 mm port were also placed in the right side.  Patient was appropriately positioned  Omentum was tucked cephalad and small bowel adhesions to the colon anterior abdominal wall were dissected.  Small  bowel was swept to the right upper quadrant.  Sigmoid colon was thickened consistent with chronic diverticulitis in there were some adhesions to the left pelvic wall and anterior abdominal wall which were taken down sharply to straighten the colon.  Robot was then docked.  Sigmoid colon was elevated and a mesenteric window was made at the sacral promontory just inferior to the DANIEL.  Medial dissection was carried out until the left ureter was visualized and preserved.  Dissection then moved cephalad and the peritoneum overlying the DANIEL was scored.  The patient had very thick and bulky mesentery so we moved to lateral dissection took down the white line of [place]t until our 2 planes met.  During this there were significant issues with insufflation and visualization.  Multiple port sites removed despite robotic positioning into the subcutaneous tissues causing anterior abdominal wall insufflation and some crepitus.  We moved inferiorly down to the rectosigmoid colon and the mesentery was partially thin.  The remaining of the case performed laparoscopically.  The DANIEL was taken with energy device.  The mesentery at the rectosigmoid was sequentially thinned and this was divided with 2 loads of the green Endo CHRISTIE stapler.  Pfannenstiel incision was created above the pubic symphysis.  Anterior sheath was scored in a transverse fashion.  A plane was developed above the rectus muscles.  The peritoneum was elevated and entered sharply without injury.  Small Osiel wound retractor was placed.  Colon was delivered through the incision and the remaining mesentery was divided with cautery. The marginal artery of [place] was assessed with good pulsatile flow.  Descending colon was clamped and divided between a [place]ain[place] and Kocher.  An 0 Prolene pursestring was placed.  A 29 millimeter EEA stapler was selected and the anvil was secured.  Descending colon was then placed back in the abdominal cavity and we returned to laparoscopy.  Abdomen was reinsufflated and reach was evaluated.  The descending colon and anvil reached the rectal staple line easily and without tension or twisting.  The EEA handle was then placed transanally and manipulated up to the rectal staple line.  During this there was a small amount of tearing visualized just anterior to the rectal staple line.  Flex sig and leak test were performed and negative of the rectal stump.  EEA handle was reinserted and the spike was brought out through the small serosal tear just anterior to the staple line.  Staple line and attached to the anvil.  Stapler was fired without issue.  Donuts were assessed and intact.  Flexible sigmoidoscopy was performed.  Anastomosis was patent and hemostatic.  Leak test was performed and negative.  Abdominal fluid was then aspirated and bowel was returned to normal anatomic position.  Intraoperative tap block was performed in 4 quadrants using a total of 40 milliliters of 0.25% Marcaine.  Right lower quadrant port site was closed with 0 Vicryl using a Jose Castañeda suture Passer.  Abdomen was surveyed and hemostatic.  Abdomen was desufflated and the supraumbilical fascia was closed with 1 0 PDS in running fashion.  Subcutaneous tissues were irrigated and skin was closed with 4-0 Monocryl in a subcuticular fashion followed by skin glue.  Prior to complete closure all counts were confirmed correct.  Patient was then extubated and transferred to the PACU in stable condition.   Complications:  None; patient tolerated the procedure well.    Disposition: PACU - hemodynamically stable.  Condition: stable         Additional Details:     Attending Attestation: I was present and scrubbed for the entire procedure.    Raj Walton  Phone Number: [phone]

## 2024-02-07 NOTE — PROGRESS NOTES
Pharmacy Medication History Review    Shaheen Ambriz is a 58 y.o. male admitted for Diverticulitis. Pharmacy reviewed the patient's [id number] medications and allergies for accuracy.    The list below reflectives the updated PTA list. Please review each medication in order reconciliation for additional clarification and justification.  Medications Prior to Admission   Medication Sig Dispense Refill Last Dose    aspirin 81 mg EC tablet Take 1 tablet (81 mg) by mouth once daily.   2/6/2024    atorvastatin (Lipitor) 20 mg tablet Take 1 tablet (20 mg) by mouth once daily.   Past Week    FLUoxetine (PROzac) 40 mg capsule Take 1 capsule (40 mg) by mouth once daily. 90 capsule 1 Past Week    glimepiride (Amaryl) 2 mg tablet Take 1 tablet (2 mg) by mouth once daily. as directed   Past Week    loratadine (Claritin) 10 mg tablet Take 1 tablet (10 mg) by mouth once daily.   2/7/2024    omeprazole OTC (PriLOSEC OTC) 20 mg EC tablet Take 1 capsule by mouth once daily.   2/7/2024    traZODone (Desyrel) 50 mg tablet Take 1 tablet (50 mg) by mouth once daily at bedtime. 90 tablet 1 Past Week    Wixela Inhub 250-50 mcg/dose diskus inhaler INHALE 1 PUFF BY MOUTH TWICE A DAY INHALATION TWICE A DAY 90 DAYS 180 each 2 2/7/2024    albuterol sulfate (Proair Digihaler) 90 mcg/actuation aero powdr breath act w/sensor inhaler Inhale 2 puffs every 4 hours.       chlorhexidine (Peridex) 0.12 % solution Rinse mouth with 15 ml after toothbrushing the night before surgery and on the morning of surgery.  Expectorate after rinsing.  Do not swallow. (Patient not taking: Reported on 2/7/2024) 120 mL 0 Not Taking        The list below reflectives the updated allergy list. Please review each documented allergy for additional clarification and justification.  Allergies  Reviewed by Ava Florian RN on 2/7/2024        Severity Reactions Comments    Dulaglutide High Anxiety, GI Upset Critically High    Iodinated Contrast Media High Anaphylaxis,  Hives, Itching     Metformin Hcl Low GI Upset     Theophylline Low Palpitations, GI Upset             Below are additional concerns with the patient's PTA list.  Prior to Admission Medications   Prescriptions Last Dose Informant Patient Reported? Taking?   FLUoxetine (PROzac) 40 mg capsule Past Week  No Yes   Sig: Take 1 capsule (40 mg) by mouth once daily.   Wixela Inhub 250-50 mcg/dose diskus inhaler 2/7/2024  No Yes   Sig: INHALE 1 PUFF BY MOUTH TWICE A DAY INHALATION TWICE A DAY 90 DAYS   albuterol sulfate (Proair Digihaler) 90 mcg/actuation aero powdr breath act w/sensor inhaler   Yes No   Sig: Inhale 2 puffs every 4 hours.   aspirin 81 mg EC tablet 2/6/2024  Yes Yes   Sig: Take 1 tablet (81 mg) by mouth once daily.   atorvastatin (Lipitor) 20 mg tablet Past Week  Yes Yes   Sig: Take 1 tablet (20 mg) by mouth once daily.   chlorhexidine (Peridex) 0.12 % solution Not Taking  No No   Sig: Rinse mouth with 15 ml after toothbrushing the night before surgery and on the morning of surgery.  Expectorate after rinsing.  Do not swallow.   Patient not taking: Reported on 2/7/2024   coenzyme Q-10 200 mg capsule Past Week  Yes Yes   Sig: Take 2 capsules (400 mg) by mouth once daily.   docusate sodium (Colace) 100 mg capsule 2/7/2024  Yes Yes   Sig: Take 1 capsule (100 mg) by mouth 2 times a day.   gabapentin (Neurontin) 100 mg capsule   No No   Sig: Take one capsule by mouth starting three days before surgery at bedtime.   Patient not taking: Reported on 1/25/2024   glimepiride (Amaryl) 2 mg tablet Past Week  Yes Yes   Sig: Take 1 tablet (2 mg) by mouth once daily. as directed   loratadine (Claritin) 10 mg tablet 2/7/2024  Yes Yes   Sig: Take 1 tablet (10 mg) by mouth once daily.   metroNIDAZOLE (Flagyl) 250 mg tablet Not Taking  No No   Sig: Take one tablet at 6p, 7p, and 11p the night before surgery.   Patient not taking: Reported on 2/7/2024   montelukast (Singulair) 10 mg tablet   No No   Sig: TAKE 1 TABLET BY MOUTH  EVERY DAY IN THE EVENING   neomycin (Mycifradin) 500 mg tablet Not Taking  No No   Sig: Take two tabs by mouth at 6p, 7pm, and 11p the night before surgery.   Patient not taking: Reported on 2/7/2024   omeprazole OTC (PriLOSEC OTC) 20 mg EC tablet 2/7/2024  Yes Yes   Sig: Take 1 capsule by mouth once daily.   traZODone (Desyrel) 50 mg tablet Past Week  No Yes   Sig: Take 1 tablet (50 mg) by mouth once daily at bedtime.      Facility-Administered Medications: None    Per patient    Kamla Stout CPhT

## 2024-02-07 NOTE — PERIOPERATIVE NURSING NOTE
1218 Pt to [facility] on [facility]. Bedside report given to this rn by or rn and aa.    1228 Pt blood sugar reads 180.     1232 Called pt wife and updated her on pt status and plan of care.

## 2024-02-07 NOTE — PERIOPERATIVE NURSING NOTE
Procedure: Laparoscopic sigmoid colon resection  PMH chronic diverticulitis flare ups   Anesthesia type (i.e. general, regional, MAC): general  Estimated blood loss (EBL) in OR: 10  Orientation/mental status:  IV site(s), and drips/fluids currently infusin g left wrist, 18g right hand  PO status (last oral intake):   O2 requirements: 94% on 2lpm nc   Current pain level & last pain/nausea medication dose/time given:  Next antibiotic due @   Last tranexamic acid dose given @  Last void/Gutierrez in place: 16 [place] gutierrez in place with orders to discharge the day of surgery  Equipment sent with patient (i.e. Polar Cube, TENs unit, walker, crutches): SCD  SCDs on (yes/no): bilateral lower leg  Belongings sent with patient: CORINA bag  Emergency contact (name, relationship, phone number): Lynn [phone]

## 2024-02-07 NOTE — ANESTHESIA POSTPROCEDURE EVALUATION
Patient: Shaheen Ambriz    Procedure Summary       Date: 02/07/24 Room / Location: [facility] / [facility]    Anesthesia Start: 0736 Anesthesia Stop: 1217    Procedure: Robot Assisted Laparoscopic Sigmoid Colon Resection Diagnosis:       Diverticulitis      (Diverticulitis [K57.92])    Surgeons: Raj Walton MD Responsible Provider: Doug Mayers MD    Anesthesia Type: general ASA Status: 3            Anesthesia Type: general    Vitals Value Taken Time   /97 02/07/24 1218   Temp 36.5 °C (97.7 °F) 02/07/24 1218   Pulse 95 02/07/24 1218   Resp 14 02/07/24 1218   SpO2 95 % 02/07/24 1218       Anesthesia Post Evaluation    Patient location during evaluation: bedside  Patient participation: waiting for patient participation  Level of consciousness: sedated  Pain management: adequate  Airway patency: patent  Cardiovascular status: acceptable  Respiratory status: acceptable  Hydration status: acceptable  Postoperative Nausea and Vomiting: none        No notable events documented.

## 2024-02-08 LAB
ANION GAP SERPL CALC-SCNC: 13 MMOL/L (ref 10–20)
BUN SERPL-MCNC: 16 MG/DL (ref 6–23)
CALCIUM SERPL-MCNC: 8.6 MG/DL (ref 8.6–10.3)
CHLORIDE SERPL-SCNC: 102 MMOL/L (ref 98–107)
CO2 SERPL-SCNC: 26 MMOL/L (ref 21–32)
CREAT SERPL-MCNC: 1.17 MG/DL (ref 0.5–1.3)
EGFRCR SERPLBLD CKD-EPI 2021: 73 ML/MIN/1.73M*2
ERYTHROCYTE [DISTWIDTH] IN BLOOD BY AUTOMATED COUNT: 14.9 % (ref 11.5–14.5)
GLUCOSE BLD MANUAL STRIP-MCNC: 117 MG/DL (ref 74–99)
GLUCOSE BLD MANUAL STRIP-MCNC: 140 MG/DL (ref 74–99)
GLUCOSE BLD MANUAL STRIP-MCNC: 96 MG/DL (ref 74–99)
GLUCOSE SERPL-MCNC: 112 MG/DL (ref 74–99)
HCT VFR BLD AUTO: 42 % (ref 41–52)
HGB BLD-MCNC: 13.4 G/DL (ref 13.5–17.5)
MCH RBC QN AUTO: 27.5 PG (ref 26–34)
MCHC RBC AUTO-ENTMCNC: 31.9 G/DL (ref 32–36)
MCV RBC AUTO: 86 FL (ref 80–100)
NRBC BLD-RTO: 0 /100 WBCS (ref 0–0)
PLATELET # BLD AUTO: 384 X10*3/UL (ref 150–450)
POTASSIUM SERPL-SCNC: 3.8 MMOL/L (ref 3.5–5.3)
RBC # BLD AUTO: 4.88 X10*6/UL (ref 4.5–5.9)
SODIUM SERPL-SCNC: 137 MMOL/L (ref 136–145)
WBC # BLD AUTO: 14.4 X10*3/UL (ref 4.4–11.3)

## 2024-02-08 PROCEDURE — 97161 PT EVAL LOW COMPLEX 20 MIN: CPT | Mod: GP

## 2024-02-08 PROCEDURE — 82947 ASSAY GLUCOSE BLOOD QUANT: CPT

## 2024-02-08 PROCEDURE — C9113 INJ PANTOPRAZOLE SODIUM, VIA: HCPCS | Performed by: SURGERY

## 2024-02-08 PROCEDURE — 2500000004 HC RX 250 GENERAL PHARMACY W/ HCPCS (ALT 636 FOR OP/ED): Performed by: NURSE PRACTITIONER

## 2024-02-08 PROCEDURE — 36415 COLL VENOUS BLD VENIPUNCTURE: CPT | Performed by: SURGERY

## 2024-02-08 PROCEDURE — 85027 COMPLETE CBC AUTOMATED: CPT | Performed by: SURGERY

## 2024-02-08 PROCEDURE — 2500000001 HC RX 250 WO HCPCS SELF ADMINISTERED DRUGS (ALT 637 FOR MEDICARE OP): Performed by: NURSE PRACTITIONER

## 2024-02-08 PROCEDURE — 2500000001 HC RX 250 WO HCPCS SELF ADMINISTERED DRUGS (ALT 637 FOR MEDICARE OP): Performed by: SURGERY

## 2024-02-08 PROCEDURE — 1100000001 HC PRIVATE ROOM DAILY

## 2024-02-08 PROCEDURE — 2500000004 HC RX 250 GENERAL PHARMACY W/ HCPCS (ALT 636 FOR OP/ED): Performed by: SURGERY

## 2024-02-08 PROCEDURE — 97165 OT EVAL LOW COMPLEX 30 MIN: CPT | Mod: GO

## 2024-02-08 PROCEDURE — 99232 SBSQ HOSP IP/OBS MODERATE 35: CPT | Performed by: NURSE PRACTITIONER

## 2024-02-08 PROCEDURE — 2500000002 HC RX 250 W HCPCS SELF ADMINISTERED DRUGS (ALT 637 FOR MEDICARE OP, ALT 636 FOR OP/ED): Performed by: SURGERY

## 2024-02-08 PROCEDURE — 94640 AIRWAY INHALATION TREATMENT: CPT

## 2024-02-08 PROCEDURE — 2500000002 HC RX 250 W HCPCS SELF ADMINISTERED DRUGS (ALT 637 FOR MEDICARE OP, ALT 636 FOR OP/ED)

## 2024-02-08 PROCEDURE — 9420000001 HC RT PATIENT EDUCATION 5 MIN

## 2024-02-08 PROCEDURE — 80048 BASIC METABOLIC PNL TOTAL CA: CPT | Performed by: SURGERY

## 2024-02-08 RX ORDER — GLIMEPIRIDE 2 MG/1
2 TABLET ORAL
Status: DISCONTINUED | OUTPATIENT
Start: 2024-02-08 | End: 2024-02-10 | Stop reason: HOSPADM

## 2024-02-08 RX ORDER — ASPIRIN 81 MG/1
81 TABLET ORAL DAILY
Status: DISCONTINUED | OUTPATIENT
Start: 2024-02-08 | End: 2024-02-10 | Stop reason: HOSPADM

## 2024-02-08 RX ORDER — ATORVASTATIN CALCIUM 20 MG/1
20 TABLET, FILM COATED ORAL NIGHTLY
Status: DISCONTINUED | OUTPATIENT
Start: 2024-02-08 | End: 2024-02-10 | Stop reason: HOSPADM

## 2024-02-08 RX ORDER — LORATADINE 10 MG/1
10 TABLET ORAL DAILY
Status: DISCONTINUED | OUTPATIENT
Start: 2024-02-08 | End: 2024-02-10 | Stop reason: HOSPADM

## 2024-02-08 RX ORDER — TRAZODONE HYDROCHLORIDE 50 MG/1
50 TABLET ORAL NIGHTLY
Status: DISCONTINUED | OUTPATIENT
Start: 2024-02-08 | End: 2024-02-10 | Stop reason: HOSPADM

## 2024-02-08 RX ORDER — MONTELUKAST SODIUM 10 MG/1
10 TABLET ORAL EVERY EVENING
Status: DISCONTINUED | OUTPATIENT
Start: 2024-02-08 | End: 2024-02-10 | Stop reason: HOSPADM

## 2024-02-08 RX ADMIN — FORMOTEROL FUMARATE DIHYDRATE 20 MCG: 20 SOLUTION RESPIRATORY (INHALATION) at 21:09

## 2024-02-08 RX ADMIN — GABAPENTIN 300 MG: 300 CAPSULE ORAL at 20:48

## 2024-02-08 RX ADMIN — TRAZODONE HYDROCHLORIDE 50 MG: 50 TABLET ORAL at 20:48

## 2024-02-08 RX ADMIN — ASPIRIN 81 MG: 81 TABLET, COATED ORAL at 15:55

## 2024-02-08 RX ADMIN — SODIUM CHLORIDE 40 ML/HR: 9 INJECTION, SOLUTION INTRAVENOUS at 13:55

## 2024-02-08 RX ADMIN — LORATADINE 10 MG: 10 TABLET ORAL at 15:55

## 2024-02-08 RX ADMIN — ALBUTEROL SULFATE 2.5 MG: 2.5 SOLUTION RESPIRATORY (INHALATION) at 08:36

## 2024-02-08 RX ADMIN — ENOXAPARIN SODIUM 40 MG: 40 INJECTION SUBCUTANEOUS at 15:54

## 2024-02-08 RX ADMIN — MONTELUKAST 10 MG: 10 TABLET, FILM COATED ORAL at 20:48

## 2024-02-08 RX ADMIN — FORMOTEROL FUMARATE DIHYDRATE 20 MCG: 20 SOLUTION RESPIRATORY (INHALATION) at 08:32

## 2024-02-08 RX ADMIN — PANTOPRAZOLE SODIUM 20 MG: 40 INJECTION, POWDER, FOR SOLUTION INTRAVENOUS at 09:23

## 2024-02-08 RX ADMIN — GABAPENTIN 300 MG: 300 CAPSULE ORAL at 15:55

## 2024-02-08 RX ADMIN — ATORVASTATIN CALCIUM 20 MG: 20 TABLET, FILM COATED ORAL at 20:47

## 2024-02-08 RX ADMIN — OXYCODONE HYDROCHLORIDE 10 MG: 5 TABLET ORAL at 05:50

## 2024-02-08 RX ADMIN — BUDESONIDE INHALATION 0.5 MG: 0.5 SUSPENSION RESPIRATORY (INHALATION) at 21:09

## 2024-02-08 RX ADMIN — PROMETHAZINE HYDROCHLORIDE 25 MG: 25 TABLET ORAL at 09:23

## 2024-02-08 RX ADMIN — GABAPENTIN 300 MG: 300 CAPSULE ORAL at 09:23

## 2024-02-08 RX ADMIN — BUDESONIDE INHALATION 0.5 MG: 0.5 SUSPENSION RESPIRATORY (INHALATION) at 08:33

## 2024-02-08 ASSESSMENT — COGNITIVE AND FUNCTIONAL STATUS - GENERAL
CLIMB 3 TO 5 STEPS WITH RAILING: A LITTLE
DAILY ACTIVITIY SCORE: 22
WALKING IN HOSPITAL ROOM: A LITTLE
TURNING FROM BACK TO SIDE WHILE IN FLAT BAD: A LITTLE
TOILETING: A LITTLE
DAILY ACTIVITIY SCORE: 24
CLIMB 3 TO 5 STEPS WITH RAILING: A LITTLE
MOVING TO AND FROM BED TO CHAIR: A LITTLE
STANDING UP FROM CHAIR USING ARMS: A LITTLE
MOBILITY SCORE: 23
HELP NEEDED FOR BATHING: A LITTLE
MOBILITY SCORE: 19

## 2024-02-08 ASSESSMENT — ACTIVITIES OF DAILY LIVING (ADL)
LACK_OF_TRANSPORTATION: NO
ADL_ASSISTANCE: INDEPENDENT
ADL_ASSISTANCE: INDEPENDENT

## 2024-02-08 ASSESSMENT — PAIN - FUNCTIONAL ASSESSMENT
PAIN_FUNCTIONAL_ASSESSMENT: 0-10

## 2024-02-08 ASSESSMENT — PAIN SCALES - GENERAL
PAINLEVEL_OUTOF10: 7
PAINLEVEL_OUTOF10: 3
PAINLEVEL_OUTOF10: 6
PAINLEVEL_OUTOF10: 6

## 2024-02-08 NOTE — PROGRESS NOTES
02/08/24 1012   Discharge Planning   Living Arrangements Spouse/significant other   Support Systems Spouse/significant other   Assistance Needed Independent   Type of Residence Private residence   Number of Stairs to Enter Residence 3   Number of Stairs Within Residence 14   Do you have animals or pets at home? Yes   Type of Animals or Pets 1 cat and 1 dog   Who is requesting discharge planning? Other (Comment)  ([facility] admission)   Home or Post Acute Services None   Patient expects to be discharged to: Home   Does the patient need discharge transport arranged? No   Financial Resource Strain   How hard is it for you to pay for the very basics like food, housing, medical care, and heating? Not hard   Housing Stability   In the last 12 months, was there a time when you were not able to pay the mortgage or rent on time? N   In the last 12 months, how many places have you lived? 1   In the last 12 months, was there a time when you did not have a steady place to sleep or slept in a shelter (including now)? N   Transportation Needs   In the past 12 months, has lack of transportation kept you from medical appointments or from getting medications? no   In the past 12 months, has lack of transportation kept you from meetings, work, or from getting things needed for daily living? No     Met with patient at the bedside to discuss discharge plan.  He is s/p sigmoid colon resection.  He is independent with his care including driving and is up ambulating in room  He lives with his wife, who will transport him home at discharge.  No homecare needs.  PLAN: advance diet, await RBF  DISP: home with wife  BARRIER: RBF, tolerate diet  ADOD: 3 days  Lindsay Mathis RN

## 2024-02-08 NOTE — PROGRESS NOTES
Physical Therapy    Physical Therapy Evaluation    Patient Name: Shaheen Ambriz  MRN: [id number]  Today's Date: 2/8/2024   Time Calculation  Start Time: 1033  Stop Time: 1049  Time Calculation (min): 16 min    Assessment/Plan   PT Assessment  PT Assessment Results: Decreased strength, Decreased endurance, Impaired balance, Decreased mobility, Pain, Orthopedic restrictions  Rehab Prognosis: Good  Evaluation/Treatment Tolerance: Patient tolerated treatment well  Medical Staff Made Aware: Yes  End of Session Communication: Bedside nurse  Assessment Comment: Pt demonstrating deficits in generalized strength, endurance and balance as well as increased pain resulting in impaired functional mobility, gait and self care. Due to the impairments listed above, pt would benefit from skilled physical therapy services in acute care setting as well as additional therapy recommendation at DC listed below  End of Session Patient Position: Bed, 3 rail up, Alarm off, not on at start of session  IP OR SWING BED PT PLAN  Inpatient or Swing Bed: Inpatient  PT Plan  Treatment/Interventions: Bed mobility, Transfer training, Gait training, Stair training, Balance training, Neuromuscular re-education, Strengthening, Endurance training, Therapeutic exercise, Therapeutic activity, Home exercise program  PT Plan: Skilled PT  PT Frequency: 4 times per week  PT Discharge Recommendations: Low intensity level of continued care  PT - OK to Discharge: Yes (PT POC initiated this date)      Subjective   General Visit Information:  General  Reason for Referral: Pt is a 59 yo male presenting to [facility] POD #1 s/p robotic assisted lap colectomy, sigmoid colon resection and partial anastomosis. Primary dx of diverticulitis  Referred By: Raj Hannah MD  Past Medical History Relevant to Rehab:   Medical History[]Expand by Default        Past Medical History:   Diagnosis Date    Anxiety      Asthma       triggers: dust, weather changes-last use 9/2023     Diabetes mellitus (CMS/[facility])       A1C=7.1 10/12/23    Diverticulosis       multiple diverticulitis flares in past year    Factor V Leiden (CMS/[facility])       daily ASA, no hematologist, has been tx w/ Lovenox in past post op    GERD (gastroesophageal reflux disease)      Hyperlipidemia      Hypertension      Nephrolithiasis       last 2007    Sinusitis       r/t allergies    Syncope 09/2022     single episode, evaluated by Dr. Davey Garcia.  ECHO 9/12/22 WNL, Zio monitor WNL per patient.  No recurrance of symptoms            Surgical History         Past Surgical History:   Procedure Laterality Date    KNEE SURGERY Right 06/2023    LITHOTRIPSY   2007    SHOULDER Left 06/2019     clavical decompression    UMBILICAL HERNIA REPAIR   12/2021    VASECTOMY   2006        Co-Treatment: OT  Co-Treatment Reason: for maximal pt safety and participation  Prior to Session Communication: Bedside nurse  Patient Position Received: Bed, 3 rail up, Alarm off, not on at start of session  General Comment: Pt pleasant and agreeable to PT session. Pt mildly lethargic throughout session however denies nausea/lightheadedness during mobility (RN reporting nauseous prior to session though has since been treated with medication)  Home Living:  Home Living  Type of Home: House  Lives With: Spouse  Home Adaptive Equipment:  (Wife owns SPC and FWW however continues to use)  Home Layout: Two level (2nd floor bedroom and bathroom with full flight of stairs with R side HR)  Home Access: Stairs to enter without rails  Entrance Stairs-Rails: None  Entrance Stairs-Number of Steps: 3  Bathroom Shower/Tub: Tub/shower unit  Bathroom Toilet: Handicapped height  Bathroom Equipment: Shower chair with back  Prior Level of Function:  Prior Function Per Pt/Caregiver Report  Level of [place]: Independent with ADLs and functional transfers, Independent with homemaking with ambulation  ADL Assistance: Independent  Homemaking Assistance:  Independent  Ambulatory Assistance: Independent  Prior Function Comments: Pt previously independent for all self care, mobility (no device), home management, 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  Precautions:  Precautions  Medical Precautions: Fall precautions, Abdominal precautions  Vital Signs:       Objective   Pain:  Pain Assessment  Pain Assessment: 0-10  Pain Score: 6  Pain Type: Surgical pain  Pain Location: Abdomen  Pain Orientation: Lower  Cognition:  Cognition  Overall Cognitive Status: Within Functional Limits  Orientation Level: Oriented X4  Following Commands: Follows all commands and directions without difficulty  Safety Judgment: Good awareness of safety precautions  Problem Solving: Able to problem solve independently  Cognition Comments: Mild lethargy noted; flat affect  Insight: Within function limits  Impulsive: Within functional limits    General Assessments:  Activity Tolerance  Endurance: Tolerates 30 min exercise with multiple rests    Sensation  Light Touch: No apparent deficits    Coordination  Movements are Fluid and Coordinated: Yes    Static Sitting Balance  Static Sitting-Balance Support: No upper extremity supported, Feet supported  Static Sitting-Level of Assistance: Modified independent  Static Sitting-Comment/Number of Minutes: 2 min    Static Standing Balance  Static Standing-Balance Support: No upper extremity supported  Static Standing-Level of Assistance: Contact guard, Close supervision  Static Standing-Comment/Number of Minutes: 1 min  Functional Assessments:  Bed Mobility  Bed Mobility: Yes  Bed Mobility 1  Bed Mobility 1: Supine to sitting  Level of Assistance 1: Moderate assistance  Bed Mobility Comments 1: Verbal cues for log roll sequencing and mod A to elevate trunk from sidelying position  Bed Mobility 2  Bed Mobility  2: Sitting to supine  Level of Assistance 2: Close supervision  Bed Mobility Comments 2: Verbal cues for log roll sequencing and increased time to  complete    Transfers  Transfer: Yes  Transfer 1  Transfer From 1: Sit to  Transfer to 1: Stand  Technique 1: Sit to stand, Stand to sit  Transfer Device 1: Gait belt  Transfer Level of Assistance 1: Contact guard  Trials/Comments 1: Increased time to complete    Ambulation/Gait Training  Ambulation/Gait Training Performed: Yes  Ambulation/Gait Training 1  Surface 1: Level tile  Device 1: No device  Gait Support Devices: Gait belt  Assistance 1: Contact guard  Quality of Gait 1:  (wide YODIT and mild shuffling gait pattern. No LOB noted and decreased gait roosevelt)  Comments/Distance (ft) 1: 30ft  Extremity/Trunk Assessments:  RLE   RLE : Within Functional Limits  LLE   LLE : Within Functional Limits  Outcome Measures:  [facility] Basic Mobility  Turning from your back to your side while in a flat bed without using bedrails: None  Moving from lying on your back to sitting on the side of a flat bed without using bedrails: A little  Moving to and from bed to chair (including a wheelchair): A little  Standing up from a chair using your arms (e.g. wheelchair or bedside chair): A little  To walk in hospital room: A little  Climbing 3-5 steps with railing: A little  Basic Mobility - Total Score: 19    Encounter Problems       Encounter Problems (Active)       Balance       LTG - Patient will tolerate standing       Start:  02/08/24    Expected End:  02/22/24       >5 min during dynamic balance with SUP (no UE support)            Mobility       LTG - Patient will navigate steps        Start:  02/08/24    Expected End:  02/22/24       1) x4 steps with no HR (Mod Ind)  2) x12 steps with R HR (mod Ind)         STG - Patient will ambulate       Start:  02/08/24    Expected End:  02/22/24       >200ft with no device Mod Ind            Pain - Adult          Transfers       STG - Patient will perform bed mobility       Start:  02/08/24    Expected End:  02/22/24       Mod ind via log roll         STG - Patient will transfer sit to and  from stand       Start:  02/08/24    Expected End:  02/22/24       Mod Ind                Education Documentation  Precautions, taught by Neel Kelly, PT at 2/8/2024  2:16 PM.  Learner: Patient  Readiness: Acceptance  Method: Explanation  Response: Verbalizes Understanding    Home Exercise Program, taught by Neel Kelly, PT at 2/8/2024  2:16 PM.  Learner: Patient  Readiness: Acceptance  Method: Explanation  Response: Verbalizes Understanding    Mobility Training, taught by Neel Kelly, PT at 2/8/2024  2:16 PM.  Learner: Patient  Readiness: Acceptance  Method: Explanation  Response: Verbalizes Understanding    Education Comments  No comments found.

## 2024-02-08 NOTE — PROGRESS NOTES
Occupational Therapy    Evaluation    Patient Name: Shaheen Ambriz  MRN: [id number]  Today's Date: 2/8/2024  Time Calculation  Start Time: 1032  Stop Time: 1048  Time Calculation (min): 16 min        Assessment:  End of Session Communication: Bedside nurse  End of Session Patient Position: Bed, 3 rail up, Alarm off, not on at start of session  OT Assessment Results: Decreased ADL status, Decreased endurance  Plan:  Treatment Interventions: ADL retraining, Functional transfer training  OT Frequency: 3 times per week  OT Discharge Recommendations: Low intensity level of continued care  OT Recommended Transfer Status: Stand by assist  OT - OK to Discharge: Yes  Treatment Interventions: ADL retraining, Functional transfer training    Subjective   Current Problem:  1. Diverticulitis  Surgical Pathology Exam    Surgical Pathology Exam        General:  General  Reason for Referral: Pt is a 59 yo male presenting [place] [facility] s/p robotic assisted lap colectomy, sigmoid colon resection and partial anastomosis. Primary dx of diverticulitis  Referred By: Raj Hannah MD  Past Medical History Relevant to Rehab:   Past Medical History:   Diagnosis Date    Anxiety     Asthma     triggers: dust, weather changes-last use 9/2023    Diabetes mellitus (CMS/[facility])     A1C=7.1 10/12/23    Diverticulosis     multiple diverticulitis flares in past year    Factor V Leiden (CMS/[facility])     daily ASA, no hematologist, has been tx w/ Lovenox in past post op    GERD (gastroesophageal reflux disease)     Hyperlipidemia     Hypertension     Nephrolithiasis     last 2007    Sinusitis     r/t allergies    Syncope 09/2022    single episode, evaluated by Dr. Davey Garcia.  ECHO 9/12/22 WNL, Zio monitor WNL per patient.  No recurrance of symptoms       Co-Treatment: PT  Co-Treatment Reason: for maximal pt safety and participation  Prior to Session Communication: Bedside nurse  Patient Position Received: Bed, 3 rail up, Alarm off, not on at start of  session  General Comment: Pt pleasant and agreeable to OT session. Pt mildly lethargic throughout session however denies nausea/lightheadedness during mobility (RN reporting nauseous prior to session though has since been treated with medication)  Precautions:  Medical Precautions: Fall precautions, Abdominal precautions  Vital Signs:     Pain:  Pain Assessment  Pain Assessment: 0-10  Pain Score: 6  Pain Type: Surgical pain  Pain Location: Abdomen  Pain Orientation: Lower    Objective   Cognition:  Overall Cognitive Status: Within Functional Limits  Orientation Level: Oriented X4  Following Commands: Follows all commands and directions without difficulty  Safety Judgment: Good awareness of safety precautions  Problem Solving: Able to problem solve independently  Cognition Comments: Mild lethargy; flat affect  Insight: Within function limits  Impulsive: Within functional limits           Home Living:  Type of Home: House  Lives With: Spouse  Home Adaptive Equipment:  (Wife owns SPC and FWW however continues to use)  Home Layout: Two level (2nd floor bedroom and bathroom with full flight of stairs with R side HR)  Home Access: Stairs to enter without rails  Entrance Stairs-Rails: None  Entrance Stairs-Number of Steps: 3  Bathroom Shower/Tub: Tub/shower unit  Bathroom Toilet: Handicapped height  Bathroom Equipment: Shower chair with back  Prior Function:  Level of [place]: Independent with ADLs and functional transfers, Independent with homemaking with ambulation  ADL Assistance: Independent  Homemaking Assistance: Independent  Ambulatory Assistance: Independent  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[employer])  Hand Dominance: Right  Prior Function Comments: Pt previously independent for all self care, mobility (no device), home management, 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  IADL History:  Current License: Yes  Mode of Transportation: Car  ADL:  LE Dressing Assistance: Stand by  LE  Dressing Deficit: Supervision/safety, Don/doff R sock, Don/doff L sock, Setup  Activity Tolerance:  Endurance: Tolerates 30 min exercise with multiple rests  Bed Mobility/Transfers: Bed Mobility  Bed Mobility: Yes  Bed Mobility 1  Bed Mobility 1: Supine to sitting  Level of Assistance 1: Moderate assistance  Bed Mobility 2  Bed Mobility  2: Sitting to supine  Level of Assistance 2: Close supervision    Transfers  Transfer: Yes  Transfer 1  Transfer From 1: Sit to  Transfer to 1: Stand  Technique 1: Sit to stand, Stand to sit  Transfer Level of Assistance 1: Contact guard  Trials/Comments 1: Increased time noted       Vision:Vision - Basic Assessment  Current Vision: Wears glasses for distance only    Coordination:  Movements are Fluid and Coordinated: Yes   Hand Function: WFL     Extremities:   and        Outcome Measures:[facility] Daily Activity  Putting on and taking off regular lower body clothing: None  Bathing (including washing, rinsing, drying): A little  Putting on and taking off regular upper body clothing: None  Toileting, which includes using toilet, bedpan or urinal: A little  Taking care of personal grooming such as brushing teeth: None  Eating Meals: None  Daily Activity - Total Score: 22        Education Documentation  Body Mechanics, taught by Lesley Villavicencio OT at 2/8/2024  2:43 PM.  Learner: Patient  Readiness: Acceptance  Method: Explanation  Response: Verbalizes Understanding, Demonstrated Understanding    Precautions, taught by Lesley Villavicencio OT at 2/8/2024  2:43 PM.  Learner: Patient  Readiness: Acceptance  Method: Explanation  Response: Verbalizes Understanding, Demonstrated Understanding    ADL Training, taught by Lesley Villavicencio OT at 2/8/2024  2:43 PM.  Learner: Patient  Readiness: Acceptance  Method: Explanation  Response: Verbalizes Understanding, Demonstrated Understanding    Education Comments  No comments found.        OP EDUCATION:  Education  Individual(s) Educated: Patient  Education Provided:  Diagnosis & Precautions, Fall precautons, Risk and benefits of OT discussed with patient or other, POC discussed and agreed upon  Risk and Benefits Discussed with Patient/Caregiver/Other: yes  Patient/Caregiver Demonstrated Understanding: yes  Plan of Care Discussed and Agreed Upon: yes  Patient Response to Education: Patient/Caregiver Verbalized Understanding of Information, Patient/Caregiver Performed Return Demonstration of Exercises/Activities, Patient/Caregiver Asked Appropriate Questions    Goals:  Encounter Problems       Encounter Problems (Active)       ADLs       Patient with complete lower body dressing with independent level of assistance donning and doffing all LE clothes  with no adaptive equipment while edge of bed        Start:  02/08/24    Expected End:  02/22/24            Patient will complete daily grooming tasks brushing teeth, shaving, and washing face/hair with independent level of assistance and PRN adaptive equipment while standing.       Start:  02/08/24    Expected End:  02/22/24            Patient will complete toileting including hygiene clothing management/hygiene with modified independent level of assistance and raised toilet seat and grab bars.       Start:  02/08/24    Expected End:  02/22/24               TRANSFERS       Patient will perform bed mobility modified independent level of assistance and bed rails in order to improve safety and independence with mobility       Start:  02/08/24    Expected End:  02/22/24            Patient will complete sit to stand transfer with modified independent level of assistance and least restrictive device in order to improve safety and prepare for out of bed mobility.       Start:  02/08/24    Expected End:  02/22/24

## 2024-02-08 NOTE — PROGRESS NOTES
02/08/24 1016   [facility] Disability Status   Are you deaf or do you have serious difficulty hearing? N   Are you blind or do you have serious difficulty seeing, even when wearing glasses? N   Because of a physical, mental, or emotional condition, do you have serious difficulty concentrating, remembering, or making decisions? (5 years old or older) N   Do you have serious difficulty walking or climbing stairs? N   Do you have serious difficulty dressing or bathing? N   Because of a physical, mental, or emotional condition, do you have serious difficulty doing errands alone such as visiting the doctor? N

## 2024-02-08 NOTE — PROGRESS NOTES
Shaheen Ambriz is a 58 y.o. male on day 1 of admission presenting with Diverticulitis.    Subjective   NAEO. Patient reports feeling really nauseous after eating jello and a lemon ice this morning, no vomiting. Denies fever, chills, CP, and SOB.        Objective     Physical Exam  Constitutional:       Appearance: Normal appearance.   Eyes:      Conjunctiva/sclera: Conjunctivae normal.   Cardiovascular:      Rate and Rhythm: Normal rate and regular rhythm.   Pulmonary:      Effort: Pulmonary effort is normal.   Abdominal:      Comments: Abdomen is moderately distended, soft, appropriately tender, Lap sites with Dermabond, C/D/I, edges well approximated, minor bruising without drainage or hematoma.     Genitourinary:     Comments: Voiding without difficulty    Skin:     General: Skin is warm and dry.   Neurological:      Mental Status: He is oriented to person, place, and time.   Psychiatric:         Mood and Affect: Mood normal.         Behavior: Behavior normal.         Last Recorded Vitals  Blood pressure 99/64, pulse 90, temperature 37.4 °C (99.4 °F), temperature source Oral, resp. rate 18, height 1.829 m (6'), weight 119 kg (261 lb 11 oz), SpO2 90 %.  Intake/Output last 3 Shifts:  I/O last 3 completed shifts:  In: 1125 (9.5 mL/kg) [I.V.:125 (1.1 mL/kg); IV Piggyback:1000]  Out: 1100 (9.3 mL/kg) [Urine:1100 (0.3 mL/kg/hr)]  Weight: 118.7 kg     Medications:  Scheduled medications  budesonide, 0.5 mg, nebulization, BID  enoxaparin, 40 mg, subcutaneous, q24h  FLUoxetine, 40 mg, oral, Daily  formoterol, 20 mcg, nebulization, BID  gabapentin, 300 mg, oral, TID  montelukast, 10 mg, oral, Daily  pantoprazole, 20 mg, intravenous, q24h GREGORY      Continuous medications  sodium chloride 0.9%, 40 mL/hr, Last Rate: 40 mL/hr (02/07/24 2112)      PRN medications  PRN medications: acetaminophen, albuterol, HYDROmorphone, melatonin, naloxone, oxyCODONE, oxyCODONE, oxygen, promethazine **OR** promethazine    Relevant  Results  Results for orders placed or performed during the hospital encounter of 02/07/24 (from the past 24 hour(s))   POCT GLUCOSE   Result Value Ref Range    POCT Glucose 174 (H) 74 - 99 mg/dL   POCT GLUCOSE   Result Value Ref Range    POCT Glucose 106 (H) 74 - 99 mg/dL   Basic Metabolic Panel   Result Value Ref Range    Glucose 112 (H) 74 - 99 mg/dL    Sodium 137 136 - 145 mmol/L    Potassium 3.8 3.5 - 5.3 mmol/L    Chloride 102 98 - 107 mmol/L    Bicarbonate 26 21 - 32 mmol/L    Anion Gap 13 10 - 20 mmol/L    Urea Nitrogen 16 6 - 23 mg/dL    Creatinine 1.17 0.50 - 1.30 mg/dL    eGFR 73 >60 mL/min/1.73m*2    Calcium 8.6 8.6 - 10.3 mg/dL   CBC   Result Value Ref Range    WBC 14.4 (H) 4.4 - 11.3 x10*3/uL    nRBC 0.0 0.0 - 0.0 /100 WBCs    RBC 4.88 4.50 - 5.90 x10*6/uL    Hemoglobin 13.4 (L) 13.5 - 17.5 g/dL    Hematocrit 42.0 41.0 - 52.0 %    MCV 86 80 - 100 fL    MCH 27.5 26.0 - 34.0 pg    MCHC 31.9 (L) 32.0 - 36.0 g/dL    RDW 14.9 (H) 11.5 - 14.5 %    Platelets 384 150 - 450 x10*3/uL   POCT GLUCOSE   Result Value Ref Range    POCT Glucose 117 (H) 74 - 99 mg/dL     ECG 12 lead (Clinic Performed)    Result Date: 1/26/2024  Normal sinus rhythm Normal ECG When compared with ECG of 31-AUG-2022 08:07, No significant change was found Confirmed by Rajesh Jo ([id number]) on 1/26/2024 10:49:30 AM       Assessment/Plan   Principal Problem:    Diverticulitis  Active Problems:    Obesity    Factor V Leiden (CMS/HCC)    Shaheen Ambriz is a 57 yo male who is admitted after robotic assisted lx sigmoid resection, intra-operative findings showed thickened sigmoid colon with small bowel adhesions with chronic diverticulitis.     Neuro:  Hx: anxiety  Acute post-op pain- well controlled with oxycodone  - OOB/ ambulate 5x per day   - continue prozac       CV: VSS  Hx: HLD, HTN  - VS every 4 hours       Pulm: NAD, on RA, lungs CTAB  Hx: asthma  - IS every hour while awake   - Pulse ox every 4 hours with VS   - continue formeterol,  budesonide, and singulair    GI: abdomen soft, appropriately tender, moderately distended, Lap sites with Dermabond, C/D/I, edges well approximated, minor bruising without drainage or hematoma.  Hx: diverticulosis, GERD  POD #1 s/p lx sigmoid resection   - continue CLD  - ensure surgery when tolerating diet  - PRN antiemetic   - dietician following       : voiding without difficulty   Hx: nephrolithiasis  - Monitor I&Os     HEME: DVT Proph   Hx: factor V Leiden  - SCDs/ ambulate/ lovenox  - H/H stable  - no s/s of bleeding       Endo:   Hx: DM      ID: afebrile  - Monitor for s/s infection  - WBC 14.4     Dispo: oob and walking as much as possible, continue CLD, awaiting RBF. Discussed with Dr Walton.     I spent 30 minutes in the professional and overall care of this patient.      Kamla Reid, APRN-CNP  Abdominal discomfort improved, tolerating liquids without nausea, passing flatus.  Slow with diet, continue clear liquids for tonight, DVT prophylaxis

## 2024-02-08 NOTE — PROGRESS NOTES
02/08/24 1016   Current Planned Discharge Disposition   Current Planned Discharge Disposition Home

## 2024-02-08 NOTE — POST-PROCEDURE NOTE
Mr. Ambriz is a 58 year old male who is POD #0 from a robot assisted laparoscopic sigmoid colon resection (Intraoperative Findings: Thickened sigmoid colon with small bowel adhesions consistent with chronic diverticulitis). He does endorse abdominal cramping/spasms post-operatively. He denies any nausea, vomiting, fevers or chills. He denies passing gas/having a BM since OR. He does have a gutierrez catheter in place at the time of my assessment.     PE:  Constitutional: A&Ox3, calm and cooperative, NAD.  Eyes: PERRL, clear sclera.  ENMT: Moist mucous membranes.  Head/Neck: Neck supple.  Cardiovascular: Normal rate and regular rhythm.   Respiratory/Thorax: Unlabored breathing.  Gastrointestinal: Abdomen slightly distended, soft, appropriately tender to palpation, no peritoneal signs, laparotomy sites c/d/i, well approximated with Dermabond, no erythema or drainage.  Genitourinary: Gutierrez catheter in place draining shital colored urine.  Musculoskeletal: ROM intact.  Neurological: A&Ox3, No focal deficits.  Psychological: Appropriate mood and behavior.  Skin: Warm and dry.    Radiology and labs reviewed. VSS, afebrile.    Plan:   - Diet: CLD  - Chewing gum  - IVF  - Continue current pain regimen   - PRN antiemetic   - DVT Proph: SCDs/ ambulate/ Lovenox  - Daily PPI  - Monitor and record gutierrez catheter output. Catheter removal at 0600 tomorrow morning.  - Monitor VS every 4 hours   - Labs ordered for AM   - IS every hour while awake   - Encourage ambulation / OOB as tolerated   - Daily weights  - Monitor lap sites for drainage, erythema, excessive bruising   - F/u with Dr. Walton outpatient once d/c from hospital     Dispo: Pain control. OOB as able. Gutierrez removal at 0600 tomorrow morning. Awaiting RBF.

## 2024-02-08 NOTE — DISCHARGE INSTRUCTIONS
Instructions After Laparoscopic Surgery    Wound Care:  - Ice packs to wounds every hour the first day  - Ok to shower 24 hours after surgery  - No baths or swimming for 2 weeks  - Keep wound clean and dry  - Do not apply topical creams or ointments  - Call if you notice redness around wound, foul-smelliing drainage, or increasing pain     Diet:          - GI soft, low residual    Activity          - Take it easy for the first 48 hours          - Stairs and walks are fine          - Resume activities gradually over the first week          - Ask Dr. Walton before resuming strenuous physical exertions          - No driving while on pain medication    Medications          - Pain medicine prescription attached for severe pain          - Please take Motrin/ Ibuprofen 600 mg every 6 hours scheduled for 2-3 days, then every 6 hours as needed          - Please take Tylenol 625 mg every 6 hours scheduled for 2-3 days, then every 6 hours as needed          - Resume your home medications unless otherwise directed          - Oxycodone 5mg every 6 hours as needed for pain    Other Instructions          - Call to make appointment within 1-2 days:, if one has not already been made for you.          - Call the doctor for the following:   Severe unrelieved pain   Fevers > 101F   Nausea/vomiting   Wound issues   Insurance/return to work forms   Shortness of breath   Chest pains    Other Instructions:  It is important to drink adequate fluid and avoid dehydration. Signs of dehydration include dark urine, decreased frequency in urine, pale mucous membrane, or dry mucous membranes. You should drink at least 8 8oz glasses of fluids a day and it should be a variety of fluids (water, juice, tea, coffee, etc.).  If you start to experience nausea, emesis, fever, or abdominal pain please call Dr. Walton's office.

## 2024-02-09 LAB
ANION GAP SERPL CALC-SCNC: 11 MMOL/L (ref 10–20)
BUN SERPL-MCNC: 12 MG/DL (ref 6–23)
CALCIUM SERPL-MCNC: 8.7 MG/DL (ref 8.6–10.3)
CHLORIDE SERPL-SCNC: 104 MMOL/L (ref 98–107)
CO2 SERPL-SCNC: 26 MMOL/L (ref 21–32)
CREAT SERPL-MCNC: 1.02 MG/DL (ref 0.5–1.3)
EGFRCR SERPLBLD CKD-EPI 2021: 86 ML/MIN/1.73M*2
ERYTHROCYTE [DISTWIDTH] IN BLOOD BY AUTOMATED COUNT: 14.9 % (ref 11.5–14.5)
GLUCOSE BLD MANUAL STRIP-MCNC: 109 MG/DL (ref 74–99)
GLUCOSE BLD MANUAL STRIP-MCNC: 121 MG/DL (ref 74–99)
GLUCOSE BLD MANUAL STRIP-MCNC: 129 MG/DL (ref 74–99)
GLUCOSE BLD MANUAL STRIP-MCNC: 131 MG/DL (ref 74–99)
GLUCOSE SERPL-MCNC: 110 MG/DL (ref 74–99)
HCT VFR BLD AUTO: 40.3 % (ref 41–52)
HGB BLD-MCNC: 12.6 G/DL (ref 13.5–17.5)
MCH RBC QN AUTO: 27 PG (ref 26–34)
MCHC RBC AUTO-ENTMCNC: 31.3 G/DL (ref 32–36)
MCV RBC AUTO: 87 FL (ref 80–100)
NRBC BLD-RTO: 0 /100 WBCS (ref 0–0)
PLATELET # BLD AUTO: 357 X10*3/UL (ref 150–450)
POTASSIUM SERPL-SCNC: 3.9 MMOL/L (ref 3.5–5.3)
RBC # BLD AUTO: 4.66 X10*6/UL (ref 4.5–5.9)
SODIUM SERPL-SCNC: 137 MMOL/L (ref 136–145)
WBC # BLD AUTO: 11.8 X10*3/UL (ref 4.4–11.3)

## 2024-02-09 PROCEDURE — C9113 INJ PANTOPRAZOLE SODIUM, VIA: HCPCS | Performed by: SURGERY

## 2024-02-09 PROCEDURE — 2500000001 HC RX 250 WO HCPCS SELF ADMINISTERED DRUGS (ALT 637 FOR MEDICARE OP): Performed by: SURGERY

## 2024-02-09 PROCEDURE — 80048 BASIC METABOLIC PNL TOTAL CA: CPT | Performed by: SURGERY

## 2024-02-09 PROCEDURE — 99232 SBSQ HOSP IP/OBS MODERATE 35: CPT | Performed by: NURSE PRACTITIONER

## 2024-02-09 PROCEDURE — 97116 GAIT TRAINING THERAPY: CPT | Mod: GP,CQ | Performed by: PHYSICAL THERAPY ASSISTANT

## 2024-02-09 PROCEDURE — 36415 COLL VENOUS BLD VENIPUNCTURE: CPT | Performed by: SURGERY

## 2024-02-09 PROCEDURE — 2500000004 HC RX 250 GENERAL PHARMACY W/ HCPCS (ALT 636 FOR OP/ED): Performed by: NURSE PRACTITIONER

## 2024-02-09 PROCEDURE — 2500000004 HC RX 250 GENERAL PHARMACY W/ HCPCS (ALT 636 FOR OP/ED): Performed by: SURGERY

## 2024-02-09 PROCEDURE — 1100000001 HC PRIVATE ROOM DAILY

## 2024-02-09 PROCEDURE — 97530 THERAPEUTIC ACTIVITIES: CPT | Mod: GO

## 2024-02-09 PROCEDURE — 94640 AIRWAY INHALATION TREATMENT: CPT

## 2024-02-09 PROCEDURE — 2500000001 HC RX 250 WO HCPCS SELF ADMINISTERED DRUGS (ALT 637 FOR MEDICARE OP): Performed by: NURSE PRACTITIONER

## 2024-02-09 PROCEDURE — 2500000002 HC RX 250 W HCPCS SELF ADMINISTERED DRUGS (ALT 637 FOR MEDICARE OP, ALT 636 FOR OP/ED)

## 2024-02-09 PROCEDURE — 82947 ASSAY GLUCOSE BLOOD QUANT: CPT

## 2024-02-09 PROCEDURE — 85027 COMPLETE CBC AUTOMATED: CPT | Performed by: SURGERY

## 2024-02-09 RX ADMIN — ACETAMINOPHEN 650 MG: 325 TABLET ORAL at 16:04

## 2024-02-09 RX ADMIN — GABAPENTIN 300 MG: 300 CAPSULE ORAL at 08:02

## 2024-02-09 RX ADMIN — ASPIRIN 81 MG: 81 TABLET, COATED ORAL at 08:02

## 2024-02-09 RX ADMIN — TRAZODONE HYDROCHLORIDE 50 MG: 50 TABLET ORAL at 21:27

## 2024-02-09 RX ADMIN — BUDESONIDE INHALATION 0.5 MG: 0.5 SUSPENSION RESPIRATORY (INHALATION) at 08:12

## 2024-02-09 RX ADMIN — PANTOPRAZOLE SODIUM 20 MG: 40 INJECTION, POWDER, FOR SOLUTION INTRAVENOUS at 08:02

## 2024-02-09 RX ADMIN — ATORVASTATIN CALCIUM 20 MG: 20 TABLET, FILM COATED ORAL at 21:27

## 2024-02-09 RX ADMIN — GABAPENTIN 300 MG: 300 CAPSULE ORAL at 21:27

## 2024-02-09 RX ADMIN — ENOXAPARIN SODIUM 40 MG: 40 INJECTION SUBCUTANEOUS at 16:00

## 2024-02-09 RX ADMIN — MONTELUKAST 10 MG: 10 TABLET, FILM COATED ORAL at 21:27

## 2024-02-09 RX ADMIN — FORMOTEROL FUMARATE DIHYDRATE 20 MCG: 20 SOLUTION RESPIRATORY (INHALATION) at 08:12

## 2024-02-09 RX ADMIN — GABAPENTIN 300 MG: 300 CAPSULE ORAL at 16:05

## 2024-02-09 RX ADMIN — LORATADINE 10 MG: 10 TABLET ORAL at 08:02

## 2024-02-09 ASSESSMENT — COGNITIVE AND FUNCTIONAL STATUS - GENERAL
WALKING IN HOSPITAL ROOM: A LITTLE
DAILY ACTIVITIY SCORE: 24
MOBILITY SCORE: 22
CLIMB 3 TO 5 STEPS WITH RAILING: A LITTLE
DAILY ACTIVITIY SCORE: 24
MOBILITY SCORE: 23
CLIMB 3 TO 5 STEPS WITH RAILING: A LITTLE

## 2024-02-09 ASSESSMENT — PAIN SCALES - GENERAL
PAINLEVEL_OUTOF10: 4
PAINLEVEL_OUTOF10: 3

## 2024-02-09 ASSESSMENT — PAIN - FUNCTIONAL ASSESSMENT
PAIN_FUNCTIONAL_ASSESSMENT: 0-10
PAIN_FUNCTIONAL_ASSESSMENT: 0-10

## 2024-02-09 NOTE — CARE PLAN
The patient's goals for the shift include      The clinical goals for the shift include        Problem: Pain - Adult  Goal: Verbalizes/displays adequate comfort level or baseline comfort level  Outcome: Progressing     Problem: Safety - Adult  Goal: Free from fall injury  Outcome: Progressing     Problem: Discharge Planning  Goal: Discharge to home or other facility with appropriate resources  Outcome: Progressing     Problem: Chronic Conditions and Co-morbidities  Goal: Patient's chronic conditions and co-morbidity symptoms are monitored and maintained or improved  Outcome: Progressing     Problem: Diabetes  Goal: Achieve decreasing blood glucose levels by end of shift  Outcome: Progressing  Goal: Increase stability of blood glucose readings by end of shift  Outcome: Progressing  Goal: Decrease in ketones present in urine by end of shift  Outcome: Progressing  Goal: Maintain electrolyte levels within acceptable range throughout shift  Outcome: Progressing  Goal: Maintain glucose levels >70mg/dl to <250mg/dl throughout shift  Outcome: Progressing  Goal: No changes in neurological exam by end of shift  Outcome: Progressing  Goal: Learn about and adhere to nutrition recommendations by end of shift  Outcome: Progressing  Goal: Vital signs within normal range for age by end of shift  Outcome: Progressing  Goal: Increase self care and/or family involovement by end of shift  Outcome: Progressing  Goal: Receive DSME education by end of shift  Outcome: Progressing     Problem: Pain  Goal: Takes deep breaths with improved pain control throughout the shift  Outcome: Progressing  Goal: Turns in bed with improved pain control throughout the shift  Outcome: Progressing  Goal: Walks with improved pain control throughout the shift  Outcome: Progressing  Goal: Performs ADL's with improved pain control throughout shift  Outcome: Progressing  Goal: Participates in PT with improved pain control throughout the shift  Outcome:  Progressing  Goal: Free from opioid side effects throughout the shift  Outcome: Progressing  Goal: Free from acute confusion related to pain meds throughout the shift  Outcome: Progressing

## 2024-02-09 NOTE — PROGRESS NOTES
Shaheen Ambriz is a 58 y.o. male on day 2 of admission presenting with Diverticulitis.    Subjective   NAEO. Reports feeling better this morning, is passing gas and tolerating CLD. Walked this morning and feels worn out. Denies fever, chills, CP, SOB and N/V.        Objective     Physical Exam  Constitutional:       Appearance: Normal appearance.   Eyes:      Conjunctiva/sclera: Conjunctivae normal.   Cardiovascular:      Rate and Rhythm: Normal rate and regular rhythm.   Pulmonary:      Effort: Pulmonary effort is normal.   Abdominal:      Comments: Abdomen is mildly distended, softer today, appropriately tender, Lap sites with Dermabond, C/D/I, edges well approximated, minor bruising without drainage or hematoma.     Genitourinary:     Comments: Voiding without difficulty    Skin:     General: Skin is warm and dry.   Neurological:      Mental Status: He is oriented to person, place, and time.   Psychiatric:         Mood and Affect: Mood normal.         Behavior: Behavior normal.         Last Recorded Vitals  Blood pressure 128/82, pulse 95, temperature 36.6 °C (97.9 °F), temperature source Temporal, resp. rate 18, height 1.829 m (6'), weight 117 kg (257 lb 11.5 oz), SpO2 97 %.  Intake/Output last 3 Shifts:  I/O last 3 completed shifts:  In: 978.7 (8.4 mL/kg) [I.V.:978.7 (8.4 mL/kg)]  Out: 2525 (21.6 mL/kg) [Urine:2525 (0.6 mL/kg/hr)]  Weight: 116.9 kg     Medications:  Scheduled medications  aspirin, 81 mg, oral, Daily  atorvastatin, 20 mg, oral, Nightly  budesonide, 0.5 mg, nebulization, BID  enoxaparin, 40 mg, subcutaneous, q24h  FLUoxetine, 40 mg, oral, Daily  formoterol, 20 mcg, nebulization, BID  gabapentin, 300 mg, oral, TID  glimepiride, 2 mg, oral, Daily with evening meal  loratadine, 10 mg, oral, Daily  montelukast, 10 mg, oral, q PM  pantoprazole, 20 mg, intravenous, q24h GREGORY  traZODone, 50 mg, oral, Nightly      Continuous medications  sodium chloride 0.9%, 40 mL/hr, Last Rate: 40 mL/hr (02/08/24  6965)      PRN medications  PRN medications: acetaminophen, albuterol, HYDROmorphone, melatonin, naloxone, oxyCODONE, oxyCODONE, oxygen, promethazine **OR** promethazine    Relevant Results  Results for orders placed or performed during the hospital encounter of 02/07/24 (from the past 24 hour(s))   POCT GLUCOSE   Result Value Ref Range    POCT Glucose 96 74 - 99 mg/dL   POCT GLUCOSE   Result Value Ref Range    POCT Glucose 140 (H) 74 - 99 mg/dL   Basic Metabolic Panel   Result Value Ref Range    Glucose 110 (H) 74 - 99 mg/dL    Sodium 137 136 - 145 mmol/L    Potassium 3.9 3.5 - 5.3 mmol/L    Chloride 104 98 - 107 mmol/L    Bicarbonate 26 21 - 32 mmol/L    Anion Gap 11 10 - 20 mmol/L    Urea Nitrogen 12 6 - 23 mg/dL    Creatinine 1.02 0.50 - 1.30 mg/dL    eGFR 86 >60 mL/min/1.73m*2    Calcium 8.7 8.6 - 10.3 mg/dL   CBC   Result Value Ref Range    WBC 11.8 (H) 4.4 - 11.3 x10*3/uL    nRBC 0.0 0.0 - 0.0 /100 WBCs    RBC 4.66 4.50 - 5.90 x10*6/uL    Hemoglobin 12.6 (L) 13.5 - 17.5 g/dL    Hematocrit 40.3 (L) 41.0 - 52.0 %    MCV 87 80 - 100 fL    MCH 27.0 26.0 - 34.0 pg    MCHC 31.3 (L) 32.0 - 36.0 g/dL    RDW 14.9 (H) 11.5 - 14.5 %    Platelets 357 150 - 450 x10*3/uL   POCT GLUCOSE   Result Value Ref Range    POCT Glucose 109 (H) 74 - 99 mg/dL   POCT GLUCOSE   Result Value Ref Range    POCT Glucose 131 (H) 74 - 99 mg/dL     ECG 12 lead (Clinic Performed)    Result Date: 1/26/2024  Normal sinus rhythm Normal ECG When compared with ECG of 31-AUG-2022 08:07, No significant change was found Confirmed by Rajesh Jo ([id number]) on 1/26/2024 10:49:30 AM       Assessment/Plan   Principal Problem:    Diverticulitis  Active Problems:    Obesity    Factor V Leiden (CMS/HCC)    Shaheen Ambriz is a 59 yo male who is admitted after robotic assisted lx sigmoid resection, intra-operative findings showed thickened sigmoid colon with small bowel adhesions with chronic diverticulitis.     Neuro:  Hx: anxiety  Acute post-op pain- well  controlled with oxycodone  - OOB/ ambulate 5x per day   - continue prozac       CV: VSS  Hx: HLD, HTN  - VS every 4 hours       Pulm: NAD, on RA, lungs CTAB  Hx: asthma  - IS every hour while awake   - Pulse ox every 4 hours with VS   - continue formeterol, budesonide, and singulair    GI: abdomen soft, appropriately tender, moderately distended, Lap sites with Dermabond, C/D/I, edges well approximated, minor bruising without drainage or hematoma.  Hx: diverticulosis, GERD  POD #2 s/p lx sigmoid resection   - advanced to Fulls  - ensure surgery when tolerating diet  - PRN antiemetic   - dietician following   - chewing gum      : voiding without difficulty   Hx: nephrolithiasis  - Monitor I&Os     HEME: DVT Proph   Hx: factor V Leiden  - SCDs/ ambulate/ lovenox  - H/H stable  - no s/s of bleeding       Endo:   Hx: DM      ID: afebrile  - Monitor for s/s infection  - WBC 11.8    Dispo: oob and walking as much as possible, advanced to Fulls today, awaiting RBF. Discussed with Dr Walton.     I spent 30 minutes in the professional and overall care of this patient.      Kamla Reid, APRN-CNP  Minimal pain, tawanda fulls, +BM  Possible dc tomorrow

## 2024-02-09 NOTE — PROGRESS NOTES
Physical Therapy    Physical Therapy Treatment    Patient Name: Shaheen Ambriz  MRN: [id number]  Today's Date: 2/9/2024  Time Calculation  Start Time: 1050  Stop Time: 1114  Time Calculation (min): 24 min       Assessment/Plan   PT Assessment  End of Session Communication: Bedside nurse  End of Session Patient Position: Up in chair, Alarm off, not on at start of session  PT Plan  Inpatient/Swing Bed or Outpatient: Inpatient  PT Plan  Treatment/Interventions: Bed mobility, Transfer training, Gait training, Stair training  PT Plan: Skilled PT  PT Frequency: 4 times per week  PT Discharge Recommendations: Low intensity level of continued care  PT - OK to Discharge: Yes (per PT POC)      General Visit Information:   PT  Visit  PT Received On: 02/09/24  General  Reason for Referral: Pt is a 59 yo male presenting to [facility] s/p robotic assisted lap colectomy, sigmoid colon resection and partial anastomosis. Primary dx of diverticulitis  Patient Position Received: Bed, 3 rail up, Alarm off, not on at start of session  Preferred Learning Style: auditory, kinesthetic, verbal  General Comment:  (pt agreeable to tx.)    Subjective   Precautions:     Vital Signs:       Objective   Pain:  Pain Assessment  Pain Assessment: 0-10  Pain Score: 3  Cognition:     Postural Control:     Extremity/Trunk Assessments:    Activity Tolerance:     Treatments:            Bed Mobility  Bed Mobility: Yes  Bed Mobility 1  Bed Mobility 1: Supine to sitting, Log roll  Level of Assistance 1: Close supervision  Bed Mobility Comments 1:  (pt req min cues to complete proper sequencing with log roll)    Ambulation/Gait Training  Ambulation/Gait Training Performed: Yes  Ambulation/Gait Training 1  Surface 1: Level tile  Device 1: No device  Assistance 1: Close supervision  Quality of Gait 1: Decreased step length, Forward flexed posture, Inconsistent stride length  Comments/Distance (ft) 1: 215orn2 (pt demo steady step through pattern, good endurance.no  LOB)  Transfers  Transfer: Yes  Transfer 1  Transfer From 1: Sit to, Stand to  Transfer Level of Assistance 1: Independent  Trials/Comments 1:  (pt demo proper hadn placement)    Stairs  Stairs:  (pt performed 4 steps with 1 rail and cues for proper sequencing.)    Outcome Measures:  [facility] Basic Mobility  Turning from your back to your side while in a flat bed without using bedrails: None  Moving from lying on your back to sitting on the side of a flat bed without using bedrails: None  Moving to and from bed to chair (including a wheelchair): None  Standing up from a chair using your arms (e.g. wheelchair or bedside chair): None  To walk in hospital room: A little  Climbing 3-5 steps with railing: A little  Basic Mobility - Total Score: 22    Education Documentation  Handouts, taught by Alo Hair PTA at 2/9/2024 11:29 AM.  Learner: Patient  Readiness: Acceptance  Method: Explanation  Response: Verbalizes Understanding    Precautions, taught by Alo Hair PTA at 2/9/2024 11:29 AM.  Learner: Patient  Readiness: Acceptance  Method: Explanation  Response: Verbalizes Understanding    Body Mechanics, taught by Alo Hair PTA at 2/9/2024 11:29 AM.  Learner: Patient  Readiness: Acceptance  Method: Explanation  Response: Verbalizes Understanding    Home Exercise Program, taught by Alo Hair PTA at 2/9/2024 11:29 AM.  Learner: Patient  Readiness: Acceptance  Method: Explanation  Response: Verbalizes Understanding    Mobility Training, taught by Alo Hair PTA at 2/9/2024 11:29 AM.  Learner: Patient  Readiness: Acceptance  Method: Explanation  Response: Verbalizes Understanding    Education Comments  No comments found.        OP EDUCATION:       Encounter Problems       Encounter Problems (Active)       Balance       LTG - Patient will tolerate standing (Progressing)       Start:  02/08/24    Expected End:  02/22/24       >5 min during dynamic balance with SUP (no UE  support)            Mobility       LTG - Patient will navigate steps  (Progressing)       Start:  02/08/24    Expected End:  02/22/24       1) x4 steps with no HR (Mod Ind)  2) x12 steps with R HR (mod Ind)         STG - Patient will ambulate (Progressing)       Start:  02/08/24    Expected End:  02/22/24       >200ft with no device Mod Ind            Pain - Adult          Transfers       STG - Patient will perform bed mobility (Progressing)       Start:  02/08/24    Expected End:  02/22/24       Mod ind via log roll         STG - Patient will transfer sit to and from stand (Progressing)       Start:  02/08/24    Expected End:  02/22/24       Mod Ind

## 2024-02-09 NOTE — PROGRESS NOTES
Occupational Therapy    Occupational Therapy Treatment    Name: Shaheen Ambriz  MRN: [id number]  : 1965  Date: 24  Time Calculation  Start Time: 1630  Stop Time: 1640  Time Calculation (min): 10 min    Assessment:  OT Assessment: all acute OT goals met, no additional OT needs are indicated  Prognosis: Excellent  End of Session Communication:  (up ambulating hallways, ok'd by RN)  Plan:  Treatment Interventions: ADL retraining, Functional transfer training  OT Frequency: 3 times per week  OT Discharge Recommendations: No further acute OT  OT Recommended Transfer Status: Stand by assist  OT - OK to Discharge: Yes    Subjective   Previous Visit Info:  OT Last Visit  OT Received On: 24  General:  General  Patient Position Received: Bed, 3 rail up, Alarm off, not on at start of session    Pain Assessment:  Pain Assessment  Pain Assessment: 0-10  Pain Score: 4  Pain Type: Surgical pain  Pain Location: Abdomen     Objective   Activities of Daily Living:    LE Dressing  LE Dressing: Yes  Sock Level of Assistance: Modified independent  LE Dressing Where Assessed: Edge of bed  LE Dressing Comments: no deficits with LB ADL management, able to perform figure four technique with ease    Toileting  Toileting Level of Assistance: Modified independent  Where Assessed:  (simulated ability level)       Bed Mobility/Transfers: Bed Mobility 1  Bed Mobility 1: Supine to sitting  Level of Assistance 1: Modified independent  Bed Mobility Comments 1: excellent log roll transfer    Transfers  Transfer: Yes  Transfer 1  Technique 1: Sit to stand  Transfer Level of Assistance 1: Independent    Ambulation/Gait Training:  Ambulation/Gait Training  Ambulation/Gait Training Performed:  (ambulated in hallway >100 ft without AD, no  balance or safety deficits observed)  Sitting Balance:  Static Sitting Balance  Static Sitting-Level of Assistance: Independent  Dynamic Sitting Balance  Dynamic Sitting-Balance:   (independent)  Standing Balance:  Static Standing Balance  Static Standing-Level of Assistance:  (independent)  Dynamic Standing Balance  Dynamic Standing-Balance:  (independent)    Outcome Measures:  [facility] Daily Activity  Putting on and taking off regular lower body clothing: None  Bathing (including washing, rinsing, drying): None  Putting on and taking off regular upper body clothing: None  Toileting, which includes using toilet, bedpan or urinal: None  Taking care of personal grooming such as brushing teeth: None  Eating Meals: None  Daily Activity - Total Score: 24      Education Documentation  Body Mechanics, taught by Gómez Smyth OT at 2/9/2024  4:50 PM.  Learner: Patient  Readiness: Eager  Method: Explanation  Response: Verbalizes Understanding    Precautions, taught by Gómez Smyth OT at 2/9/2024  4:50 PM.  Learner: Patient  Readiness: Eager  Method: Explanation  Response: Verbalizes Understanding    ADL Training, taught by Gómez Smyth OT at 2/9/2024  4:50 PM.  Learner: Patient  Readiness: Eager  Method: Explanation  Response: Verbalizes Understanding    Education Comments  No comments found.      Goals:  Encounter Problems       Encounter Problems (Active)          Encounter Problems (Resolved)       ADLs       Patient with complete lower body dressing with independent level of assistance donning and doffing all LE clothes  with no adaptive equipment while edge of bed  (Met)       Start:  02/08/24    Expected End:  02/22/24    Resolved:  02/09/24         Patient will complete daily grooming tasks brushing teeth, shaving, and washing face/hair with independent level of assistance and PRN adaptive equipment while standing. (Met)       Start:  02/08/24    Expected End:  02/22/24    Resolved:  02/09/24         Patient will complete toileting including hygiene clothing management/hygiene with modified independent level of assistance and raised toilet seat and grab bars. (Met)       Start:   02/08/24    Expected End:  02/22/24    Resolved:  02/09/24            TRANSFERS       Patient will perform bed mobility modified independent level of assistance and bed rails in order to improve safety and independence with mobility (Met)       Start:  02/08/24    Expected End:  02/22/24    Resolved:  02/09/24         Patient will complete sit to stand transfer with modified independent level of assistance and least restrictive device in order to improve safety and prepare for out of bed mobility. (Met)       Start:  02/08/24    Expected End:  02/22/24    Resolved:  02/09/24

## 2024-02-10 VITALS
DIASTOLIC BLOOD PRESSURE: 79 MMHG | HEART RATE: 78 BPM | TEMPERATURE: 97.8 F | WEIGHT: 254.63 LBS | SYSTOLIC BLOOD PRESSURE: 132 MMHG | HEIGHT: 72 IN | RESPIRATION RATE: 17 BRPM | OXYGEN SATURATION: 97 % | BODY MASS INDEX: 34.49 KG/M2

## 2024-02-10 PROBLEM — K57.92 DIVERTICULITIS: Status: RESOLVED | Noted: 2023-08-18 | Resolved: 2024-02-10

## 2024-02-10 LAB
ANION GAP SERPL CALC-SCNC: 11 MMOL/L (ref 10–20)
BUN SERPL-MCNC: 12 MG/DL (ref 6–23)
CALCIUM SERPL-MCNC: 9.1 MG/DL (ref 8.6–10.3)
CHLORIDE SERPL-SCNC: 103 MMOL/L (ref 98–107)
CO2 SERPL-SCNC: 28 MMOL/L (ref 21–32)
CREAT SERPL-MCNC: 1 MG/DL (ref 0.5–1.3)
EGFRCR SERPLBLD CKD-EPI 2021: 88 ML/MIN/1.73M*2
ERYTHROCYTE [DISTWIDTH] IN BLOOD BY AUTOMATED COUNT: 14.6 % (ref 11.5–14.5)
GLUCOSE BLD MANUAL STRIP-MCNC: 110 MG/DL (ref 74–99)
GLUCOSE BLD MANUAL STRIP-MCNC: 129 MG/DL (ref 74–99)
GLUCOSE SERPL-MCNC: 110 MG/DL (ref 74–99)
HCT VFR BLD AUTO: 43 % (ref 41–52)
HGB BLD-MCNC: 12.9 G/DL (ref 13.5–17.5)
MCH RBC QN AUTO: 26.2 PG (ref 26–34)
MCHC RBC AUTO-ENTMCNC: 30 G/DL (ref 32–36)
MCV RBC AUTO: 87 FL (ref 80–100)
NRBC BLD-RTO: 0 /100 WBCS (ref 0–0)
PLATELET # BLD AUTO: 379 X10*3/UL (ref 150–450)
POTASSIUM SERPL-SCNC: 4 MMOL/L (ref 3.5–5.3)
RBC # BLD AUTO: 4.92 X10*6/UL (ref 4.5–5.9)
SODIUM SERPL-SCNC: 138 MMOL/L (ref 136–145)
WBC # BLD AUTO: 11.7 X10*3/UL (ref 4.4–11.3)

## 2024-02-10 PROCEDURE — 36415 COLL VENOUS BLD VENIPUNCTURE: CPT | Performed by: SURGERY

## 2024-02-10 PROCEDURE — 82947 ASSAY GLUCOSE BLOOD QUANT: CPT

## 2024-02-10 PROCEDURE — 2500000001 HC RX 250 WO HCPCS SELF ADMINISTERED DRUGS (ALT 637 FOR MEDICARE OP): Performed by: SURGERY

## 2024-02-10 PROCEDURE — 99231 SBSQ HOSP IP/OBS SF/LOW 25: CPT

## 2024-02-10 PROCEDURE — 2500000004 HC RX 250 GENERAL PHARMACY W/ HCPCS (ALT 636 FOR OP/ED): Performed by: NURSE PRACTITIONER

## 2024-02-10 PROCEDURE — 80048 BASIC METABOLIC PNL TOTAL CA: CPT | Performed by: SURGERY

## 2024-02-10 PROCEDURE — 2500000004 HC RX 250 GENERAL PHARMACY W/ HCPCS (ALT 636 FOR OP/ED): Performed by: SURGERY

## 2024-02-10 PROCEDURE — 2500000001 HC RX 250 WO HCPCS SELF ADMINISTERED DRUGS (ALT 637 FOR MEDICARE OP): Performed by: NURSE PRACTITIONER

## 2024-02-10 PROCEDURE — 85027 COMPLETE CBC AUTOMATED: CPT | Performed by: SURGERY

## 2024-02-10 PROCEDURE — C9113 INJ PANTOPRAZOLE SODIUM, VIA: HCPCS | Performed by: SURGERY

## 2024-02-10 RX ORDER — IBUPROFEN 600 MG/1
600 TABLET ORAL EVERY 6 HOURS PRN
Qty: 54 TABLET | Refills: 0 | Status: SHIPPED | OUTPATIENT
Start: 2024-02-10 | End: 2024-02-24

## 2024-02-10 RX ORDER — OXYCODONE HYDROCHLORIDE 5 MG/1
5 TABLET ORAL EVERY 4 HOURS PRN
Qty: 20 TABLET | Refills: 0 | Status: SHIPPED | OUTPATIENT
Start: 2024-02-10 | End: 2024-02-17

## 2024-02-10 RX ORDER — ACETAMINOPHEN 325 MG/1
650 TABLET ORAL EVERY 6 HOURS
Qty: 112 TABLET | Refills: 0 | Status: SHIPPED | OUTPATIENT
Start: 2024-02-10 | End: 2024-02-24

## 2024-02-10 RX ADMIN — GABAPENTIN 300 MG: 300 CAPSULE ORAL at 08:37

## 2024-02-10 RX ADMIN — ASPIRIN 81 MG: 81 TABLET, COATED ORAL at 08:37

## 2024-02-10 RX ADMIN — FLUOXETINE 40 MG: 20 CAPSULE ORAL at 08:37

## 2024-02-10 RX ADMIN — LORATADINE 10 MG: 10 TABLET ORAL at 08:37

## 2024-02-10 RX ADMIN — PANTOPRAZOLE SODIUM 20 MG: 40 INJECTION, POWDER, FOR SOLUTION INTRAVENOUS at 10:10

## 2024-02-10 ASSESSMENT — COGNITIVE AND FUNCTIONAL STATUS - GENERAL: DAILY ACTIVITIY SCORE: 24

## 2024-02-10 ASSESSMENT — PAIN SCALES - WONG BAKER: WONGBAKER_NUMERICALRESPONSE: NO HURT

## 2024-02-10 ASSESSMENT — PAIN SCALES - GENERAL: PAINLEVEL_OUTOF10: 0 - NO PAIN

## 2024-02-10 NOTE — CARE PLAN
The patient's goals for the shift include      The clinical goals for the shift include increase mobility, ambulation this shift      Problem: Pain - Adult  Goal: Verbalizes/displays adequate comfort level or baseline comfort level  2/10/2024 1109 by Izabella Pulliam RN  Outcome: Adequate for Discharge  2/10/2024 0930 by Izabella Pulliam RN  Outcome: Progressing     Problem: Safety - Adult  Goal: Free from fall injury  2/10/2024 1109 by Izabella Pulliam RN  Outcome: Adequate for Discharge  2/10/2024 0930 by Izabella Pulliam RN  Outcome: Progressing     Problem: Discharge Planning  Goal: Discharge to home or other facility with appropriate resources  2/10/2024 1109 by Izabella Pulliam RN  Outcome: Adequate for Discharge  2/10/2024 0930 by Izabella Pulliam RN  Outcome: Progressing     Problem: Chronic Conditions and Co-morbidities  Goal: Patient's chronic conditions and co-morbidity symptoms are monitored and maintained or improved  2/10/2024 1109 by Izabella Pulliam RN  Outcome: Adequate for Discharge  2/10/2024 0930 by Izabella Pulliam RN  Outcome: Progressing     Problem: Diabetes  Goal: Achieve decreasing blood glucose levels by end of shift  2/10/2024 1109 by Izabella Pulliam RN  Outcome: Adequate for Discharge  2/10/2024 0930 by Izabella Pulliam RN  Outcome: Progressing  Goal: Increase stability of blood glucose readings by end of shift  2/10/2024 1109 by Izabella Pulliam RN  Outcome: Adequate for Discharge  2/10/2024 0930 by Izabella Pulliam RN  Outcome: Progressing  Goal: Decrease in ketones present in urine by end of shift  2/10/2024 1109 by Izabella Pulliam RN  Outcome: Adequate for Discharge  2/10/2024 0930 by Izabella Pulliam RN  Outcome: Progressing  Goal: Maintain electrolyte levels within acceptable range throughout  shift  2/10/2024 1109 by Izabella Pulliam RN  Outcome: Adequate for Discharge  2/10/2024 0930 by Izabella Pulliam RN  Outcome: Progressing  Goal: Maintain glucose levels >70mg/dl to <250mg/dl throughout shift  2/10/2024 1109 by Izabella Pulliam RN  Outcome: Adequate for Discharge  2/10/2024 0930 by Izabella Pulliam RN  Outcome: Progressing  Goal: No changes in neurological exam by end of shift  2/10/2024 1109 by Izabella Pulliam RN  Outcome: Adequate for Discharge  2/10/2024 0930 by Izabella Pulliam RN  Outcome: Progressing  Goal: Learn about and adhere to nutrition recommendations by end of shift  2/10/2024 1109 by Izabella Pulliam RN  Outcome: Adequate for Discharge  2/10/2024 0930 by Izabella Pulliam RN  Outcome: Progressing  Goal: Vital signs within normal range for age by end of shift  2/10/2024 1109 by Izabella Pulliam RN  Outcome: Adequate for Discharge  2/10/2024 0930 by Izabella Pulliam RN  Outcome: Progressing  Goal: Increase self care and/or family involovement by end of shift  2/10/2024 1109 by Izabella Pulliam RN  Outcome: Adequate for Discharge  2/10/2024 0930 by Izabella Pulliam RN  Outcome: Progressing  Goal: Receive DSME education by end of shift  2/10/2024 1109 by Izabella Pulliam RN  Outcome: Adequate for Discharge  2/10/2024 0930 by Izabella Pulliam RN  Outcome: Progressing     Problem: Pain  Goal: Takes deep breaths with improved pain control throughout the shift  2/10/2024 1109 by Izabella Pulliam RN  Outcome: Adequate for Discharge  2/10/2024 0930 by Izabella Pulliam RN  Outcome: Progressing  Goal: Turns in bed with improved pain control throughout the shift  2/10/2024 1109 by Izabella Pulliam RN  Outcome: Adequate for Discharge  2/10/2024 0930 by Izabella Pulliam,  RN  Outcome: Progressing  Goal: Walks with improved pain control throughout the shift  2/10/2024 1109 by Izabella Pulliam RN  Outcome: Adequate for Discharge  2/10/2024 0930 by Izabella Pulliam RN  Outcome: Progressing  Goal: Performs ADL's with improved pain control throughout shift  2/10/2024 1109 by Izabella Pulliam RN  Outcome: Adequate for Discharge  2/10/2024 0930 by Izabella Pulliam RN  Outcome: Progressing  Goal: Participates in PT with improved pain control throughout the shift  2/10/2024 1109 by Izabella Pulliam RN  Outcome: Adequate for Discharge  2/10/2024 0930 by Izabella Pulliam RN  Outcome: Progressing  Goal: Free from opioid side effects throughout the shift  2/10/2024 1109 by Izabella Pulliam RN  Outcome: Adequate for Discharge  2/10/2024 0930 by Izabella Pulliam RN  Outcome: Progressing  Goal: Free from acute confusion related to pain meds throughout the shift  2/10/2024 1109 by Izabella Pulliam RN  Outcome: Adequate for Discharge  2/10/2024 0930 by Izabella Pulliam RN  Outcome: Progressing     Problem: Balance  Goal: LTG - Patient will tolerate standing  Outcome: Adequate for Discharge     Problem: Mobility  Goal: LTG - Patient will navigate steps   Outcome: Adequate for Discharge  Goal: STG - Patient will ambulate  Outcome: Adequate for Discharge     Problem: Transfers  Goal: STG - Patient will perform bed mobility  Outcome: Adequate for Discharge  Goal: STG - Patient will transfer sit to and from stand  Outcome: Adequate for Discharge

## 2024-02-10 NOTE — CARE PLAN
The patient's goals for the shift include      The clinical goals for the shift include increase mobility, ambulation this shift      Problem: Pain - Adult  Goal: Verbalizes/displays adequate comfort level or baseline comfort level  Outcome: Progressing     Problem: Safety - Adult  Goal: Free from fall injury  Outcome: Progressing     Problem: Discharge Planning  Goal: Discharge to home or other facility with appropriate resources  Outcome: Progressing     Problem: Chronic Conditions and Co-morbidities  Goal: Patient's chronic conditions and co-morbidity symptoms are monitored and maintained or improved  Outcome: Progressing     Problem: Diabetes  Goal: Achieve decreasing blood glucose levels by end of shift  Outcome: Progressing  Goal: Increase stability of blood glucose readings by end of shift  Outcome: Progressing  Goal: Decrease in ketones present in urine by end of shift  Outcome: Progressing  Goal: Maintain electrolyte levels within acceptable range throughout shift  Outcome: Progressing  Goal: Maintain glucose levels >70mg/dl to <250mg/dl throughout shift  Outcome: Progressing  Goal: No changes in neurological exam by end of shift  Outcome: Progressing  Goal: Learn about and adhere to nutrition recommendations by end of shift  Outcome: Progressing  Goal: Vital signs within normal range for age by end of shift  Outcome: Progressing  Goal: Increase self care and/or family involovement by end of shift  Outcome: Progressing  Goal: Receive DSME education by end of shift  Outcome: Progressing     Problem: Pain  Goal: Takes deep breaths with improved pain control throughout the shift  Outcome: Progressing  Goal: Turns in bed with improved pain control throughout the shift  Outcome: Progressing  Goal: Walks with improved pain control throughout the shift  Outcome: Progressing  Goal: Performs ADL's with improved pain control throughout shift  Outcome: Progressing  Goal: Participates in PT with improved pain control  throughout the shift  Outcome: Progressing  Goal: Free from opioid side effects throughout the shift  Outcome: Progressing  Goal: Free from acute confusion related to pain meds throughout the shift  Outcome: Progressing

## 2024-02-10 NOTE — CARE PLAN
The patient's goals for the shift include      The clinical goals for the shift include increase mobility, ambulation this shift

## 2024-02-10 NOTE — NURSING NOTE
Patient discharged to home a&ox4 up ad ovidio in stable condition patient in agreement with discharge plan wife at bedside to transport home in personal vehicle discharge summary/instructions provided including medications, follow-up, surgical site care, diet, when to seek medical attention patient verbalizes good understanding discharged with all personal belongings transported off of unit via wheelchair no s/s distress noted at time of discharge

## 2024-02-12 LAB
LABORATORY COMMENT REPORT: NORMAL
PATH REPORT.FINAL DX SPEC: NORMAL
PATH REPORT.GROSS SPEC: NORMAL
PATH REPORT.RELEVANT HX SPEC: NORMAL
PATH REPORT.TOTAL CANCER: NORMAL

## 2024-02-13 ENCOUNTER — TELEPHONE (OUTPATIENT)
Dept: SURGERY | Facility: CLINIC | Age: 59
End: 2024-02-13
Payer: COMMERCIAL

## 2024-02-13 NOTE — TELEPHONE ENCOUNTER
Post op call.  He is s/p 2/07/2024 LX Sigmoid Colectomy.    He is doing well at home.  He rates his pain 2 or 3 out of ten with ten being the worst pain.  He is taking Tylenol and Ibuprofen for his discomfort.  Is not using Oxycodone.  He is passing gas.    Last bowel motion was on Sunday, Feb 11th.  Has been passing some mucous per anus.  He not bloated, just gassy.  Appetite is returning. Was very hungry this am and ate eggs, sausage, hash browns and pancakes.  Denies n/v.  Denies fever/chills.  Incision is clean and dry.  We discussed that he does not have any worrisome symptoms right now.  Has not been eating normal portions and has no dietary fiber right now.  He can take Miralax one capful today to kick start the bowels.   If he develops any worrisome symptoms he will call right away.  Otherwise he will call with an update on how he is doing later this week.  Odalys Rodrigues RN

## 2024-02-20 ENCOUNTER — TELEPHONE (OUTPATIENT)
Dept: SURGERY | Facility: CLINIC | Age: 59
End: 2024-02-20
Payer: COMMERCIAL

## 2024-02-20 NOTE — TELEPHONE ENCOUNTER
Post op call. He is s/p 2/07/2024 LX Sigmoid Colectomy.  He reports that is is struggling with his bowels.  He has been taking Miralax daily in fear that he will become constipated.  Now having about 6-10 movements per day and the stools are like paste.  Sometimes will get urge to poop and only mucous will come out.  His appetite is good.  Denies bloating, nausea, vomiting.  Denies fever/chills.  Incision is clean and dry.  We discussed that he should stop the Miralax.  This is a laxative making you go more.  He can start Metamucil 1 tablespoon per day.  This will help make your stools soft and formed and become more regular.  He can reintroduce fiber rich foods when he is four weeks post op.  He was invited to call if he continues to have bowel issues.

## 2024-02-20 NOTE — DOCUMENTATION CLARIFICATION NOTE
"    PATIENT:               LUC GOODRICH  ACCT #:                  [id number]  MRN:                       [id number]  :                       1965  ADMIT DATE:       2024 5:40 AM  DISCH DATE:        2/10/2024 1:38 PM  RESPONDING PROVIDER #:        [id number]          PROVIDER RESPONSE TEXT:    Small serosal tear at rectal staple line was inherent to the procedure, not clinically significant    CDI QUERY TEXT:    [facility]_Diagnosis Ruled Out In    Instruction:    Based on your assessment of the patient and the clinical information, please provide the requested documentation by clicking on the appropriate radio button and enter any additional information if prompted.    Question: Please further clarify the diagnosis of small serosal tear at rectal staple line as      When answering this query, please exercise your independent professional judgment. The fact that a question is being asked, does not imply that any particular answer is desired or expected.    The patient's clinical indicators include:  Clinical Information:  57 y/o male presents to [facility] with DX Diverticulitis with multiple flare ups. Presents for surgical intervention. Discharge to home 02/10.    Clinical Indicators:  Op Note per TAVO Walton MD 24: \"The EEA handle was then placed transanally and manipulated up to the rectal staple line.  During this there was a small amount of tearing visualized just anterior to the rectal staple line.  Flex sig and leak test were performed and negative of the rectal stump.  EEA handle was reinserted and the spike was brought out through the small serosal tear just anterior to the staple line.\"    Per Gen Surg note  ?Reports feeling better this morning, is passing gas and tolerating CLD.?    Treatment: OR  - Robot assisted LAP Sigmoid Colon Resection  Risk Factors: PMH: Asthma, Anxiety, DM 2, Factor V Leiden, HTN, HLD  Options provided:  -- Small serosal tear at rectal staple line was inherent to the " procedure, not clinically significant  -- Small serosal tear at rectal staple line ruled in as a clinically significant finding  -- Other - I will add my own diagnosis  -- Refer to Clinical Documentation Reviewer    Query created by: Dona Archer on 2/16/2024 10:10 AM      Electronically signed by:  PRESTON MENDEZ MD 2/20/2024 9:12 AM

## 2024-02-29 NOTE — PROGRESS NOTES
Shaheen Ambriz is a 58 year old male with history of recurrent uncomplicated diverticulitis causing significant quality-of-life issues.  He was brought to the operating room on February 7, 2024.    1/26/2023 COLONOSCOPY TO CECUM  The perianal and digital rectal exams were normal.  A 4 mm polyp was found in the transverse colon. The polyp was sessile.  The polyp was removed with a cold snare.  Resection and retrieval were complete.  A diminutive polyp was found in the ascending colon. The polyp was removed with a cold biopsy. Resection and retrieval were complete.    Multiple medium mouthed diverticula were found in the sigmoid colon and descending colon.  Non-bleeding internal hemorrhoids were found during retroflexion.  The hemorrhoids were small.  FINAL DIAGNOSIS   A.  COLON, TRANSVERSE, POLYP, COLD SNARE, POLYPECTOMY:    - TUBULAR ADENOMA INVOLVING MULTIPLE FRAGMENTS     B.  COLON, ASCENDING, POLYP, COLD FORCEP, POLYPECTOMY:    - TUBULAR ADENOMA         2/07/2024 Operation:  Robot Assisted Laparoscopic Sigmoid Colon Resection   Pathology  A. Colon, sigmoid, resection:  Segment of colon with diverticular disease.  Four (4) lymph nodes with no significant histopathologic abnormality.     Patient is very happy with surgery outcomes.  Started on Metamucil one tablespoon daily and has made his bowel habits regular.  No issues with urgency or incontinence.  Incisions healed.  Able to eat and drink without n/v.  Denies fever chills.      Visit Vitals  /75   Pulse 95   Temp 36.1 °C (97 °F)   Wt 115 kg (254 lb)   SpO2 97%   BMI 34.45 kg/m²   Smoking Status Never   BSA 2.42 m²         Review of Systems  Constitutional: Negative for fever, chills, anorexia, weight loss, malaise     ENMT: Negative for nasal discharge, congestion, ear pain, mouth pain, throat pain     Respiratory: Negative for cough, hemoptysis, wheezing, shortness of breath     Cardiac: Negative for chest pain, dyspnea on exertion, orthopnea,  palpitations, syncope     Gastrointestinal: Negative for nausea, vomiting, diarrhea, constipation, abdominal pain,     Genitourinary: Negative for discharge, dysuria, flank pain, frequency, hematuria     Musculoskeletal: Negative for decreased ROM, pain, swelling, weakness     Neurological: Negative for dizziness, confusion, headache, seizures, syncope     Psychiatric: Negative for mood changes, anxiety, hallucinations, sleep changes, suicidal ideas     Skin: Negative for mass, pain, itching, rash, ulcer     Endocrine: Negative for heat intolerance, cold intolerance, excessive sweating, polyuria, excess thirst     Hematologic/Lymph: Negative for anemia, bruising, easy bleeding, night sweats, petechiae, history of DVT/PE or cancer     Allergic/Immunologic: Negative for anaphylaxis, itchy/ teary eyes, itching, sneezing, swelling    Physical Exam  Constitutional:       Appearance: Normal appearance.   HENT:      Head: Normocephalic.   Eyes:      Pupils: Pupils are equal, round, and reactive to light.   Cardiovascular:      Rate and Rhythm: Normal rate.   Pulmonary:      Effort: Pulmonary effort is normal.   Abdominal:      General: Abdomen is flat. Bowel sounds are normal.      Palpations: Abdomen is soft.      Comments: Incisions clean dry and intact   Skin:     General: Skin is warm.   Neurological:      General: No focal deficit present.      Mental Status: He is alert.           Problem List Items Addressed This Visit             ICD-10-CM       Gastrointestinal and Abdominal    Diverticulitis of large intestine without perforation or abscess without bleeding - Primary K57.32     Continue high-fiber diet, ongoing lifting restrictions, follow-up as needed.

## 2024-03-03 DIAGNOSIS — J45.909 UNSPECIFIED ASTHMA, UNCOMPLICATED (HHS-HCC): ICD-10-CM

## 2024-03-04 RX ORDER — MONTELUKAST SODIUM 10 MG/1
10 TABLET ORAL EVERY EVENING
Qty: 90 TABLET | Refills: 1 | Status: SHIPPED | OUTPATIENT
Start: 2024-03-04

## 2024-03-05 ENCOUNTER — OFFICE VISIT (OUTPATIENT)
Dept: SURGERY | Facility: CLINIC | Age: 59
End: 2024-03-05
Payer: COMMERCIAL

## 2024-03-05 VITALS
BODY MASS INDEX: 34.45 KG/M2 | DIASTOLIC BLOOD PRESSURE: 75 MMHG | WEIGHT: 254 LBS | OXYGEN SATURATION: 97 % | HEART RATE: 95 BPM | TEMPERATURE: 97 F | SYSTOLIC BLOOD PRESSURE: 131 MMHG

## 2024-03-05 DIAGNOSIS — K57.32 DIVERTICULITIS OF LARGE INTESTINE WITHOUT PERFORATION OR ABSCESS WITHOUT BLEEDING: Primary | ICD-10-CM

## 2024-03-05 PROCEDURE — 3008F BODY MASS INDEX DOCD: CPT | Performed by: SURGERY

## 2024-03-05 PROCEDURE — 3078F DIAST BP <80 MM HG: CPT | Performed by: SURGERY

## 2024-03-05 PROCEDURE — 1036F TOBACCO NON-USER: CPT | Performed by: SURGERY

## 2024-03-05 PROCEDURE — 3075F SYST BP GE 130 - 139MM HG: CPT | Performed by: SURGERY

## 2024-03-05 PROCEDURE — 99024 POSTOP FOLLOW-UP VISIT: CPT | Performed by: SURGERY

## 2024-03-05 ASSESSMENT — PAIN SCALES - GENERAL: PAINLEVEL: 0-NO PAIN

## 2024-03-07 ENCOUNTER — APPOINTMENT (OUTPATIENT)
Dept: ENDOCRINOLOGY | Facility: CLINIC | Age: 59
End: 2024-03-07
Payer: COMMERCIAL

## 2024-04-19 ENCOUNTER — APPOINTMENT (OUTPATIENT)
Dept: SURGERY | Facility: CLINIC | Age: 59
End: 2024-04-19
Payer: COMMERCIAL

## 2024-05-02 ENCOUNTER — APPOINTMENT (OUTPATIENT)
Dept: ENDOCRINOLOGY | Facility: CLINIC | Age: 59
End: 2024-05-02
Payer: COMMERCIAL

## 2024-05-31 ENCOUNTER — OFFICE VISIT (OUTPATIENT)
Dept: PRIMARY CARE | Facility: CLINIC | Age: 59
End: 2024-05-31
Payer: COMMERCIAL

## 2024-05-31 VITALS
BODY MASS INDEX: 36.84 KG/M2 | WEIGHT: 272 LBS | HEART RATE: 105 BPM | OXYGEN SATURATION: 97 % | RESPIRATION RATE: 16 BRPM | HEIGHT: 72 IN | TEMPERATURE: 97.7 F | SYSTOLIC BLOOD PRESSURE: 128 MMHG | DIASTOLIC BLOOD PRESSURE: 78 MMHG

## 2024-05-31 DIAGNOSIS — Z00.00 ROUTINE MEDICAL EXAM: Primary | ICD-10-CM

## 2024-05-31 DIAGNOSIS — E11.65 TYPE 2 DIABETES MELLITUS WITH HYPERGLYCEMIA, WITHOUT LONG-TERM CURRENT USE OF INSULIN (MULTI): ICD-10-CM

## 2024-05-31 DIAGNOSIS — Z00.00 ROUTINE GENERAL MEDICAL EXAMINATION AT A HEALTH CARE FACILITY: ICD-10-CM

## 2024-05-31 DIAGNOSIS — Z12.5 SCREENING FOR MALIGNANT NEOPLASM OF PROSTATE: ICD-10-CM

## 2024-05-31 DIAGNOSIS — N41.0 ACUTE PROSTATITIS: ICD-10-CM

## 2024-05-31 LAB — POC HEMOGLOBIN A1C: 6.9 % (ref 4.2–6.5)

## 2024-05-31 PROCEDURE — 3008F BODY MASS INDEX DOCD: CPT | Performed by: FAMILY MEDICINE

## 2024-05-31 PROCEDURE — 3074F SYST BP LT 130 MM HG: CPT | Performed by: FAMILY MEDICINE

## 2024-05-31 PROCEDURE — 99386 PREV VISIT NEW AGE 40-64: CPT | Performed by: FAMILY MEDICINE

## 2024-05-31 PROCEDURE — 83036 HEMOGLOBIN GLYCOSYLATED A1C: CPT | Mod: QW | Performed by: FAMILY MEDICINE

## 2024-05-31 PROCEDURE — 3078F DIAST BP <80 MM HG: CPT | Performed by: FAMILY MEDICINE

## 2024-05-31 RX ORDER — CIPROFLOXACIN 500 MG/1
500 TABLET ORAL 2 TIMES DAILY
Qty: 28 TABLET | Refills: 0 | Status: SHIPPED | OUTPATIENT
Start: 2024-05-31 | End: 2024-06-14

## 2024-05-31 ASSESSMENT — COLUMBIA-SUICIDE SEVERITY RATING SCALE - C-SSRS
2. HAVE YOU ACTUALLY HAD ANY THOUGHTS OF KILLING YOURSELF?: NO
6. HAVE YOU EVER DONE ANYTHING, STARTED TO DO ANYTHING, OR PREPARED TO DO ANYTHING TO END YOUR LIFE?: NO
1. IN THE PAST MONTH, HAVE YOU WISHED YOU WERE DEAD OR WISHED YOU COULD GO TO SLEEP AND NOT WAKE UP?: NO

## 2024-05-31 ASSESSMENT — ENCOUNTER SYMPTOMS
MUSCULOSKELETAL NEGATIVE: 1
OCCASIONAL FEELINGS OF UNSTEADINESS: 0
RESPIRATORY NEGATIVE: 1
CARDIOVASCULAR NEGATIVE: 1
GASTROINTESTINAL NEGATIVE: 1
LOSS OF SENSATION IN FEET: 0
CONSTITUTIONAL NEGATIVE: 1
NEUROLOGICAL NEGATIVE: 1
DEPRESSION: 0

## 2024-05-31 ASSESSMENT — PATIENT HEALTH QUESTIONNAIRE - PHQ9
2. FEELING DOWN, DEPRESSED OR HOPELESS: NOT AT ALL
1. LITTLE INTEREST OR PLEASURE IN DOING THINGS: NOT AT ALL
SUM OF ALL RESPONSES TO PHQ9 QUESTIONS 1 AND 2: 0

## 2024-05-31 ASSESSMENT — PAIN SCALES - GENERAL: PAINLEVEL: 0-NO PAIN

## 2024-05-31 NOTE — PROGRESS NOTES
Subjective   Patient ID: Shaheen Ambriz is a 59 y.o. male who presents for New Patient Visit (Pt is here to establish care and for a pe and possible labs. ).    HPI colonoscopy is up to date in jan 23.  Had partial colectomy for recurrent diverticulitis.   Stress test 2019 wnl  Hx of Factor V leiden heterozygous    Review of Systems   Constitutional: Negative.    HENT: Negative.     Respiratory: Negative.     Cardiovascular: Negative.    Gastrointestinal: Negative.    Genitourinary: Negative.    Musculoskeletal: Negative.    Neurological: Negative.        Objective   /78 (BP Location: Left arm, Patient Position: Sitting, BP Cuff Size: Adult)   Pulse 105   Temp 36.5 °C (97.7 °F) (Temporal)   Resp 16   Ht 1.829 m (6')   Wt 123 kg (272 lb)   SpO2 97%   BMI 36.89 kg/m²     Physical Exam  Vitals and nursing note reviewed.   Constitutional:       General: He is not in acute distress.  HENT:      Right Ear: Tympanic membrane and ear canal normal.      Left Ear: Tympanic membrane and ear canal normal.      Nose: Nose normal. No rhinorrhea.      Mouth/Throat:      Pharynx: Oropharynx is clear. No oropharyngeal exudate or posterior oropharyngeal erythema.      Comments: Dentition wnl  Eyes:      Extraocular Movements: Extraocular movements intact.      Conjunctiva/sclera: Conjunctivae normal.      Pupils: Pupils are equal, round, and reactive to light.   Neck:      Vascular: No carotid bruit.   Cardiovascular:      Rate and Rhythm: Normal rate and regular rhythm.      Heart sounds: Normal heart sounds. No murmur heard.  Pulmonary:      Breath sounds: Normal breath sounds. No wheezing or rhonchi.   Abdominal:      General: Bowel sounds are normal. There is no distension.      Palpations: Abdomen is soft. There is no mass.      Tenderness: There is no abdominal tenderness. There is no guarding or rebound.      Hernia: No hernia is present.   Musculoskeletal:         General: No swelling or tenderness. Normal range  of motion.      Cervical back: Normal range of motion and neck supple.   Lymphadenopathy:      Cervical: No cervical adenopathy.   Skin:     General: Skin is warm.      Findings: No rash.   Neurological:      General: No focal deficit present.      Mental Status: He is alert.         Assessment/Plan   Problem List Items Addressed This Visit             ICD-10-CM    Hyperglycemia due to type 2 diabetes mellitus (Multi) E11.65    Relevant Orders    POCT glycosylated hemoglobin (Hb A1C) manually resulted (Completed)     Other Visit Diagnoses         Codes    Routine medical exam    -  Primary Z00.00    Relevant Orders    CT cardiac scoring wo IV contrast (Completed)    CBC and Auto Differential    Comprehensive Metabolic Panel    TSH with reflex to Free T4 if abnormal    Lipid Panel    Routine general medical examination at a health care facility     Z00.00    Screening for malignant neoplasm of prostate     Z12.5    Relevant Orders    Prostate Specific Antigen    Acute prostatitis     N41.0    Relevant Medications    ciprofloxacin (Cipro) 500 mg tablet

## 2024-06-06 ENCOUNTER — HOSPITAL ENCOUNTER (OUTPATIENT)
Dept: RADIOLOGY | Facility: CLINIC | Age: 59
Discharge: HOME | End: 2024-06-06
Payer: COMMERCIAL

## 2024-06-06 DIAGNOSIS — Z00.00 ROUTINE MEDICAL EXAM: ICD-10-CM

## 2024-06-06 PROCEDURE — 75571 CT HRT W/O DYE W/CA TEST: CPT

## 2024-06-14 DIAGNOSIS — F32.A DEPRESSION, UNSPECIFIED DEPRESSION TYPE: ICD-10-CM

## 2024-06-17 ENCOUNTER — APPOINTMENT (OUTPATIENT)
Dept: RADIOLOGY | Facility: HOSPITAL | Age: 59
End: 2024-06-17
Payer: COMMERCIAL

## 2024-06-17 ENCOUNTER — APPOINTMENT (OUTPATIENT)
Dept: CARDIOLOGY | Facility: HOSPITAL | Age: 59
End: 2024-06-17
Payer: COMMERCIAL

## 2024-06-17 ENCOUNTER — HOSPITAL ENCOUNTER (EMERGENCY)
Facility: HOSPITAL | Age: 59
Discharge: HOME | End: 2024-06-17
Payer: COMMERCIAL

## 2024-06-17 VITALS
BODY MASS INDEX: 36.64 KG/M2 | HEART RATE: 81 BPM | WEIGHT: 270.5 LBS | RESPIRATION RATE: 18 BRPM | SYSTOLIC BLOOD PRESSURE: 142 MMHG | HEIGHT: 72 IN | OXYGEN SATURATION: 95 % | DIASTOLIC BLOOD PRESSURE: 78 MMHG | TEMPERATURE: 98.4 F

## 2024-06-17 DIAGNOSIS — R10.84 ABDOMINAL PAIN, GENERALIZED: Primary | ICD-10-CM

## 2024-06-17 DIAGNOSIS — K76.9 LIVER LESION: ICD-10-CM

## 2024-06-17 LAB
ALBUMIN SERPL-MCNC: 4.3 G/DL (ref 3.5–5)
ALP BLD-CCNC: 129 U/L (ref 35–125)
ALT SERPL-CCNC: 30 U/L (ref 5–40)
ANION GAP SERPL CALC-SCNC: 10 MMOL/L
APPEARANCE UR: CLEAR
AST SERPL-CCNC: 24 U/L (ref 5–40)
BASOPHILS # BLD AUTO: 0.06 X10*3/UL (ref 0–0.1)
BASOPHILS NFR BLD AUTO: 0.5 %
BILIRUB SERPL-MCNC: 0.4 MG/DL (ref 0.1–1.2)
BILIRUB UR STRIP.AUTO-MCNC: NEGATIVE MG/DL
BUN SERPL-MCNC: 16 MG/DL (ref 8–25)
CALCIUM SERPL-MCNC: 9.5 MG/DL (ref 8.5–10.4)
CHLORIDE SERPL-SCNC: 103 MMOL/L (ref 97–107)
CO2 SERPL-SCNC: 25 MMOL/L (ref 24–31)
COLOR UR: YELLOW
CREAT SERPL-MCNC: 1.1 MG/DL (ref 0.4–1.6)
EGFRCR SERPLBLD CKD-EPI 2021: 78 ML/MIN/1.73M*2
EOSINOPHIL # BLD AUTO: 0.12 X10*3/UL (ref 0–0.7)
EOSINOPHIL NFR BLD AUTO: 1.1 %
ERYTHROCYTE [DISTWIDTH] IN BLOOD BY AUTOMATED COUNT: 14.6 % (ref 11.5–14.5)
GLUCOSE SERPL-MCNC: 138 MG/DL (ref 65–99)
GLUCOSE UR STRIP.AUTO-MCNC: NORMAL MG/DL
HCT VFR BLD AUTO: 46 % (ref 41–52)
HGB BLD-MCNC: 14.5 G/DL (ref 13.5–17.5)
IMM GRANULOCYTES # BLD AUTO: 0.04 X10*3/UL (ref 0–0.7)
IMM GRANULOCYTES NFR BLD AUTO: 0.4 % (ref 0–0.9)
KETONES UR STRIP.AUTO-MCNC: NEGATIVE MG/DL
LEUKOCYTE ESTERASE UR QL STRIP.AUTO: NEGATIVE
LIPASE SERPL-CCNC: 40 U/L (ref 16–63)
LYMPHOCYTES # BLD AUTO: 2.61 X10*3/UL (ref 1.2–4.8)
LYMPHOCYTES NFR BLD AUTO: 23.9 %
MAGNESIUM SERPL-MCNC: 2.1 MG/DL (ref 1.6–3.1)
MCH RBC QN AUTO: 25.9 PG (ref 26–34)
MCHC RBC AUTO-ENTMCNC: 31.5 G/DL (ref 32–36)
MCV RBC AUTO: 82 FL (ref 80–100)
MONOCYTES # BLD AUTO: 0.68 X10*3/UL (ref 0.1–1)
MONOCYTES NFR BLD AUTO: 6.2 %
MUCOUS THREADS #/AREA URNS AUTO: NORMAL /LPF
NEUTROPHILS # BLD AUTO: 7.41 X10*3/UL (ref 1.2–7.7)
NEUTROPHILS NFR BLD AUTO: 67.9 %
NITRITE UR QL STRIP.AUTO: NEGATIVE
NRBC BLD-RTO: 0 /100 WBCS (ref 0–0)
PH UR STRIP.AUTO: 6 [PH]
PLATELET # BLD AUTO: 453 X10*3/UL (ref 150–450)
POTASSIUM SERPL-SCNC: 4.1 MMOL/L (ref 3.4–5.1)
PROT SERPL-MCNC: 7.9 G/DL (ref 5.9–7.9)
PROT UR STRIP.AUTO-MCNC: ABNORMAL MG/DL
RBC # BLD AUTO: 5.6 X10*6/UL (ref 4.5–5.9)
RBC # UR STRIP.AUTO: NEGATIVE /UL
RBC #/AREA URNS AUTO: NORMAL /HPF
SODIUM SERPL-SCNC: 138 MMOL/L (ref 133–145)
SP GR UR STRIP.AUTO: 1.03
TROPONIN T SERPL-MCNC: 14 NG/L
TROPONIN T SERPL-MCNC: 14 NG/L
UROBILINOGEN UR STRIP.AUTO-MCNC: ABNORMAL MG/DL
WBC # BLD AUTO: 10.9 X10*3/UL (ref 4.4–11.3)
WBC #/AREA URNS AUTO: NORMAL /HPF

## 2024-06-17 PROCEDURE — 84075 ASSAY ALKALINE PHOSPHATASE: CPT | Performed by: PHYSICIAN ASSISTANT

## 2024-06-17 PROCEDURE — 76705 ECHO EXAM OF ABDOMEN: CPT

## 2024-06-17 PROCEDURE — 84484 ASSAY OF TROPONIN QUANT: CPT | Performed by: PHYSICIAN ASSISTANT

## 2024-06-17 PROCEDURE — 2500000004 HC RX 250 GENERAL PHARMACY W/ HCPCS (ALT 636 FOR OP/ED): Performed by: PHYSICIAN ASSISTANT

## 2024-06-17 PROCEDURE — 36415 COLL VENOUS BLD VENIPUNCTURE: CPT | Performed by: PHYSICIAN ASSISTANT

## 2024-06-17 PROCEDURE — 85025 COMPLETE CBC W/AUTO DIFF WBC: CPT | Performed by: PHYSICIAN ASSISTANT

## 2024-06-17 PROCEDURE — 99285 EMERGENCY DEPT VISIT HI MDM: CPT | Mod: 25

## 2024-06-17 PROCEDURE — 74176 CT ABD & PELVIS W/O CONTRAST: CPT | Performed by: RADIOLOGY

## 2024-06-17 PROCEDURE — 74176 CT ABD & PELVIS W/O CONTRAST: CPT

## 2024-06-17 PROCEDURE — 96374 THER/PROPH/DIAG INJ IV PUSH: CPT

## 2024-06-17 PROCEDURE — 96375 TX/PRO/DX INJ NEW DRUG ADDON: CPT

## 2024-06-17 PROCEDURE — 76705 ECHO EXAM OF ABDOMEN: CPT | Performed by: RADIOLOGY

## 2024-06-17 PROCEDURE — 71045 X-RAY EXAM CHEST 1 VIEW: CPT

## 2024-06-17 PROCEDURE — 93005 ELECTROCARDIOGRAM TRACING: CPT

## 2024-06-17 PROCEDURE — 83735 ASSAY OF MAGNESIUM: CPT | Performed by: PHYSICIAN ASSISTANT

## 2024-06-17 PROCEDURE — 71045 X-RAY EXAM CHEST 1 VIEW: CPT | Performed by: RADIOLOGY

## 2024-06-17 PROCEDURE — 81001 URINALYSIS AUTO W/SCOPE: CPT | Performed by: PHYSICIAN ASSISTANT

## 2024-06-17 PROCEDURE — 83690 ASSAY OF LIPASE: CPT | Performed by: PHYSICIAN ASSISTANT

## 2024-06-17 RX ORDER — KETOROLAC TROMETHAMINE 30 MG/ML
15 INJECTION, SOLUTION INTRAMUSCULAR; INTRAVENOUS ONCE
Status: COMPLETED | OUTPATIENT
Start: 2024-06-17 | End: 2024-06-17

## 2024-06-17 RX ORDER — ONDANSETRON HYDROCHLORIDE 2 MG/ML
4 INJECTION, SOLUTION INTRAVENOUS ONCE
Status: COMPLETED | OUTPATIENT
Start: 2024-06-17 | End: 2024-06-17

## 2024-06-17 RX ORDER — ONDANSETRON 4 MG/1
4 TABLET, ORALLY DISINTEGRATING ORAL EVERY 8 HOURS PRN
Qty: 20 TABLET | Refills: 0 | Status: SHIPPED | OUTPATIENT
Start: 2024-06-17 | End: 2024-06-24

## 2024-06-17 RX ORDER — FAMOTIDINE 20 MG/1
20 TABLET, FILM COATED ORAL DAILY
Qty: 30 TABLET | Refills: 0 | Status: SHIPPED | OUTPATIENT
Start: 2024-06-17 | End: 2024-07-17

## 2024-06-17 ASSESSMENT — PAIN SCALES - GENERAL
PAINLEVEL_OUTOF10: 2
PAINLEVEL_OUTOF10: 7
PAINLEVEL_OUTOF10: 0 - NO PAIN

## 2024-06-17 ASSESSMENT — COLUMBIA-SUICIDE SEVERITY RATING SCALE - C-SSRS
6. HAVE YOU EVER DONE ANYTHING, STARTED TO DO ANYTHING, OR PREPARED TO DO ANYTHING TO END YOUR LIFE?: NO
1. IN THE PAST MONTH, HAVE YOU WISHED YOU WERE DEAD OR WISHED YOU COULD GO TO SLEEP AND NOT WAKE UP?: NO
2. HAVE YOU ACTUALLY HAD ANY THOUGHTS OF KILLING YOURSELF?: NO

## 2024-06-17 ASSESSMENT — PAIN DESCRIPTION - DESCRIPTORS: DESCRIPTORS: PRESSURE

## 2024-06-17 ASSESSMENT — PAIN DESCRIPTION - PAIN TYPE: TYPE: ACUTE PAIN

## 2024-06-17 ASSESSMENT — PAIN - FUNCTIONAL ASSESSMENT: PAIN_FUNCTIONAL_ASSESSMENT: 0-10

## 2024-06-17 ASSESSMENT — PAIN DESCRIPTION - FREQUENCY: FREQUENCY: CONSTANT/CONTINUOUS

## 2024-06-17 ASSESSMENT — PAIN DESCRIPTION - PROGRESSION: CLINICAL_PROGRESSION: NOT CHANGED

## 2024-06-17 ASSESSMENT — PAIN DESCRIPTION - ORIENTATION: ORIENTATION: RIGHT;UPPER

## 2024-06-17 ASSESSMENT — PAIN DESCRIPTION - ONSET: ONSET: GRADUAL

## 2024-06-17 ASSESSMENT — PAIN DESCRIPTION - LOCATION: LOCATION: ABDOMEN

## 2024-06-17 NOTE — DISCHARGE INSTRUCTIONS
Be sure to follow up as directed in 1-2 days.  All of the details of your follow up instructions are detailed in the follow up section of this packet.     Follow-up with gastroenterology for further evaluation of your liver lesion        It is important to remember that your care does not end here and you must continue to monitor your condition closely. Please return to the emergency department for any worsening or concerning signs or symptoms as directed by our conversations and the discharge instructions. Otherwise please follow up with your doctor in 2 days if no better or worse. If you do not have a doctor please contact the referral number on your discharge instructions. Please contact any physician specialists provided in your discharge notes as it is very important to follow up with them regarding your condition. If you are unable to reach the physicians provided, please come back to the Emergency Department at any time.        Return to emergency room without delay for ANY new or worsening pains or for any other symptoms or concerns.

## 2024-06-17 NOTE — ED PROVIDER NOTES
HPI   Chief Complaint   Patient presents with    Abdominal Pain     RUQ x several days. Pt states he is having 7/10, pressure pain, states it gets worse after he eats. Inconsistent Bms (constipation to diarrhea), Pt has R shoulder radiation of pain as well. Pt is aox4, stable. Pt has had appendectomy. Pt is afebrile.       HPI  Patient is 59-year-old male here for evaluation of right upper quadrant pain that has started between Thursday and Friday of last week.  Has indicated that it seems to be worse with food.  No injury or trauma.  No fevers or chills.  No other associated symptoms aggravating or alleviating symptoms otherwise.  Does have a history of appendectomy in the past.                  No data recorded                   Patient History   Past Medical History:   Diagnosis Date    Anxiety     Asthma ([facility])     triggers: dust, weather changes-last use 9/2023    Diabetes mellitus (Multi)     A1C=7.1 10/12/23    Diverticulosis     multiple diverticulitis flares in past year    Factor V Leiden (Multi)     daily ASA, no hematologist, has been tx w/ Lovenox in past post op    GERD (gastroesophageal reflux disease)     Hyperlipidemia     Hypertension     Nephrolithiasis     last 2007    Sinusitis     r/t allergies    Syncope 09/2022    single episode, evaluated by Dr. Davey Garcia.  ECHO 9/12/22 WNL, Zio monitor WNL per patient.  No recurrance of symptoms     Past Surgical History:   Procedure Laterality Date    KNEE SURGERY Right 06/2023    LITHOTRIPSY  2007    SHOULDER Left 06/2019    clavical decompression    SIGMOIDECTOMY  02/07/2024    UMBILICAL HERNIA REPAIR  12/2021    VASECTOMY  2006     Family History   Problem Relation Name Age of Onset    Pancreatic cancer Mother      No Known Problems Father      No Known Problems Brother      Hypertension Other grandparent      Social History     Tobacco Use    Smoking status: Never    Smokeless tobacco: Never   Vaping Use    Vaping status: Never Used    Substance Use Topics    Alcohol use: Yes     Comment: pt drinks an ocassional glass of wine    Drug use: Never       Physical Exam   ED Triage Vitals [06/17/24 1157]   Temperature Heart Rate Respirations BP   36.9 °C (98.4 °F) 98 17 163/87      Pulse Ox Temp Source Heart Rate Source Patient Position   99 % Oral Monitor Sitting      BP Location FiO2 (%)     Right arm --       Physical Exam  PHYSICAL EXAMINATION    GENERAL APPEARANCE: Awake and alert.     VITAL SIGNS: As per the nurses' triage record.     HEENT: Normocephalic, atraumatic. Extraocular muscles are intact. Pupils equal round and reactive to light. Conjunctiva are pink. Negative scleral icterus. Mucous membranes are moist. Tongue in the midline. Pharynx was without erythema or exudates, uvula midline    NECK: Soft Nontender and supple, full gross ROM, no meningeal signs.    CHEST: Nontender to palpation. Clear to auscultation bilaterally. No rales, rhonchi, or wheezing.     HEART: S1, S2. Regular rate and rhythm. No murmurs, gallops or rubs.  Strong and equal pulses in the extremities.     ABDOMEN: Soft, subjective discomfort right upper quadrant, negative Lee sign, no guarding rebound or rigidity, nondistended, positive bowel sounds, no palpable masses.    MUSCULOSKELETAL: The calves are nontender to palpation. Full gross active range of motion. Ambulating on own with no acute difficulties     NEUROLOGICAL: Awake, alert and oriented x 3. Power intact in the upper and lower extremities. Sensation is intact to light touch in the upper and lower extremities.     IMMUNOLOGICAL: No lymphatic streaking noted     DERM: No petechiae, rashes, or ecchymoses.  ED Course & MDM   ED Course as of 06/17/24 1517   Mon Jun 17, 2024   1248 ECG 12 Lead  Performed at  1244, HR of 77, NSR, NAD, QTc 434, no sign of STEMI, no Q wave or T wave abnormality noted.    Reviewed and interpreted by me at time performed   [JM]   1509 CT abdomen pelvis wo IV contrast [AP]      ED  Course User Index  [AP] Geovanny Restrepo PA-C  [JM] Ophelia Cevallos MD         Diagnoses as of 06/17/24 1517   Abdominal pain, generalized       Medical Decision Making  Parts of this chart have been completed using voice recognition software. Please excuse any errors of transcription.  My thought process and reason for plan has been formulated from the time that I saw the patient until the time of disposition and is not specific to one specific moment during their visit and furthermore my MDM encompasses this entire chart and not only this text box.      HPI: Detailed above.    Exam: A medically appropriate exam performed, outlined above, given the known history and presentation.    History Limited by: Nothing    History obtained from: The patient    External/internal records reviewed: No external records reviewed    Social Determinants of Health considered during this visit: Lives at home    Chronic conditions impacting care: Denies any chronic medical conditions relevant to today's complaint    Medications given during visit:  Medications   sodium chloride 0.9 % bolus 1,000 mL (0 mL intravenous Stopped 6/17/24 1250)   ondansetron (Zofran) injection 4 mg (4 mg intravenous Given 6/17/24 1228)   ketorolac (Toradol) injection 15 mg (15 mg intravenous Given 6/17/24 1226)        Diagnostic/tests  Labs Reviewed   CBC WITH AUTO DIFFERENTIAL - Abnormal       Result Value    WBC 10.9      nRBC 0.0      RBC 5.60      Hemoglobin 14.5      Hematocrit 46.0      MCV 82      MCH 25.9 (*)     MCHC 31.5 (*)     RDW 14.6 (*)     Platelets 453 (*)     Neutrophils % 67.9      Immature Granulocytes %, Automated 0.4      Lymphocytes % 23.9      Monocytes % 6.2      Eosinophils % 1.1      Basophils % 0.5      Neutrophils Absolute 7.41      Immature Granulocytes Absolute, Automated 0.04      Lymphocytes Absolute 2.61      Monocytes Absolute 0.68      Eosinophils Absolute 0.12      Basophils Absolute 0.06     COMPREHENSIVE  METABOLIC PANEL - Abnormal    Glucose 138 (*)     Sodium 138      Potassium 4.1      Chloride 103      Bicarbonate 25      Urea Nitrogen 16      Creatinine 1.10      eGFR 78      Calcium 9.5      Albumin 4.3      Alkaline Phosphatase 129 (*)     Total Protein 7.9      AST 24      Bilirubin, Total 0.4      ALT 30      Anion Gap 10     URINALYSIS WITH REFLEX CULTURE AND MICROSCOPIC - Abnormal    Color, Urine Yellow      Appearance, Urine Clear      Specific Gravity, Urine 1.030      pH, Urine 6.0      Protein, Urine 10 (TRACE)      Glucose, Urine Normal      Blood, Urine NEGATIVE      Ketones, Urine NEGATIVE      Bilirubin, Urine NEGATIVE      Urobilinogen, Urine 3 (1+) (*)     Nitrite, Urine NEGATIVE      Leukocyte Esterase, Urine NEGATIVE     LIPASE - Normal    Lipase 40     MAGNESIUM - Normal    Magnesium 2.10     SERIAL TROPONIN, INITIAL ([facility]) - Normal    Troponin T, High Sensitivity 14     SERIAL TROPONIN,  2 HOUR ([facility]) - Normal    Troponin T, High Sensitivity 14     TROPONIN T SERIES, HIGH SENSITIVITY (0, 2 HR, 6 HR)    Narrative:     The following orders were created for panel order Troponin T Series, High Sensitivity (0, 2HR, 6HR).  Procedure                               Abnormality         Status                     ---------                               -----------         ------                     Serial Troponin, Initial...[[id number]]  Normal              Final result               Serial Troponin, 2 Hour ...[[id number]]  Normal              Final result               Serial Troponin, 6 Hour ...[[id number]]                                                   Please view results for these tests on the individual orders.   URINALYSIS WITH REFLEX CULTURE AND MICROSCOPIC    Narrative:     The following orders were created for panel order Urinalysis with Reflex Culture and Microscopic.  Procedure                               Abnormality         Status                     ---------                                -----------         ------                     Urinalysis with Reflex C...[[id number]]  Abnormal            Final result               Extra Urine Gray Tube[[id number]]                                                         Please view results for these tests on the individual orders.   EXTRA URINE GRAY TUBE   SERIAL TROPONIN, 6 HOUR ([facility])   URINALYSIS MICROSCOPIC WITH REFLEX CULTURE    WBC, Urine 1-5      RBC, Urine 1-2      Mucus, Urine FEW        CT abdomen pelvis wo IV contrast   Final Result   1. Bilateral nonobstructing renal calculi and stable large left renal   cyst without ureteral stones or hydronephrosis.   2. Hepatic steatosis with areas of focal sparing.   3. Interval partial sigmoid resection without bowel obstruction.   Colonic diverticulosis without acute diverticulitis.        Signed by: Khai Dean 6/17/2024 3:01 PM   Dictation workstation:   [id number]      XR chest 1 view   Final Result   1.  No evidence of acute cardiopulmonary process.             Signed by: George Leblanc 6/17/2024 2:12 PM   Dictation workstation:   [id number]      US gallbladder   Final Result   Diffusely increased hepatic echogenicity with somewhat coarsened   hepatic echotexture likely representing hepatic steatosis and   possible additional diffuse hepatocellular disease such as cirrhosis.        Geographic hypoechoic region or lesion in the right hepatic lobe   measuring up to 9.0 x 6.7 x 5.2 cm. This statistically likely   represents region of focal fatty sparing, however follow-up   nonemergent hepatic mass protocol MRI is advised for further   assessment.        Nonobstructing right-sided nephrolithiasis.        Thickened gallbladder wall, nonspecific given contracted state.        MACRO:   None             Signed by: George Leblanc 6/17/2024 2:12 PM   Dictation workstation:   [id number]             Case discussed with: attending    Considerations/further MDM:  I estimate there is low risk for acute abdomen.  I  have considered the following: acute appendicitis, bowel obstruction, cholecystitis, diverticulitis, incarcerated hernia, mesenteric ischemia, pancreatitis, acute liver failure, renal failure, UTI/Pyelonephritis to an extent that requires admission and IV abx, perforated bowel, or ulcers.      Due to the fact that the patient was having right upper quadrant abdominal pain and ultrasound was provided, largely nonacute.  Therefore CT was done to further investigate, of which no significant acute diagnostics that would require surgical intervention or hospitalization were found, patient is not having uncontrolled symptoms, appears well looks well has no respiratory distress.  And appears comfortable.  Return precaution follow-up instructions discussed      Thus I consider the discharge disposition reasonable. Also, there is no evidence or peritonitis, sepsis, or toxicity. We have discussed the diagnosis and risks, and we agree with discharging home to follow-up with appropriate physician as directed    . We also discussed returning to the Emergency Department immediately if new or worsening symptoms occur.     . Pt symptoms have been well controlled here with medications and the patient is lower risk for acute/surgical abdomen and is safe for discharge with appropriate outpatient follow up.  The patient has verbalized understanding to return to ER without delay for new or worsening pains or for any other symptoms or concerns.    Patient will be given referral to gastroenterology and PCP for further evaluation of current symptoms, return precautions follow-up instructions to the ER discussed.      Procedure  Procedures     Geovanny Restrepo PA-C  06/17/24 1513       Geovanny Restrepo PA-C  06/17/24 1510

## 2024-06-18 RX ORDER — FLUOXETINE HYDROCHLORIDE 40 MG/1
40 CAPSULE ORAL DAILY
Qty: 90 CAPSULE | Refills: 1 | Status: SHIPPED | OUTPATIENT
Start: 2024-06-18

## 2024-06-19 LAB
ATRIAL RATE: 77 BPM
P AXIS: 44 DEGREES
P OFFSET: 193 MS
P ONSET: 134 MS
PR INTERVAL: 166 MS
Q ONSET: 217 MS
QRS COUNT: 13 BEATS
QRS DURATION: 86 MS
QT INTERVAL: 384 MS
QTC CALCULATION(BAZETT): 434 MS
QTC FREDERICIA: 417 MS
R AXIS: -1 DEGREES
T AXIS: 44 DEGREES
T OFFSET: 409 MS
VENTRICULAR RATE: 77 BPM

## 2024-06-20 ENCOUNTER — OFFICE VISIT (OUTPATIENT)
Dept: GASTROENTEROLOGY | Facility: CLINIC | Age: 59
End: 2024-06-20
Payer: COMMERCIAL

## 2024-06-20 VITALS
SYSTOLIC BLOOD PRESSURE: 150 MMHG | HEIGHT: 72 IN | OXYGEN SATURATION: 99 % | HEART RATE: 97 BPM | BODY MASS INDEX: 37.11 KG/M2 | WEIGHT: 274 LBS | DIASTOLIC BLOOD PRESSURE: 84 MMHG

## 2024-06-20 DIAGNOSIS — R10.11 RUQ PAIN: ICD-10-CM

## 2024-06-20 DIAGNOSIS — K76.9 LIVER LESION: ICD-10-CM

## 2024-06-20 DIAGNOSIS — K76.0 FATTY LIVER: Primary | ICD-10-CM

## 2024-06-20 PROCEDURE — 1036F TOBACCO NON-USER: CPT | Performed by: INTERNAL MEDICINE

## 2024-06-20 PROCEDURE — 3079F DIAST BP 80-89 MM HG: CPT | Performed by: INTERNAL MEDICINE

## 2024-06-20 PROCEDURE — 3077F SYST BP >= 140 MM HG: CPT | Performed by: INTERNAL MEDICINE

## 2024-06-20 PROCEDURE — 3008F BODY MASS INDEX DOCD: CPT | Performed by: INTERNAL MEDICINE

## 2024-06-20 PROCEDURE — 99204 OFFICE O/P NEW MOD 45 MIN: CPT | Performed by: INTERNAL MEDICINE

## 2024-06-20 RX ORDER — UBIDECARENONE 30 MG
30 CAPSULE ORAL DAILY
COMMUNITY

## 2024-06-20 RX ORDER — POLYETHYLENE GLYCOL 3350 17 G/17G
17 POWDER, FOR SOLUTION ORAL DAILY
COMMUNITY

## 2024-06-20 ASSESSMENT — PAIN SCALES - GENERAL: PAINLEVEL: 3

## 2024-06-20 NOTE — PROGRESS NOTES
HEPATOLOGY NEW PATIENT VISIT    June 18, 2024    Dr. Ignacio Damian       Patient Name:   LUC AMBRIZ  Medical Record Number: [id number]  YOB: 1965    Dear Dr. Damian,    I had the pleasure of seeing Luc Ambriz for consultation in the [facility] ([facility] office). History and physical examination was performed. Pertinent available laboratory, imaging, pathology results were reviewed.     History of Present Illness:   The patient is a 59 year old white male who is emergently referred for evaluation of fatty liver disease.    The patient had presented to the emergency room on 6/17/2024 with right upper quadrant abdominal pain.  CAT scan showed hepatic steatosis.  Even though he has a gastroenterologist, he was apparently told to come see me emergently for the fatty liver disease.    The pain had started about a week ago and was mild initially but then worsened to about a 6 or 7 out of 10.  It was in the right upper quadrant and midepigastric region and radiated to the right shoulder.  Because it worsened, he went to the emergency room.  There was no nausea, vomiting or GI bleeding.  He denied the use of any NSAIDs.  He reports that the pain is now down to about a 3 out of 10.  He denied any trauma.  He denied any shingles.    The patient has had abdominal pain (mostly left-sided) for many years and has undergone extensive imaging studies.  He has actually had documented fatty liver disease for many years.    The patient was diagnosed with fatty liver disease many years ago.  The patient has risk factors for fatty liver disease including diabetes, hyperlipidemia, and obesity.  He is on medication for hyperlipidemia.  He is on medication for diabetes.  He has not been able to lose weight.    The patient denied any past history of acute hepatitis or jaundice.      He has never had any manifestations of liver disease including no jaundice, ascites, hepatic encephalopathy,  or variceal bleeding. He denied ever having a liver biopsy.    He presented today for evaluation.  He denied any specific liver-related complaints.  He is still having the right-sided abdominal pain as noted above.    Past Medical/Surgical History:   Tinea corporis  Otitis, serous  Nephrolithiasis  Mixed hyperlipidemia  Microscopic hematuria  Lumbosacral spondylosis without myelopathy  Degeneration of lumbar or lumbosacral intervertebral disc  Insomnia, unspecified  Hyperglycemia due to type 2 diabetes mellitus (Multi)  Heart palpitations  Gastroesophageal reflux disease without esophagitis  Dyspnea on exertion  Diverticulosis  Displacement of lumbar intervertebral disc without myelopathy  Depression  Constipation  Asthma (HHS-HCC)  Anxiety  Allergic rhinitis  Acute pansinusitis  Acute bronchitis  Lumbago  Acute bilateral low back pain with right-sided sciatica  Abdominal pain  Abdominal distention  Calculus of ureter  Adjustment reaction  Type 2 diabetes mellitus without complication, without long-term current use of insulin (Multi)  Diverticulitis of large intestine without perforation or abscess without bleeding  BMI 34.0-34.9,adult  Obesity  Factor V Leiden (Multi)    Family History:   Mother had pancreatic cancer.  Aunts and uncles have had fatty liver disease.  There is no known family history of colon cancer.    Social History:   He denied tobacco use.  He drinks alcohol occasionally.  He denied blood transfusions.  He denied intravenous drug use.  He does have a tattoo.  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    Review of systems: As noted above.  He has the abdominal pain as noted above.  No fever, chills, weight loss, visual changes, auditory changes, shortness of breath, chest pain, GI bleeding, diarrhea, constipation, depression, dysuria, hematuria, musculoskeletal issues, or rash.       Medical, Surgical, Family, and Social Histories  Past Medical History:    Diagnosis Date    Anxiety     Asthma ([facility])     triggers: dust, weather changes-last use 9/2023    Diabetes mellitus (Multi)     A1C=7.1 10/12/23    Diverticulosis     multiple diverticulitis flares in past year    Factor V Leiden (Multi)     daily ASA, no hematologist, has been tx w/ Lovenox in past post op    GERD (gastroesophageal reflux disease)     Hyperlipidemia     Hypertension     Nephrolithiasis     last 2007    Sinusitis     r/t allergies    Syncope 09/2022    single episode, evaluated by Dr. Davey Garcia.  ECHO 9/12/22 WNL, Zio monitor WNL per patient.  No recurrance of symptoms       Past Surgical History:   Procedure Laterality Date    KNEE SURGERY Right 06/2023    LITHOTRIPSY  2007    SHOULDER Left 06/2019    clavical decompression    SIGMOIDECTOMY  02/07/2024    UMBILICAL HERNIA REPAIR  12/2021    VASECTOMY  2006       Family History   Problem Relation Name Age of Onset    Pancreatic cancer Mother      No Known Problems Father      No Known Problems Brother      Hypertension Other grandparent        Social History     Socioeconomic History    Marital status:      Spouse name: Not on file    Number of children: Not on file    Years of education: Not on file    Highest education level: Not on file   Occupational History    Not on file   Tobacco Use    Smoking status: Never    Smokeless tobacco: Never   Vaping Use    Vaping status: Never Used   Substance and Sexual Activity    Alcohol use: Yes     Comment: pt drinks an ocassional glass of wine    Drug use: Never    Sexual activity: Not on file   Other Topics Concern    Not on file   Social History Narrative    Not on file     Social Determinants of Health     Financial Resource Strain: Low Risk  (2/8/2024)    Overall Financial Resource Strain (CARDIA)     Difficulty of Paying Living Expenses: Not hard at all   Food Insecurity: Not on file   Transportation Needs: No Transportation Needs (2/8/2024)    PRAPARE - Transportation     Lack of  Transportation (Medical): No     Lack of Transportation (Non-Medical): No   Physical Activity: Not on file   Stress: Not on file   Social Connections: Not on file   Intimate Partner Violence: Not on file   Housing Stability: Low Risk  (2/8/2024)    Housing Stability Vital Sign     Unable to Pay for Housing in the Last Year: No     Number of Places Lived in the Last Year: 1     Unstable Housing in the Last Year: No       Allergies and Current Medications  Allergies   Allergen Reactions    Dulaglutide Anxiety and GI Upset     Critically High    Iodinated Contrast Media Anaphylaxis, Hives and Itching    Metformin Hcl GI Upset    Theophylline Palpitations and GI Upset     Current Outpatient Medications   Medication Sig Dispense Refill    FLUoxetine (PROzac) 40 mg capsule TAKE 1 CAPSULE BY MOUTH ONCE DAILY 90 capsule 1    glimepiride (Amaryl) 2 mg tablet Take 0.5 tablets (1 mg) by mouth once daily. as directed      loratadine (Claritin) 10 mg tablet Take 1 tablet (10 mg) by mouth once daily.      montelukast (Singulair) 10 mg tablet TAKE 1 TABLET BY MOUTH EVERY DAY IN THE EVENING 90 tablet 1    omeprazole OTC (PriLOSEC OTC) 20 mg EC tablet Take 1 capsule by mouth once daily.      traZODone (Desyrel) 50 mg tablet Take 1 tablet (50 mg) by mouth once daily at bedtime. 90 tablet 1    Wixela Inhub 250-50 mcg/dose diskus inhaler INHALE 1 PUFF BY MOUTH TWICE A DAY INHALATION TWICE A DAY 90 DAYS 180 each 2    albuterol sulfate (Proair Digihaler) 90 mcg/actuation aero powdr breath act w/sensor inhaler Inhale 2 puffs every 4 hours.      aspirin 81 mg EC tablet Take 1 tablet (81 mg) by mouth once daily.      atorvastatin (Lipitor) 20 mg tablet Take 1 tablet (20 mg) by mouth once daily.      co-enzyme Q-10 30 mg capsule Take 1 capsule (30 mg) by mouth once daily.      famotidine (Pepcid) 20 mg tablet Take 1 tablet (20 mg) by mouth once daily. 30 tablet 0    ondansetron ODT (Zofran-ODT) 4 mg disintegrating tablet Take 1 tablet (4 mg)  by mouth every 8 hours if needed for nausea or vomiting for up to 7 days. 20 tablet 0    polyethylene glycol (Glycolax, Miralax) 17 gram packet Take 17 g by mouth once daily.       No current facility-administered medications for this visit.        Physical Exam  /84   Pulse 97   Ht 1.829 m (6')   Wt 124 kg (274 lb)   SpO2 99%   BMI 37.16 kg/m²       Physical Examination:   General Appearance: alert and in no acute distress.   HEENT: oropharynx without lesions. Anicteric sclerae.   Neck supple, nontender, without adenopathy, thyromegaly, or JVD.   Lungs clear to auscultation and percussion.   Heart RRR without murmurs, rubs, or gallops.   Abdomen: Soft, nontender, bowel sounds positive, without obvious ascites. Liver felt about 6 cm below the right costal margin and spleen not palpable.   Extremities full ROM, no atrophy, normal strength. No edema.   Skin no specific lesions.   Neurological exam nonfocal, alert and oriented.  No asterixis.  No spider angiomata, or palmar erythema.     Labs 6/17/2024 troponin normal at 14, lipase 40, WBC 10.9, hemoglobin 14.5, platelets 453, glucose 138, sodium 138, creatinine 1.1, protein 7.9, albumin 4.3, alk phos 129, bilirubin 0.4, AST 24, ALT 30.    Labs 5/31/2024 hemoglobin A1c 6.9%.    Labs 5/1/2023 cholesterol 138, triglycerides 79.    Labs 4/23/2022 hemoglobin A1c 8%.    CAT scan without contrast 6/17/2024:  IMPRESSION:  1. Bilateral nonobstructing renal calculi and stable large left renal  cyst without ureteral stones or hydronephrosis.  2. Hepatic steatosis with areas of focal sparing.  3. Interval partial sigmoid resection without bowel obstruction.  Colonic diverticulosis without acute diverticulitis.    Ultrasound 6/17/2024:  IMPRESSION:  Diffusely increased hepatic echogenicity with somewhat coarsened  hepatic echotexture likely representing hepatic steatosis and  possible additional diffuse hepatocellular disease such as cirrhosis.      Geographic  hypoechoic region or lesion in the right hepatic lobe  measuring up to 9.0 x 6.7 x 5.2 cm. This statistically likely  represents region of focal fatty sparing, however follow-up  nonemergent hepatic mass protocol MRI is advised for further  assessment.      Nonobstructing right-sided nephrolithiasis.      Thickened gallbladder wall, nonspecific given contracted state.    CAT scan without contrast 9/8/2022:  IMPRESSION:  No acute abdominal or pelvic abnormalities noted.     Fatty liver.     5 cm left renal cyst with multiple bilateral nonobstructing intrarenal  calculi.     Radiographically uncomplicated colonic diverticulosis.     Stable 5 mm pleural-based nodule right lower lobe.    CAT scan without contrast 6/18/2018:  Impression: Minute nonobstructing calculus in the upper pole of the left  kidney without hydronephrosis. Colonic diverticulosis without acute  diverticulitis. Small fat-containing umbilical hernia. Hepatic steatosis.    Ultrasound 6/2/2016:  IMPRESSION:       1. Fatty liver  2. Normal gallbladder and common hepatic duct        Assessment/Plan:     Fatty liver disease  Abdominal pain    The patient is referred to me for evaluation of fatty liver disease.    We did speak about fatty liver disease. I explained that the risk factors include diabetes, obesity, hyperlipidemia and alcohol use. I explained that he needs to work on diet, weight loss and exercise.  He also needs to work aggressively with his primary provider about hyperlipidemia and diabetes.  I did explain that 20-25% of patients with PARKER may proceed to cirrhosis.    I reminded him that working on the same risk factors could decrease his future risk of heart disease and stroke.    In fact, the fatty liver disease has been present for many years on the multiple CAT scans that he has had for his chronic abdominal pain.  It does not appear to have ever been worked up before.    I will stage his liver disease with a FibroSCAN test.    I will do  some basic hepatitis serologies.    Fatty liver disease would not explain his acute abdominal pain.  Apparently, there was a concern in the emergency room that his pain could be from his gallbladder.  I will recommend a HIDA scan.    The ultrasound in the emergency room also suggested the possibility of a liver lesion.  This was not seen on the CAT scan, but the CAT scan was a noncontrast study.  At the recommendation of the radiologist, we will do an AFP as well as an MRI.    He can follow-up with Dr. Craig for his GI needs.    Thank you for allowing me to participate in the care of this patient. Please feel free to contact me with any questions regarding their care.     Sincerely,     Torin Torres MD, FAASLD, FACG.   Medical Director, Hepatology  Senior Attending Physician  Digestive [facility]  [facility]  Professor of Medicine  Division of Gastroenterology and Liver Disease  [facility] [employer]  [address] [place] [id number]  Phone: [phone]  Fax: [phone].    Cc: Dr. Josue Craig      This document was generated with a computerized dictation system.  Because of this, there could be errors in grammar and/or content.

## 2024-06-20 NOTE — PATIENT INSTRUCTIONS
Get lab tests.    Get an MRI of the liver.    Get a FibroScan ultrasound of the liver.    Get a HIDA scan of the gallbladder.    Follow-up with Dr. Craig.

## 2024-06-29 ENCOUNTER — LAB (OUTPATIENT)
Dept: LAB | Facility: LAB | Age: 59
End: 2024-06-29
Payer: COMMERCIAL

## 2024-06-29 DIAGNOSIS — K76.0 FATTY LIVER: ICD-10-CM

## 2024-06-29 DIAGNOSIS — Z00.00 ROUTINE MEDICAL EXAM: ICD-10-CM

## 2024-06-29 DIAGNOSIS — K76.9 LIVER LESION: ICD-10-CM

## 2024-06-29 DIAGNOSIS — R10.11 RUQ PAIN: ICD-10-CM

## 2024-06-29 DIAGNOSIS — Z12.5 SCREENING FOR MALIGNANT NEOPLASM OF PROSTATE: ICD-10-CM

## 2024-06-29 LAB
AFP SERPL-MCNC: 4 NG/ML (ref 0–9)
ALBUMIN SERPL BCP-MCNC: 4.3 G/DL (ref 3.4–5)
ALP SERPL-CCNC: 102 U/L (ref 33–120)
ALT SERPL W P-5'-P-CCNC: 32 U/L (ref 10–52)
ANION GAP SERPL CALC-SCNC: 19 MMOL/L (ref 10–20)
AST SERPL W P-5'-P-CCNC: 26 U/L (ref 9–39)
BASOPHILS # BLD AUTO: 0.06 X10*3/UL (ref 0–0.1)
BASOPHILS NFR BLD AUTO: 0.5 %
BILIRUB SERPL-MCNC: 0.6 MG/DL (ref 0–1.2)
BUN SERPL-MCNC: 16 MG/DL (ref 6–23)
CALCIUM SERPL-MCNC: 9.4 MG/DL (ref 8.6–10.3)
CHLORIDE SERPL-SCNC: 102 MMOL/L (ref 98–107)
CHOLEST SERPL-MCNC: 129 MG/DL (ref 0–199)
CHOLESTEROL/HDL RATIO: 3
CO2 SERPL-SCNC: 24 MMOL/L (ref 21–32)
CREAT SERPL-MCNC: 1.15 MG/DL (ref 0.5–1.3)
EGFRCR SERPLBLD CKD-EPI 2021: 74 ML/MIN/1.73M*2
EOSINOPHIL # BLD AUTO: 0.19 X10*3/UL (ref 0–0.7)
EOSINOPHIL NFR BLD AUTO: 1.6 %
ERYTHROCYTE [DISTWIDTH] IN BLOOD BY AUTOMATED COUNT: 14.8 % (ref 11.5–14.5)
GLUCOSE SERPL-MCNC: 174 MG/DL (ref 74–99)
HAV AB SER QL IA: NONREACTIVE
HBV SURFACE AB SER-ACNC: 48.5 MIU/ML
HBV SURFACE AG SERPL QL IA: NONREACTIVE
HCT VFR BLD AUTO: 45.5 % (ref 41–52)
HCV AB SER QL: NONREACTIVE
HDLC SERPL-MCNC: 42.3 MG/DL
HGB BLD-MCNC: 13.9 G/DL (ref 13.5–17.5)
IMM GRANULOCYTES # BLD AUTO: 0.07 X10*3/UL (ref 0–0.7)
IMM GRANULOCYTES NFR BLD AUTO: 0.6 % (ref 0–0.9)
LDLC SERPL CALC-MCNC: 66 MG/DL
LYMPHOCYTES # BLD AUTO: 2.39 X10*3/UL (ref 1.2–4.8)
LYMPHOCYTES NFR BLD AUTO: 20.6 %
MCH RBC QN AUTO: 26.1 PG (ref 26–34)
MCHC RBC AUTO-ENTMCNC: 30.5 G/DL (ref 32–36)
MCV RBC AUTO: 85 FL (ref 80–100)
MONOCYTES # BLD AUTO: 0.63 X10*3/UL (ref 0.1–1)
MONOCYTES NFR BLD AUTO: 5.4 %
NEUTROPHILS # BLD AUTO: 8.24 X10*3/UL (ref 1.2–7.7)
NEUTROPHILS NFR BLD AUTO: 71.3 %
NON HDL CHOLESTEROL: 87 MG/DL (ref 0–149)
NRBC BLD-RTO: 0 /100 WBCS (ref 0–0)
PLATELET # BLD AUTO: 457 X10*3/UL (ref 150–450)
POTASSIUM SERPL-SCNC: 4.5 MMOL/L (ref 3.5–5.3)
PROT SERPL-MCNC: 7.2 G/DL (ref 6.4–8.2)
PSA SERPL-MCNC: 0.52 NG/ML
RBC # BLD AUTO: 5.33 X10*6/UL (ref 4.5–5.9)
SODIUM SERPL-SCNC: 140 MMOL/L (ref 136–145)
TRIGL SERPL-MCNC: 104 MG/DL (ref 0–149)
TSH SERPL-ACNC: 1.22 MIU/L (ref 0.44–3.98)
VLDL: 21 MG/DL (ref 0–40)
WBC # BLD AUTO: 11.6 X10*3/UL (ref 4.4–11.3)

## 2024-06-29 PROCEDURE — 85025 COMPLETE CBC W/AUTO DIFF WBC: CPT

## 2024-06-29 PROCEDURE — 87340 HEPATITIS B SURFACE AG IA: CPT

## 2024-06-29 PROCEDURE — 86706 HEP B SURFACE ANTIBODY: CPT

## 2024-06-29 PROCEDURE — 36415 COLL VENOUS BLD VENIPUNCTURE: CPT

## 2024-06-29 PROCEDURE — 80053 COMPREHEN METABOLIC PANEL: CPT

## 2024-06-29 PROCEDURE — 84443 ASSAY THYROID STIM HORMONE: CPT

## 2024-06-29 PROCEDURE — 84153 ASSAY OF PSA TOTAL: CPT

## 2024-06-29 PROCEDURE — 86708 HEPATITIS A ANTIBODY: CPT

## 2024-06-29 PROCEDURE — 82105 ALPHA-FETOPROTEIN SERUM: CPT

## 2024-06-29 PROCEDURE — 86803 HEPATITIS C AB TEST: CPT

## 2024-06-29 PROCEDURE — 80061 LIPID PANEL: CPT

## 2024-07-02 DIAGNOSIS — T50.8X5D ALLERGIC REACTION TO CONTRAST MATERIAL, SUBSEQUENT ENCOUNTER: ICD-10-CM

## 2024-07-02 RX ORDER — PREDNISONE 50 MG/1
50 TABLET ORAL SEE ADMIN INSTRUCTIONS
Qty: 3 TABLET | Refills: 0 | Status: SHIPPED | OUTPATIENT
Start: 2024-07-02

## 2024-07-10 ENCOUNTER — CLINICAL SUPPORT (OUTPATIENT)
Dept: GASTROENTEROLOGY | Facility: HOSPITAL | Age: 59
End: 2024-07-10
Payer: COMMERCIAL

## 2024-07-10 DIAGNOSIS — K76.9 LIVER LESION: ICD-10-CM

## 2024-07-10 DIAGNOSIS — R10.11 RUQ PAIN: ICD-10-CM

## 2024-07-10 DIAGNOSIS — K76.0 FATTY LIVER: ICD-10-CM

## 2024-07-10 PROCEDURE — 91200 LIVER ELASTOGRAPHY: CPT | Performed by: INTERNAL MEDICINE

## 2024-07-12 ENCOUNTER — HOSPITAL ENCOUNTER (OUTPATIENT)
Dept: RADIOLOGY | Facility: CLINIC | Age: 59
Discharge: HOME | End: 2024-07-12
Payer: COMMERCIAL

## 2024-07-12 ENCOUNTER — HOSPITAL ENCOUNTER (OUTPATIENT)
Dept: RADIOLOGY | Facility: HOSPITAL | Age: 59
Discharge: HOME | End: 2024-07-12
Payer: COMMERCIAL

## 2024-07-12 DIAGNOSIS — K76.0 FATTY LIVER: ICD-10-CM

## 2024-07-12 DIAGNOSIS — R10.11 RUQ PAIN: ICD-10-CM

## 2024-07-12 DIAGNOSIS — K76.9 LIVER LESION: ICD-10-CM

## 2024-07-12 PROCEDURE — 3430000001 HC RX 343 DIAGNOSTIC RADIOPHARMACEUTICALS: Performed by: PHYSICIAN ASSISTANT

## 2024-07-12 PROCEDURE — 2500000004 HC RX 250 GENERAL PHARMACY W/ HCPCS (ALT 636 FOR OP/ED): Performed by: PHYSICIAN ASSISTANT

## 2024-07-12 PROCEDURE — A9575 INJ GADOTERATE MEGLUMI 0.1ML: HCPCS | Performed by: PHYSICIAN ASSISTANT

## 2024-07-12 PROCEDURE — A9537 TC99M MEBROFENIN: HCPCS | Performed by: PHYSICIAN ASSISTANT

## 2024-07-12 PROCEDURE — 78227 HEPATOBIL SYST IMAGE W/DRUG: CPT

## 2024-07-12 PROCEDURE — 2550000001 HC RX 255 CONTRASTS: Performed by: PHYSICIAN ASSISTANT

## 2024-07-12 PROCEDURE — 74183 MRI ABD W/O CNTR FLWD CNTR: CPT

## 2024-07-12 RX ORDER — SINCALIDE 5 UG/5ML
0.02 INJECTION, POWDER, LYOPHILIZED, FOR SOLUTION INTRAVENOUS ONCE
Status: COMPLETED | OUTPATIENT
Start: 2024-07-12 | End: 2024-07-12

## 2024-07-12 RX ORDER — GADOTERATE MEGLUMINE 376.9 MG/ML
20 INJECTION INTRAVENOUS
Status: COMPLETED | OUTPATIENT
Start: 2024-07-12 | End: 2024-07-12

## 2024-07-12 RX ORDER — KIT FOR THE PREPARATION OF TECHNETIUM TC 99M MEBROFENIN 45 MG/10ML
5.8 INJECTION, POWDER, LYOPHILIZED, FOR SOLUTION INTRAVENOUS
Status: COMPLETED | OUTPATIENT
Start: 2024-07-12 | End: 2024-07-12

## 2024-07-13 DIAGNOSIS — E78.2 MIXED HYPERLIPIDEMIA: ICD-10-CM

## 2024-07-15 RX ORDER — ATORVASTATIN CALCIUM 20 MG/1
20 TABLET, FILM COATED ORAL DAILY
Qty: 90 TABLET | Refills: 3 | Status: SHIPPED | OUTPATIENT
Start: 2024-07-15

## 2024-07-16 ENCOUNTER — TELEPHONE (OUTPATIENT)
Dept: GASTROENTEROLOGY | Facility: CLINIC | Age: 59
End: 2024-07-16
Payer: COMMERCIAL

## 2024-07-16 DIAGNOSIS — K76.0 FATTY LIVER: ICD-10-CM

## 2024-07-16 NOTE — TELEPHONE ENCOUNTER
Called patient to discuss results and plan per Dr Torres:  The MRI shows no liver mass.  He does have a tiny pancreas lesion and does need repeat MRI with MRCP in 1 year.  He does have fatty liver disease.  He needs to work on PARKER risk factors.  His HIDA scan was negative.  He needs to get hepatitis A vaccine shots.  His FibroScan showed no significant fibrosis.  He should get liver tests in about 6 months and see me back in 7 months.

## 2024-07-17 ENCOUNTER — APPOINTMENT (OUTPATIENT)
Dept: PAIN MEDICINE | Facility: CLINIC | Age: 59
End: 2024-07-17
Payer: COMMERCIAL

## 2024-07-24 DIAGNOSIS — J45.909 UNSPECIFIED ASTHMA, UNCOMPLICATED (HHS-HCC): ICD-10-CM

## 2024-07-24 RX ORDER — ALBUTEROL SULFATE 90 UG/1
AEROSOL, METERED RESPIRATORY (INHALATION)
Qty: 18 G | Refills: 3 | Status: SHIPPED | OUTPATIENT
Start: 2024-07-24

## 2024-07-31 ENCOUNTER — OFFICE VISIT (OUTPATIENT)
Dept: PAIN MEDICINE | Facility: CLINIC | Age: 59
End: 2024-07-31
Payer: COMMERCIAL

## 2024-07-31 VITALS
SYSTOLIC BLOOD PRESSURE: 129 MMHG | HEIGHT: 72 IN | RESPIRATION RATE: 18 BRPM | WEIGHT: 274 LBS | DIASTOLIC BLOOD PRESSURE: 84 MMHG | BODY MASS INDEX: 37.11 KG/M2 | HEART RATE: 106 BPM

## 2024-07-31 DIAGNOSIS — G89.29 CHRONIC BILATERAL LOW BACK PAIN WITH LEFT-SIDED SCIATICA: ICD-10-CM

## 2024-07-31 DIAGNOSIS — M54.42 CHRONIC BILATERAL LOW BACK PAIN WITH LEFT-SIDED SCIATICA: ICD-10-CM

## 2024-07-31 DIAGNOSIS — M54.16 LUMBAR RADICULITIS: Primary | ICD-10-CM

## 2024-07-31 PROCEDURE — 3074F SYST BP LT 130 MM HG: CPT | Performed by: ANESTHESIOLOGY

## 2024-07-31 PROCEDURE — 99214 OFFICE O/P EST MOD 30 MIN: CPT | Performed by: ANESTHESIOLOGY

## 2024-07-31 PROCEDURE — 99204 OFFICE O/P NEW MOD 45 MIN: CPT | Performed by: ANESTHESIOLOGY

## 2024-07-31 PROCEDURE — 3079F DIAST BP 80-89 MM HG: CPT | Performed by: ANESTHESIOLOGY

## 2024-07-31 PROCEDURE — 3008F BODY MASS INDEX DOCD: CPT | Performed by: ANESTHESIOLOGY

## 2024-07-31 PROCEDURE — 1036F TOBACCO NON-USER: CPT | Performed by: ANESTHESIOLOGY

## 2024-07-31 PROCEDURE — 3048F LDL-C <100 MG/DL: CPT | Performed by: ANESTHESIOLOGY

## 2024-07-31 RX ORDER — METHYLPREDNISOLONE 4 MG/1
TABLET ORAL
Qty: 21 TABLET | Refills: 0 | Status: SHIPPED | OUTPATIENT
Start: 2024-07-31 | End: 2024-08-07

## 2024-07-31 RX ORDER — GABAPENTIN 300 MG/1
CAPSULE ORAL
Qty: 90 CAPSULE | Refills: 0 | Status: SHIPPED | OUTPATIENT
Start: 2024-07-31

## 2024-07-31 ASSESSMENT — PAIN SCALES - GENERAL
PAINLEVEL_OUTOF10: 3
PAINLEVEL: 3

## 2024-07-31 ASSESSMENT — ENCOUNTER SYMPTOMS
GASTROINTESTINAL NEGATIVE: 1
CARDIOVASCULAR NEGATIVE: 1
NEUROLOGICAL NEGATIVE: 1
HEMATOLOGIC/LYMPHATIC NEGATIVE: 1
BACK PAIN: 1
RESPIRATORY NEGATIVE: 1
EYES NEGATIVE: 1
CONSTITUTIONAL NEGATIVE: 1
ENDOCRINE NEGATIVE: 1
PSYCHIATRIC NEGATIVE: 1

## 2024-07-31 ASSESSMENT — PAIN DESCRIPTION - DESCRIPTORS: DESCRIPTORS: PRESSURE

## 2024-07-31 ASSESSMENT — PATIENT HEALTH QUESTIONNAIRE - PHQ9
1. LITTLE INTEREST OR PLEASURE IN DOING THINGS: NOT AT ALL
2. FEELING DOWN, DEPRESSED OR HOPELESS: NOT AT ALL
SUM OF ALL RESPONSES TO PHQ9 QUESTIONS 1 AND 2: 0

## 2024-07-31 ASSESSMENT — PAIN - FUNCTIONAL ASSESSMENT: PAIN_FUNCTIONAL_ASSESSMENT: 0-10

## 2024-07-31 ASSESSMENT — LIFESTYLE VARIABLES: TOTAL SCORE: 5

## 2024-07-31 NOTE — PROGRESS NOTES
PAIN MANAGEMENT NEW PATIENT OFFICE NOTE    Date of Service: 7/31/2024    SUBJECTIVE    CHIEF COMPLAINT: LBP    HISTORY OF PRESENT ILLNESS    Shaheen Ambriz is a 59 y.o. male with PMH mild intermittent asthma, NIDDM2, factor V Leiden on ASA 81 mg, NAFLD, obesity who presents as new patient with LB pain.    Pt describes LBP >15 y without inciting trauma/incident. Pain acutely worsened 1 mo ago and is described as severe. Pain occasionally radiates to L posterior thigh. Pain is worst with standing, walking, lifting, prolonged sitting, which leaves him restless. Pain has tried Tylenol, NSAIDs, IcyHot, >6 w PT without sustained relief. Had injections in remote past, but unsure what kind. He does remember that these injections helped immensely.     Pt denies new-onset numbness, weakness, bowel/bladder incontinence.  Pt denies recent infection, allergy to Latex/iodine/contrast. Patient is currently taking the following blood thinner(s): N/A    REVIEW OF SYSTEMS  Review of Systems   Constitutional: Negative.    HENT: Negative.     Eyes: Negative.    Respiratory: Negative.     Cardiovascular: Negative.    Gastrointestinal: Negative.    Endocrine: Negative.    Musculoskeletal:  Positive for back pain.   Skin: Negative.    Neurological: Negative.    Hematological: Negative.    Psychiatric/Behavioral: Negative.         PAST MEDICAL HISTORY  Past Medical History:   Diagnosis Date    Anxiety     Asthma ([facility])     triggers: dust, weather changes-last use 9/2023    Diabetes mellitus (Multi)     A1C=7.1 10/12/23    Diverticulosis     multiple diverticulitis flares in past year    Factor V Leiden (Multi)     daily ASA, no hematologist, has been tx w/ Lovenox in past post op    GERD (gastroesophageal reflux disease)     Hyperlipidemia     Hypertension     Nephrolithiasis     last 2007    Sinusitis     r/t allergies    Syncope 09/2022    single episode, evaluated by Dr. Davey Garcia.  ECHO 9/12/22 WNL, Zio monitor WNL per  patient.  No recurrance of symptoms     Past Surgical History:   Procedure Laterality Date    KNEE SURGERY Right 06/2023    LITHOTRIPSY  2007    SHOULDER Left 06/2019    clavical decompression    SIGMOIDECTOMY  02/07/2024    UMBILICAL HERNIA REPAIR  12/2021    VASECTOMY  2006     Family History   Problem Relation Name Age of Onset    Pancreatic cancer Mother      No Known Problems Father      No Known Problems Brother      Hypertension Other grandparent        CURRENT MEDICATIONS  Current Outpatient Medications   Medication Sig Dispense Refill    albuterol 90 mcg/actuation inhaler INHALE 2 PUFFS INTO THE LUNGS EVERY 4 HOURS AS NEEDED FOR 90 DAYS 18 g 3    albuterol sulfate (Proair Digihaler) 90 mcg/actuation aero powdr breath act w/sensor inhaler Inhale 2 puffs every 4 hours.      aspirin 81 mg EC tablet Take 1 tablet (81 mg) by mouth once daily.      atorvastatin (Lipitor) 20 mg tablet Take 1 tablet (20 mg) by mouth once daily. 90 tablet 3    co-enzyme Q-10 30 mg capsule Take 1 capsule (30 mg) by mouth once daily.      FLUoxetine (PROzac) 40 mg capsule TAKE 1 CAPSULE BY MOUTH ONCE DAILY 90 capsule 1    glimepiride (Amaryl) 2 mg tablet Take 0.5 tablets (1 mg) by mouth once daily. as directed      loratadine (Claritin) 10 mg tablet Take 1 tablet (10 mg) by mouth once daily.      montelukast (Singulair) 10 mg tablet TAKE 1 TABLET BY MOUTH EVERY DAY IN THE EVENING 90 tablet 1    omeprazole OTC (PriLOSEC OTC) 20 mg EC tablet Take 1 capsule by mouth once daily.      traZODone (Desyrel) 50 mg tablet Take 1 tablet (50 mg) by mouth once daily at bedtime. 90 tablet 1    Wixela Inhub 250-50 mcg/dose diskus inhaler INHALE 1 PUFF BY MOUTH TWICE A DAY INHALATION TWICE A DAY 90 DAYS 180 each 2    diphenhydrAMINE (BENADryl) 50 mg tablet Take 1 tablet (50 mg) by mouth see administration instructions. Take 1 hour before procedure (Patient not taking: Reported on 7/31/2024) 1 tablet 0    famotidine (Pepcid) 20 mg tablet Take 1 tablet  (20 mg) by mouth once daily. 30 tablet 0    polyethylene glycol (Glycolax, Miralax) 17 gram packet Take 17 g by mouth once daily.      predniSONE (Deltasone) 50 mg tablet Take 1 tablet (50 mg) by mouth see administration instructions for 3 doses. Take 1 tablet 13 hours before, 7 hours before, and 1 hour before procedure. (Patient not taking: Reported on 7/31/2024) 3 tablet 0     No current facility-administered medications for this visit.       ALLERGIES AND DRUG REACTIONS  Allergies   Allergen Reactions    Dulaglutide Anxiety and GI Upset     Critically High    Iodinated Contrast Media Anaphylaxis, Hives and Itching    Metformin Hcl GI Upset    Theophylline Palpitations and GI Upset          OBJECTIVE  Visit Vitals  /84   Pulse 106   Resp 18   Ht 1.829 m (6')   Wt 124 kg (274 lb)   BMI 37.16 kg/m²   Smoking Status Never   BSA 2.51 m²       Last Recorded Pain Score (if available):                Physical Exam  Vitals and nursing note reviewed.     General: Sitting in chair, NAD  Head: NCAT  Eyes: Sclera/conjunctiva clear, EOMI, PERRL  Nose/mouth: MMM  CV: Good distal pulses  Lungs: Good/equal chest excursion  Abdomen: Soft, ND  Ext: No cyanosis/edema  MSK: L-spine alignment: unremarkable, BL paraspinal m NTTP, L-spine ROM: full    Neuro: AAOx3   Dermatome sensation to light touch  LEFT L1 (lower pelvis/upper thigh): WNL    RIGHT L1: WNL      LEFT L2 (upper thigh): WNL       RIGHT: L2:WNL      LEFT L3 (medial knee): WNL       RIGHT L3: WNL      LEFT L4 (superior medial malleolus): WNL       RIGHT L4: WNL      LEFT L5 (dorsal foot): WNL       RIGHT L5: WNL      LEFT S1 (lateral foot): WNL     RIGHT S1: WNL      LEFT S2 (popliteal fossa): WNL    RIGHT S2: WNL        Motor strength  LEFT L2 (hip flexion): 5/5   RIGHT L2: 5/5  LEFT L3 (knee extension): 5/5     RIGHT L3: 5/5  LEFT L4 (dorsiflexion): 5/5     RIGHT L4: 5/5  LEFT L5 (EHL extension): 5/5     RIGHT L5: 5/5  LEFT S1 (plantarflexion): 5/5     RIGHT S1:  5/5  LEFT S2 (knee flexion): 5/5      RIGHT S2: 5/5    Special testing  DTR unremarkable  Seated slump test +LLE  Clonus: neg BL  Babinski: neg BL    Psych: affect nl  Skin: no rash/lesions      REVIEW OF LABORATORY DATA  I have reviewed the following lab results:  WBC   Date Value Ref Range Status   06/29/2024 11.6 (H) 4.4 - 11.3 x10*3/uL Final     RBC   Date Value Ref Range Status   06/29/2024 5.33 4.50 - 5.90 x10*6/uL Final     Hemoglobin   Date Value Ref Range Status   06/29/2024 13.9 13.5 - 17.5 g/dL Final     Hematocrit   Date Value Ref Range Status   06/29/2024 45.5 41.0 - 52.0 % Final     MCV   Date Value Ref Range Status   06/29/2024 85 80 - 100 fL Final     MCH   Date Value Ref Range Status   06/29/2024 26.1 26.0 - 34.0 pg Final     MCHC   Date Value Ref Range Status   06/29/2024 30.5 (L) 32.0 - 36.0 g/dL Final     RDW   Date Value Ref Range Status   06/29/2024 14.8 (H) 11.5 - 14.5 % Final     Platelets   Date Value Ref Range Status   06/29/2024 457 (H) 150 - 450 x10*3/uL Final     MPV   Date Value Ref Range Status   07/24/2023 8.7 7.0 - 12.6 CU Final     Sodium   Date Value Ref Range Status   06/29/2024 140 136 - 145 mmol/L Final     Potassium   Date Value Ref Range Status   06/29/2024 4.5 3.5 - 5.3 mmol/L Final     Bicarbonate   Date Value Ref Range Status   06/29/2024 24 21 - 32 mmol/L Final     Urea Nitrogen   Date Value Ref Range Status   06/29/2024 16 6 - 23 mg/dL Final     Calcium   Date Value Ref Range Status   06/29/2024 9.4 8.6 - 10.3 mg/dL Final     Protime   Date Value Ref Range Status   02/09/2019 9.8 9.3 - 12.7 SEC Final     INR   Date Value Ref Range Status   02/09/2019 0.9 0.86 - 1.16 Final     Comment:     INR Therapeutic Range: 2.0-3.5  Performed at [address] [address]           REVIEW OF RADIOLOGY   I have reviewed the following:  Radiology Studies           N/A       ASSESSMENT & PLAN  Shaheen Ambriz is a 59 y.o. old male with PMH  mild intermittent asthma, NIDDM2,  factor V Leiden on ASA 81 mg, NAFLD, obesity who presents as new patient with LB pain    1) LBP  ->15 y LBP radiating to L posterior thigh with +LLE seated slump  -Refractive to yrs of conservative tx including Tylenol, NSAIDs, IcyHot, >6 w PT  -MRI L-spine to eval neuraxial pathology  -MDP, gabapentin 300 mg TID titration  -F/U 1 mo                   Violeta Rivera MD  Anesthesiologist & Interventional Pain Physician   [facility] [place]  O: [phone]  F: [phone]  11:43 AM  07/31/24

## 2024-08-01 ENCOUNTER — APPOINTMENT (OUTPATIENT)
Dept: GASTROENTEROLOGY | Facility: CLINIC | Age: 59
End: 2024-08-01
Payer: COMMERCIAL

## 2024-08-05 DIAGNOSIS — F32.A DEPRESSION, UNSPECIFIED DEPRESSION TYPE: ICD-10-CM

## 2024-08-06 ENCOUNTER — APPOINTMENT (OUTPATIENT)
Dept: ENDOCRINOLOGY | Facility: CLINIC | Age: 59
End: 2024-08-06
Payer: COMMERCIAL

## 2024-08-06 VITALS — BODY MASS INDEX: 37.16 KG/M2 | WEIGHT: 274 LBS | SYSTOLIC BLOOD PRESSURE: 132 MMHG | DIASTOLIC BLOOD PRESSURE: 74 MMHG

## 2024-08-06 DIAGNOSIS — I10 BENIGN ESSENTIAL HTN: ICD-10-CM

## 2024-08-06 DIAGNOSIS — E78.2 MIXED HYPERLIPIDEMIA: ICD-10-CM

## 2024-08-06 DIAGNOSIS — E11.9 TYPE 2 DIABETES MELLITUS WITHOUT COMPLICATION, WITHOUT LONG-TERM CURRENT USE OF INSULIN (MULTI): Primary | ICD-10-CM

## 2024-08-06 PROCEDURE — 3075F SYST BP GE 130 - 139MM HG: CPT | Performed by: INTERNAL MEDICINE

## 2024-08-06 PROCEDURE — 3048F LDL-C <100 MG/DL: CPT | Performed by: INTERNAL MEDICINE

## 2024-08-06 PROCEDURE — 3078F DIAST BP <80 MM HG: CPT | Performed by: INTERNAL MEDICINE

## 2024-08-06 PROCEDURE — 99214 OFFICE O/P EST MOD 30 MIN: CPT | Performed by: INTERNAL MEDICINE

## 2024-08-06 RX ORDER — TRAZODONE HYDROCHLORIDE 50 MG/1
50 TABLET ORAL NIGHTLY
Qty: 90 TABLET | Refills: 1 | Status: SHIPPED | OUTPATIENT
Start: 2024-08-06

## 2024-08-06 NOTE — PROGRESS NOTES
Patient ID: Shaheen Ambriz is a 59 y.o. male who presents for Follow-up.  HPI  he patient comes in for follow up.    He was last seen October 13, 2023.    He has type 2 diabetes with no complications hypertension hyperlipidemia.    He had tried metformin ER but did not tolerate it.    He had been on Jardiance but did not restart it.    He has been intolerant to Trulicity because of GI side effects.    He had been on glipizide ER 10 mg which caused a lot of hypoglycemia.    We added in glimepiride 2 mg which he has decreased to 1 mg.    Since last being seen he had sigmoid resection for diverticulitis.    He is also been diagnosed with fatty liver and over the last couple weeks has adjusted his diet.    He has not been consistently checking his blood sugars.    He is complaining of ED but his libido is intact.  He did have a low normal total testosterone but a normal free.    Physically he has no other complaints.    ROS  Comprehensive review of systems is negative.    Objective   Physical Exam  Visit Vitals  /74      Vitals:    08/06/24 1556   Weight: 124 kg (274 lb)      Body mass index is 37.16 kg/m².      Weight 274 up 13 pounds    ENT normal. No adenopathy  Fundi normal  Thyroid palpable and normal. No nodules  Chest clear to auscultation  Heart sounds are normal  Abdomen nontender. Bowel sounds normal. No organomegaly  Feet are okay  Normal vibration and monofilament sensation normal pulses, no lesions    Current Outpatient Medications   Medication Sig Dispense Refill    albuterol 90 mcg/actuation inhaler INHALE 2 PUFFS INTO THE LUNGS EVERY 4 HOURS AS NEEDED FOR 90 DAYS 18 g 3    albuterol sulfate (Proair Digihaler) 90 mcg/actuation aero powdr breath act w/sensor inhaler Inhale 2 puffs every 4 hours.      aspirin 81 mg EC tablet Take 1 tablet (81 mg) by mouth once daily.      atorvastatin (Lipitor) 20 mg tablet Take 1 tablet (20 mg) by mouth once daily. 90 tablet 3    co-enzyme Q-10 30 mg capsule Take 1  capsule (30 mg) by mouth once daily.      FLUoxetine (PROzac) 40 mg capsule TAKE 1 CAPSULE BY MOUTH ONCE DAILY 90 capsule 1    gabapentin (Neurontin) 300 mg capsule Take 1 capsule nightly for 1 week then twice a day for 1 week then three times a day thereafter 90 capsule 0    glimepiride (Amaryl) 2 mg tablet Take 0.5 tablets (1 mg) by mouth once daily. as directed      loratadine (Claritin) 10 mg tablet Take 1 tablet (10 mg) by mouth once daily.      methylPREDNISolone (Medrol Dospak) 4 mg tablets Follow schedule on package instructions 21 tablet 0    montelukast (Singulair) 10 mg tablet TAKE 1 TABLET BY MOUTH EVERY DAY IN THE EVENING 90 tablet 1    omeprazole OTC (PriLOSEC OTC) 20 mg EC tablet Take 1 capsule by mouth once daily.      traZODone (Desyrel) 50 mg tablet TAKE 1 TABLET (50 MG) BY MOUTH ONCE DAILY AT BEDTIME. 90 tablet 1    Wixela Inhub 250-50 mcg/dose diskus inhaler INHALE 1 PUFF BY MOUTH TWICE A DAY INHALATION TWICE A DAY 90 DAYS 180 each 2     No current facility-administered medications for this visit.       Assessment/Plan     1.  Type 2 diabetes  2.  Hypertension  3.  Hyperlipidemia    He is going to start on the Jardiance 25 mg.    He will adjust or discontinue the glimepiride if he starts getting low blood sugars.    He will work on diet and exercise.    Would consider Ozempic however given his GI issues he may not tolerate it.    He will follow-up with me in 3 months sooner as needed.

## 2024-08-15 ENCOUNTER — HOSPITAL ENCOUNTER (OUTPATIENT)
Dept: RADIOLOGY | Facility: CLINIC | Age: 59
Discharge: HOME | End: 2024-08-15
Payer: COMMERCIAL

## 2024-08-15 DIAGNOSIS — M54.16 LUMBAR RADICULITIS: ICD-10-CM

## 2024-08-15 PROCEDURE — 72148 MRI LUMBAR SPINE W/O DYE: CPT | Performed by: RADIOLOGY

## 2024-08-15 PROCEDURE — 72148 MRI LUMBAR SPINE W/O DYE: CPT

## 2024-08-19 DIAGNOSIS — E11.65 TYPE 2 DIABETES MELLITUS WITH HYPERGLYCEMIA (MULTI): ICD-10-CM

## 2024-08-19 RX ORDER — GLIMEPIRIDE 2 MG/1
2 TABLET ORAL DAILY
Qty: 90 TABLET | Refills: 3 | Status: SHIPPED | OUTPATIENT
Start: 2024-08-19

## 2024-08-23 ENCOUNTER — APPOINTMENT (OUTPATIENT)
Dept: PAIN MEDICINE | Facility: CLINIC | Age: 59
End: 2024-08-23
Payer: COMMERCIAL

## 2024-08-25 ENCOUNTER — HOSPITAL ENCOUNTER (EMERGENCY)
Facility: HOSPITAL | Age: 59
Discharge: HOME | End: 2024-08-25
Attending: EMERGENCY MEDICINE
Payer: COMMERCIAL

## 2024-08-25 ENCOUNTER — APPOINTMENT (OUTPATIENT)
Dept: RADIOLOGY | Facility: HOSPITAL | Age: 59
End: 2024-08-25
Payer: COMMERCIAL

## 2024-08-25 ENCOUNTER — APPOINTMENT (OUTPATIENT)
Dept: CARDIOLOGY | Facility: HOSPITAL | Age: 59
End: 2024-08-25
Payer: COMMERCIAL

## 2024-08-25 VITALS
TEMPERATURE: 97.5 F | RESPIRATION RATE: 18 BRPM | HEART RATE: 93 BPM | HEIGHT: 72 IN | WEIGHT: 271.17 LBS | DIASTOLIC BLOOD PRESSURE: 76 MMHG | SYSTOLIC BLOOD PRESSURE: 136 MMHG | OXYGEN SATURATION: 96 % | BODY MASS INDEX: 36.73 KG/M2

## 2024-08-25 DIAGNOSIS — R10.9 ABDOMINAL PAIN, UNSPECIFIED ABDOMINAL LOCATION: Primary | ICD-10-CM

## 2024-08-25 DIAGNOSIS — R11.2 NAUSEA AND VOMITING, UNSPECIFIED VOMITING TYPE: ICD-10-CM

## 2024-08-25 LAB
ALBUMIN SERPL-MCNC: 4 G/DL (ref 3.5–5)
ALP BLD-CCNC: 126 U/L (ref 35–125)
ALT SERPL-CCNC: 51 U/L (ref 5–40)
ANION GAP SERPL CALC-SCNC: 15 MMOL/L
APPEARANCE UR: CLEAR
AST SERPL-CCNC: 25 U/L (ref 5–40)
BASOPHILS # BLD AUTO: 0.03 X10*3/UL (ref 0–0.1)
BASOPHILS NFR BLD AUTO: 0.3 %
BILIRUB SERPL-MCNC: 0.4 MG/DL (ref 0.1–1.2)
BILIRUB UR STRIP.AUTO-MCNC: NEGATIVE MG/DL
BUN SERPL-MCNC: 13 MG/DL (ref 8–25)
CALCIUM SERPL-MCNC: 8.7 MG/DL (ref 8.5–10.4)
CHLORIDE SERPL-SCNC: 104 MMOL/L (ref 97–107)
CO2 SERPL-SCNC: 21 MMOL/L (ref 24–31)
COLOR UR: YELLOW
CREAT SERPL-MCNC: 0.9 MG/DL (ref 0.4–1.6)
EGFRCR SERPLBLD CKD-EPI 2021: >90 ML/MIN/1.73M*2
EOSINOPHIL # BLD AUTO: 0.23 X10*3/UL (ref 0–0.7)
EOSINOPHIL NFR BLD AUTO: 2.2 %
ERYTHROCYTE [DISTWIDTH] IN BLOOD BY AUTOMATED COUNT: 14.9 % (ref 11.5–14.5)
GLUCOSE SERPL-MCNC: 162 MG/DL (ref 65–99)
GLUCOSE UR STRIP.AUTO-MCNC: NORMAL MG/DL
HCT VFR BLD AUTO: 44.9 % (ref 41–52)
HGB BLD-MCNC: 13.9 G/DL (ref 13.5–17.5)
IMM GRANULOCYTES # BLD AUTO: 0.03 X10*3/UL (ref 0–0.7)
IMM GRANULOCYTES NFR BLD AUTO: 0.3 % (ref 0–0.9)
KETONES UR STRIP.AUTO-MCNC: NEGATIVE MG/DL
LEUKOCYTE ESTERASE UR QL STRIP.AUTO: NEGATIVE
LIPASE SERPL-CCNC: 139 U/L (ref 16–63)
LYMPHOCYTES # BLD AUTO: 2.78 X10*3/UL (ref 1.2–4.8)
LYMPHOCYTES NFR BLD AUTO: 26.9 %
MAGNESIUM SERPL-MCNC: 1.8 MG/DL (ref 1.6–3.1)
MCH RBC QN AUTO: 25.5 PG (ref 26–34)
MCHC RBC AUTO-ENTMCNC: 31 G/DL (ref 32–36)
MCV RBC AUTO: 82 FL (ref 80–100)
MONOCYTES # BLD AUTO: 0.56 X10*3/UL (ref 0.1–1)
MONOCYTES NFR BLD AUTO: 5.4 %
MUCOUS THREADS #/AREA URNS AUTO: NORMAL /LPF
NEUTROPHILS # BLD AUTO: 6.71 X10*3/UL (ref 1.2–7.7)
NEUTROPHILS NFR BLD AUTO: 64.9 %
NITRITE UR QL STRIP.AUTO: NEGATIVE
NRBC BLD-RTO: 0 /100 WBCS (ref 0–0)
PH UR STRIP.AUTO: 6 [PH]
PLATELET # BLD AUTO: 412 X10*3/UL (ref 150–450)
POTASSIUM SERPL-SCNC: 3.7 MMOL/L (ref 3.4–5.1)
PROT SERPL-MCNC: 7 G/DL (ref 5.9–7.9)
PROT UR STRIP.AUTO-MCNC: ABNORMAL MG/DL
RBC # BLD AUTO: 5.45 X10*6/UL (ref 4.5–5.9)
RBC # UR STRIP.AUTO: NEGATIVE /UL
RBC #/AREA URNS AUTO: NORMAL /HPF
SARS-COV-2 RNA RESP QL NAA+PROBE: NOT DETECTED
SODIUM SERPL-SCNC: 140 MMOL/L (ref 133–145)
SP GR UR STRIP.AUTO: 1.02
TROPONIN T SERPL-MCNC: 10 NG/L
TROPONIN T SERPL-MCNC: 10 NG/L
UROBILINOGEN UR STRIP.AUTO-MCNC: ABNORMAL MG/DL
WBC # BLD AUTO: 10.3 X10*3/UL (ref 4.4–11.3)
WBC #/AREA URNS AUTO: NORMAL /HPF

## 2024-08-25 PROCEDURE — 36415 COLL VENOUS BLD VENIPUNCTURE: CPT | Performed by: PHYSICIAN ASSISTANT

## 2024-08-25 PROCEDURE — 2500000004 HC RX 250 GENERAL PHARMACY W/ HCPCS (ALT 636 FOR OP/ED): Performed by: EMERGENCY MEDICINE

## 2024-08-25 PROCEDURE — 96375 TX/PRO/DX INJ NEW DRUG ADDON: CPT

## 2024-08-25 PROCEDURE — 84484 ASSAY OF TROPONIN QUANT: CPT | Performed by: EMERGENCY MEDICINE

## 2024-08-25 PROCEDURE — 93005 ELECTROCARDIOGRAM TRACING: CPT

## 2024-08-25 PROCEDURE — 83690 ASSAY OF LIPASE: CPT | Performed by: EMERGENCY MEDICINE

## 2024-08-25 PROCEDURE — 96374 THER/PROPH/DIAG INJ IV PUSH: CPT

## 2024-08-25 PROCEDURE — 83735 ASSAY OF MAGNESIUM: CPT | Performed by: PHYSICIAN ASSISTANT

## 2024-08-25 PROCEDURE — 36415 COLL VENOUS BLD VENIPUNCTURE: CPT | Performed by: EMERGENCY MEDICINE

## 2024-08-25 PROCEDURE — 71045 X-RAY EXAM CHEST 1 VIEW: CPT | Performed by: RADIOLOGY

## 2024-08-25 PROCEDURE — 96361 HYDRATE IV INFUSION ADD-ON: CPT

## 2024-08-25 PROCEDURE — 87635 SARS-COV-2 COVID-19 AMP PRB: CPT | Performed by: EMERGENCY MEDICINE

## 2024-08-25 PROCEDURE — 74176 CT ABD & PELVIS W/O CONTRAST: CPT

## 2024-08-25 PROCEDURE — 99285 EMERGENCY DEPT VISIT HI MDM: CPT | Mod: 25

## 2024-08-25 PROCEDURE — 85025 COMPLETE CBC W/AUTO DIFF WBC: CPT | Performed by: PHYSICIAN ASSISTANT

## 2024-08-25 PROCEDURE — 81001 URINALYSIS AUTO W/SCOPE: CPT | Performed by: PHYSICIAN ASSISTANT

## 2024-08-25 PROCEDURE — 71045 X-RAY EXAM CHEST 1 VIEW: CPT

## 2024-08-25 PROCEDURE — 80053 COMPREHEN METABOLIC PANEL: CPT | Performed by: PHYSICIAN ASSISTANT

## 2024-08-25 PROCEDURE — 74176 CT ABD & PELVIS W/O CONTRAST: CPT | Performed by: STUDENT IN AN ORGANIZED HEALTH CARE EDUCATION/TRAINING PROGRAM

## 2024-08-25 RX ORDER — KETOROLAC TROMETHAMINE 30 MG/ML
15 INJECTION, SOLUTION INTRAMUSCULAR; INTRAVENOUS ONCE
Status: COMPLETED | OUTPATIENT
Start: 2024-08-25 | End: 2024-08-25

## 2024-08-25 RX ORDER — FAMOTIDINE 10 MG/ML
20 INJECTION INTRAVENOUS ONCE
Status: COMPLETED | OUTPATIENT
Start: 2024-08-25 | End: 2024-08-25

## 2024-08-25 RX ORDER — FAMOTIDINE 20 MG/1
20 TABLET, FILM COATED ORAL 2 TIMES DAILY
Qty: 20 TABLET | Refills: 0 | Status: SHIPPED | OUTPATIENT
Start: 2024-08-25 | End: 2024-09-04

## 2024-08-25 RX ORDER — ONDANSETRON 4 MG/1
4 TABLET, ORALLY DISINTEGRATING ORAL EVERY 8 HOURS PRN
Qty: 10 TABLET | Refills: 0 | Status: SHIPPED | OUTPATIENT
Start: 2024-08-25 | End: 2024-08-28

## 2024-08-25 RX ORDER — ONDANSETRON HYDROCHLORIDE 2 MG/ML
4 INJECTION, SOLUTION INTRAVENOUS ONCE
Status: COMPLETED | OUTPATIENT
Start: 2024-08-25 | End: 2024-08-25

## 2024-08-25 ASSESSMENT — PAIN SCALES - GENERAL
PAINLEVEL_OUTOF10: 3
PAINLEVEL_OUTOF10: 0 - NO PAIN
PAINLEVEL_OUTOF10: 0 - NO PAIN
PAINLEVEL_OUTOF10: 3

## 2024-08-25 ASSESSMENT — PAIN - FUNCTIONAL ASSESSMENT: PAIN_FUNCTIONAL_ASSESSMENT: 0-10

## 2024-08-25 NOTE — ED PROVIDER NOTES
HPI   Chief Complaint   Patient presents with    Flu Symptoms     Flu symptomslast week, was seen at [facility] on Friday for it, still feeling weak and run down. Pt a type 2 diabetic, had low potassium and dehydration  when seen friday       HPI        Patient History   Past Medical History:   Diagnosis Date    Anxiety     Asthma ([facility])     triggers: dust, weather changes-last use 9/2023    Diabetes mellitus (Multi)     A1C=7.1 10/12/23    Diverticulosis     multiple diverticulitis flares in past year    Factor V Leiden (Multi)     daily ASA, no hematologist, has been tx w/ Lovenox in past post op    GERD (gastroesophageal reflux disease)     Hyperlipidemia     Hypertension     Nephrolithiasis     last 2007    Sinusitis     r/t allergies    Syncope 09/2022    single episode, evaluated by Dr. Davey Garcia.  ECHO 9/12/22 WNL, Zio monitor WNL per patient.  No recurrance of symptoms     Past Surgical History:   Procedure Laterality Date    KNEE SURGERY Right 06/2023    LITHOTRIPSY  2007    SHOULDER Left 06/2019    clavical decompression    SIGMOIDECTOMY  02/07/2024    UMBILICAL HERNIA REPAIR  12/2021    VASECTOMY  2006     Family History   Problem Relation Name Age of Onset    Pancreatic cancer Mother      No Known Problems Father      No Known Problems Brother      Hypertension Other grandparent      Social History     Tobacco Use    Smoking status: Never    Smokeless tobacco: Never   Vaping Use    Vaping status: Never Used   Substance Use Topics    Alcohol use: Yes     Comment: pt drinks an ocassional glass of wine    Drug use: Never       Physical Exam   ED Triage Vitals   Temperature Heart Rate Respirations BP   08/25/24 1728 08/25/24 1728 08/25/24 1728 08/25/24 1731   36.4 °C (97.5 °F) (!) 106 18 135/83      Pulse Ox Temp Source Heart Rate Source Patient Position   08/25/24 1728 08/25/24 1728 08/25/24 1728 08/25/24 1728   97 % Oral Monitor Sitting      BP Location FiO2 (%)     08/25/24 1728 --     Right arm         Physical Exam      ED Course & MDM   Diagnoses as of 08/25/24 2158   Abdominal pain, unspecified abdominal location   Nausea and vomiting, unspecified vomiting type                 No data recorded     [place] Coma Scale Score: 15 (08/25/24 1729 : Miladis Purvis, EMT)                           Medical Decision Making    The patient is a 59-year-old male presenting to the emergency department for evaluation of generalized malaise, fatigue, nausea, vomiting and diarrhea, and abdominal pain.  The patient states that he felt ill last week.  He states that 5 to 6 days ago he developed some vague abdominal pain.  He had some nausea, vomiting and diarrhea.  He states he stayed home from work for 2 days because of his symptoms.  He states he tried to go back to work 3 days ago but he was sent home from work because he still was not feeling well.  He went to urgent care at that time and he did have IV fluids there.  He states that they also checked his labs and told him that his potassium was low.  He they did give him something for potassium.  He states that he still does not feel well.  He denies any headache or visual changes.  He denies any chest pain or shortness of breath.  No palpitations.  No diaphoresis.  No fever or chills.  No focal weakness or numbness.  No urinary complaints.  He states that he does have nausea but he has not vomited in the past day or 2.  He has had a few loose stools in the past 24 hours.  No blood in them.  No sick contacts or recent travel.  No recent antibiotic use.  He states that he does have a history of sigmoid diverticulitis.  He states that he had a resection performed in February 2024 because of it.  No other abdominal or pelvic surgeries.  All pertinent positives and negatives are recorded above.  All other systems reviewed and otherwise negative.  Vital signs with diastolic hypertension but otherwise within normal limits.  Physical exam with a well-nourished well-developed  male in no acute distress.  HEENT exam with dry mucous membranes but otherwise unremarkable.  He has no evidence of airway compromise or respiratory distress.  He does have some mild diffuse abdominal palpation.  No rebound or guarding.  No palpable masses.  No localization of the pain.  No flank pain with percussion or palpation.  He does not have any gross motor, neurologic or vascular deficits on exam.  Pulses are equal bilaterally.      EKG with normal sinus rhythm at 76 bpm, normal axis, normal voltage, normal ST segment, normal T waves      IV Pepcid, IV Zofran, IV Toradol and IV fluids ordered.      Diagnostic labs with mild electrolyte imbalance, mildly elevated ALT, mildly elevated lipase but otherwise unremarkable.      Initial troponin T 10. Repeat trop T 10      CT abdomen pelvis wo IV contrast   Final Result   1. No acute findings in the abdomen and pelvis.   2. Diffuse hepatic steatosis without focal lesion.   3. Multiple nonobstructing renal calculi.        Signed by: Acosta Akers 8/25/2024 7:00 PM   Dictation workstation:   [id number]      XR chest 1 view   Final Result   1. No acute cardiopulmonary process.        MACRO:   None.        Signed by: Breonna Gaines 8/25/2024 6:33 PM   Dictation workstation:   [id number]           The patient does not have any evidence of ischemia on EKG or cardiac enzymes.  No events on telemetry.  He does not have any evidence of airway compromise or respiratory distress on exam.  Chest x-ray without acute process.  No evidence of pneumonia or pneumothorax.  No evidence of CHF.  No widening of medial mediastinum.  He does have equal pulses bilaterally.  CT abdomen pelvis with no evidence of acute findings in the abdomen and pelvis.  No evidence of acute pancreatitis, cholecystitis, choledocholithiasis, diverticulitis, obstruction and/or appendicitis.  He does have diffuse hepatic steatosis but no focal lesions.  He has multiple nonobstructing renal calculi but  no ureteral stone.  The patient does have a mildly elevated lipase on diagnostic labs but no evidence of pancreatitis on the CT imaging.  He does not have any evidence of hemodynamic instability.  He did not have any evidence of hypokalemia.      The patient was released in good condition.  He was instructed to follow-up with his primary care physician within 1 to 2 days for further management of his current symptoms.  He was also given a referral to gastroenterology.  He was given a prescription for Pepcid and Zofran.  He will return to the emergency department sooner with worsening of symptoms or onset of new symptoms.          Impression/diagnosis  Malaise and fatigue  Nausea, vomiting, diarrhea  Diastolic hypertension  Abdominal pain, diffuse      I independently reviewed the results of the EKG and diagnostic labs      I reviewed the results of the diagnostic labs and diagnostic imaging.  Formal radiology reading was completed by the radiologist      Procedure  Procedures     Ruth Ennis MD  08/25/24 4736       Ruth Ennis MD  08/25/24 0884

## 2024-08-25 NOTE — Clinical Note
Shaheen Ambriz was seen and treated in our emergency department on 8/25/2024.  He may return to work on 08/27/2024.       If you have any questions or concerns, please don't hesitate to call.      Ruth Ennis MD

## 2024-08-26 NOTE — DISCHARGE INSTRUCTIONS
Follow-up with your primary care physician within 1 to 2 days for further management of your current symptoms.      Follow-up with gastroenterology within 1 week      Return to the emergency department sooner with worsening of symptoms or onset of new symptoms

## 2024-08-27 ENCOUNTER — APPOINTMENT (OUTPATIENT)
Dept: PRIMARY CARE | Facility: CLINIC | Age: 59
End: 2024-08-27
Payer: COMMERCIAL

## 2024-08-27 ENCOUNTER — OFFICE VISIT (OUTPATIENT)
Dept: PRIMARY CARE | Facility: CLINIC | Age: 59
End: 2024-08-27
Payer: COMMERCIAL

## 2024-08-27 VITALS
OXYGEN SATURATION: 97 % | WEIGHT: 274 LBS | HEART RATE: 82 BPM | BODY MASS INDEX: 37.11 KG/M2 | DIASTOLIC BLOOD PRESSURE: 70 MMHG | TEMPERATURE: 98.1 F | SYSTOLIC BLOOD PRESSURE: 128 MMHG | RESPIRATION RATE: 16 BRPM | HEIGHT: 72 IN

## 2024-08-27 DIAGNOSIS — E11.9 TYPE 2 DIABETES MELLITUS WITHOUT COMPLICATION, WITHOUT LONG-TERM CURRENT USE OF INSULIN (MULTI): Primary | ICD-10-CM

## 2024-08-27 DIAGNOSIS — R11.2 NAUSEA AND VOMITING, UNSPECIFIED VOMITING TYPE: ICD-10-CM

## 2024-08-27 LAB
ATRIAL RATE: 76 BPM
P AXIS: 48 DEGREES
P OFFSET: 192 MS
P ONSET: 137 MS
PR INTERVAL: 170 MS
Q ONSET: 222 MS
QRS COUNT: 13 BEATS
QRS DURATION: 82 MS
QT INTERVAL: 384 MS
QTC CALCULATION(BAZETT): 432 MS
QTC FREDERICIA: 415 MS
R AXIS: 9 DEGREES
T AXIS: 45 DEGREES
T OFFSET: 414 MS
VENTRICULAR RATE: 76 BPM

## 2024-08-27 PROCEDURE — 3048F LDL-C <100 MG/DL: CPT | Performed by: FAMILY MEDICINE

## 2024-08-27 PROCEDURE — 99213 OFFICE O/P EST LOW 20 MIN: CPT | Performed by: FAMILY MEDICINE

## 2024-08-27 PROCEDURE — 3008F BODY MASS INDEX DOCD: CPT | Performed by: FAMILY MEDICINE

## 2024-08-27 PROCEDURE — 3078F DIAST BP <80 MM HG: CPT | Performed by: FAMILY MEDICINE

## 2024-08-27 PROCEDURE — 3074F SYST BP LT 130 MM HG: CPT | Performed by: FAMILY MEDICINE

## 2024-08-27 RX ORDER — GLIPIZIDE 10 MG/1
TABLET, FILM COATED, EXTENDED RELEASE ORAL EVERY 24 HOURS
COMMUNITY
Start: 2023-04-28

## 2024-08-27 RX ORDER — BLOOD-GLUCOSE SENSOR
EACH MISCELLANEOUS
COMMUNITY
Start: 2024-05-20

## 2024-08-27 ASSESSMENT — ENCOUNTER SYMPTOMS
LOSS OF SENSATION IN FEET: 0
DEPRESSION: 0
OCCASIONAL FEELINGS OF UNSTEADINESS: 0

## 2024-08-27 ASSESSMENT — COLUMBIA-SUICIDE SEVERITY RATING SCALE - C-SSRS
1. IN THE PAST MONTH, HAVE YOU WISHED YOU WERE DEAD OR WISHED YOU COULD GO TO SLEEP AND NOT WAKE UP?: NO
6. HAVE YOU EVER DONE ANYTHING, STARTED TO DO ANYTHING, OR PREPARED TO DO ANYTHING TO END YOUR LIFE?: NO
2. HAVE YOU ACTUALLY HAD ANY THOUGHTS OF KILLING YOURSELF?: NO

## 2024-08-27 ASSESSMENT — PATIENT HEALTH QUESTIONNAIRE - PHQ9
SUM OF ALL RESPONSES TO PHQ9 QUESTIONS 1 AND 2: 0
2. FEELING DOWN, DEPRESSED OR HOPELESS: NOT AT ALL
1. LITTLE INTEREST OR PLEASURE IN DOING THINGS: NOT AT ALL

## 2024-08-27 ASSESSMENT — PAIN SCALES - GENERAL: PAINLEVEL: 0-NO PAIN

## 2024-08-27 NOTE — PROGRESS NOTES
Subjective   Patient ID: Shaheen Ambriz is a 59 y.o. male who presents for Follow-up (Pt is here for a ER follow up for GI issues. Pt reports going to the er twice for the issue.).    HPI he started with nausea the middle of last week followed by diarrhea and this got bad enough that he went to the ER on Friday.  He was given IVF and potassium and released.  He returned to the ER on Sunday due to recurrence of symptoms.  Given more fluids and CT scan.   CT showed no acute changes.  Chest xr wnl.     Review of Systemssee HPI    Objective   /70 (BP Location: Left arm, Patient Position: Sitting, BP Cuff Size: Large adult)   Pulse 82   Temp 36.7 °C (98.1 °F) (Temporal)   Resp 16   Ht 1.829 m (6')   Wt 124 kg (274 lb)   SpO2 97%   BMI 37.16 kg/m²     Physical Exam  Constitutional:       General: He is not in acute distress.     Appearance: Normal appearance.   Cardiovascular:      Rate and Rhythm: Normal rate and regular rhythm.      Heart sounds: No murmur heard.  Pulmonary:      Breath sounds: Normal breath sounds. No wheezing.   Neurological:      Mental Status: He is alert.         Assessment/Plan   Problem List Items Addressed This Visit             ICD-10-CM    Type 2 diabetes mellitus without complication, without long-term current use of insulin (Multi) - Primary E11.9     Other Visit Diagnoses         Codes    Nausea and vomiting, unspecified vomiting type     R11.2        Currently improved.  He will continue current fluid intake and follow up with GI near future.  Call sooner if any increased symptoms.  Reviewed er noted.

## 2024-09-05 NOTE — DISCHARGE SUMMARY
Discharge Diagnosis  Diverticulitis    Issues Requiring Follow-Up  Patient will follow up with surgeon, results will be reviewed     Test Results Pending At Discharge  Pending Labs       Order Current Status    Surgical Pathology Exam In process            Hospital Course  58 yr old male with history of diverticulitis s/p Robot Assisted Laparoscopic Sigmoid Colon Resection  on 2/7/24 with Dr. Walton. Please see operative report for full details. Patient tolerated the procedure well and recovered briefly in PACU before being transitioned to regular nursing floor. Post-op course was uncomplicated. Diet was advanced as tolerated.  IV medication transitioned to oral as diet advanced. On the day of discharge, the pt was tolerating a diet, pain was controlled on PO pain medication, and they were ambulating and voiding spontaneously. Pt discharged to home on 2/10/24 in stable condition with instructions to follow up as outpatient.     /85 (BP Location: Left arm, Patient Position: Sitting)   Pulse 93   Temp 36.5 °C (97.7 °F) (Temporal)   Resp 17   Ht 1.829 m (6')   Wt 115 kg (254 lb 10.1 oz)   SpO2 95%   BMI 34.53 kg/m²       Pertinent Physical Exam At Time of Discharge  PE:  Constitutional: A&Ox3, calm and cooperative, NAD  Eyes: clear sclera   ENMT: Moist mucous membranes  Head/Neck: Neck supple  Cardiovascular: Normal rate and regular rhythm.   Respiratory/Thorax: CTAB, . Good symmetric chest expansion.   Gastrointestinal: Abdomen slightly distended, soft, appropriately tender, no peritoneal signs, laparotomy sites c/d/i, well approximate with Dermabond, no erythema or drainage    Genitourinary: Voiding independently   Musculoskeletal: ROM intact  Extremities: No peripheral edema  Neurological: A&Ox3  Psychological: Appropriate mood and behavior  Skin: Warm and dry      Home Medications     Medication List      START taking these medications     acetaminophen 325 mg tablet; Commonly known as: Tylenol; Take 2  tablets   (650 mg) by mouth every 6 hours for 14 days.   ibuprofen 600 mg tablet; Take 1 tablet (600 mg) by mouth every 6 hours   if needed for mild pain (1 - 3) for up to 14 days.   oxyCODONE 5 mg immediate release tablet; Commonly known as: Roxicodone;   Take 1 tablet (5 mg) by mouth every 4 hours if needed for severe pain (7 -   10) for up to 7 days.     CHANGE how you take these medications     montelukast 10 mg tablet; Commonly known as: Singulair; TAKE 1 TABLET BY   MOUTH EVERY DAY IN THE EVENING; What changed: when to take this     CONTINUE taking these medications     albuterol sulfate 90 mcg/actuation aero powdr breath act w/sensor   inhaler; Commonly known as: Proair Digihaler   aspirin 81 mg EC tablet   atorvastatin 20 mg tablet; Commonly known as: Lipitor   FLUoxetine 40 mg capsule; Commonly known as: PROzac; Take 1 capsule (40   mg) by mouth once daily.   glimepiride 2 mg tablet; Commonly known as: Amaryl   loratadine 10 mg tablet; Commonly known as: Claritin   PriLOSEC OTC 20 mg EC tablet; Generic drug: omeprazole OTC   traZODone 50 mg tablet; Commonly known as: Desyrel; Take 1 tablet (50   mg) by mouth once daily at bedtime.   Wixela Inhub 250-50 mcg/dose diskus inhaler; Generic drug: fluticasone   propion-salmeteroL; INHALE 1 PUFF BY MOUTH TWICE A DAY INHALATION TWICE A   DAY 90 DAYS     STOP taking these medications     chlorhexidine 0.12 % solution; Commonly known as: Peridex   gabapentin 100 mg capsule; Commonly known as: Neurontin   metroNIDAZOLE 250 mg tablet; Commonly known as: Flagyl   neomycin 500 mg tablet; Commonly known as: Mycifradin       Outpatient Follow-Up  Future Appointments   Date Time Provider Department Center   3/7/2024 11:00 AM Chris Hunt MD [id number] [facility]   No

## 2024-09-06 ENCOUNTER — APPOINTMENT (OUTPATIENT)
Dept: PAIN MEDICINE | Facility: CLINIC | Age: 59
End: 2024-09-06
Payer: COMMERCIAL

## 2024-09-11 DIAGNOSIS — E11.9 TYPE 2 DIABETES MELLITUS WITHOUT COMPLICATION, WITHOUT LONG-TERM CURRENT USE OF INSULIN (MULTI): Primary | ICD-10-CM

## 2024-09-11 RX ORDER — BLOOD-GLUCOSE SENSOR
1 EACH MISCELLANEOUS
Qty: 2 EACH | Refills: 11 | Status: SHIPPED | OUTPATIENT
Start: 2024-09-11

## 2024-09-12 ENCOUNTER — APPOINTMENT (OUTPATIENT)
Dept: GASTROENTEROLOGY | Facility: CLINIC | Age: 59
End: 2024-09-12
Payer: COMMERCIAL

## 2024-09-12 VITALS — HEIGHT: 72 IN | OXYGEN SATURATION: 97 % | BODY MASS INDEX: 37.25 KG/M2 | HEART RATE: 103 BPM | WEIGHT: 275 LBS

## 2024-09-12 DIAGNOSIS — K76.0 FATTY LIVER: Primary | ICD-10-CM

## 2024-09-12 DIAGNOSIS — J45.909 UNSPECIFIED ASTHMA, UNCOMPLICATED (HHS-HCC): ICD-10-CM

## 2024-09-12 DIAGNOSIS — R10.11 RIGHT UPPER QUADRANT ABDOMINAL PAIN: ICD-10-CM

## 2024-09-12 DIAGNOSIS — D49.0 IPMN (INTRADUCTAL PAPILLARY MUCINOUS NEOPLASM): ICD-10-CM

## 2024-09-12 PROCEDURE — 1036F TOBACCO NON-USER: CPT | Performed by: INTERNAL MEDICINE

## 2024-09-12 PROCEDURE — 99214 OFFICE O/P EST MOD 30 MIN: CPT | Performed by: INTERNAL MEDICINE

## 2024-09-12 PROCEDURE — 3008F BODY MASS INDEX DOCD: CPT | Performed by: INTERNAL MEDICINE

## 2024-09-12 PROCEDURE — 3048F LDL-C <100 MG/DL: CPT | Performed by: INTERNAL MEDICINE

## 2024-09-12 RX ORDER — FLUTICASONE PROPIONATE AND SALMETEROL 250; 50 UG/1; UG/1
1 POWDER RESPIRATORY (INHALATION) 2 TIMES DAILY
Qty: 180 EACH | Refills: 2 | Status: SHIPPED | OUTPATIENT
Start: 2024-09-12

## 2024-09-12 NOTE — PROGRESS NOTES
REASON FOR VISIT: Discuss history of right-sided abdominal pain, fatty liver, pancreatic lesion    HPI:  Shaheen Ambriz is a 59 y.o. male seen in the past for colonoscopy for history of recurrent diverticulitis.  Earlier this year he underwent sigmoidectomy and did well after that.  Several months after that in the summer he experienced right-sided upper and lateral right abdominal pain.  Underwent CT imaging noncontrasted as well as HIDA scan which did not show any inflammatory process in the bowel.  Found to have fatty liver disease and seen by hepatology.  Undergoing lifestyle changes including exercise and low fat low-cholesterol diet and weight loss.  Noted on imaging to have sidebranch IPMN based on MRI.  Now abdominal pain is resolved.  No rectal bleeding or melena.  No changes of bowel habits.  No constipation.          PRIOR ENDOSCOPY    PAST MEDICAL HISTORY  Past Medical History:   Diagnosis Date    Anxiety     Asthma ([facility])     triggers: dust, weather changes-last use 9/2023    Diabetes mellitus (Multi)     A1C=7.1 10/12/23    Diverticulosis     multiple diverticulitis flares in past year    Factor V Leiden (Multi)     daily ASA, no hematologist, has been tx w/ Lovenox in past post op    GERD (gastroesophageal reflux disease)     Hyperlipidemia     Hypertension     Nephrolithiasis     last 2007    Sinusitis     r/t allergies    Syncope 09/2022    single episode, evaluated by Dr. Davey Garcia.  ECHO 9/12/22 WNL, Zio monitor WNL per patient.  No recurrance of symptoms       PAST SURGICAL HISTORY  Past Surgical History:   Procedure Laterality Date    KNEE SURGERY Right 06/2023    LITHOTRIPSY  2007    SHOULDER Left 06/2019    clavical decompression    SIGMOIDECTOMY  02/07/2024    UMBILICAL HERNIA REPAIR  12/2021    VASECTOMY  2006       FAMILY HISTORY  Family History   Problem Relation Name Age of Onset    Pancreatic cancer Mother      No Known Problems Father      No Known Problems Brother       Hypertension Other grandparent        SOCIAL HISTORY  Social History     Tobacco Use    Smoking status: Never    Smokeless tobacco: Never   Substance Use Topics    Alcohol use: Yes     Comment: pt drinks an ocassional glass of wine       REVIEW OF SYSTEMS  CONSTITUTIONAL: negative for fever, chills, fatigue, or unintentional weight loss,   HEENT: negative for icteric sclera, eye pain/redness, or changes in vision/hearing  RESPIRATORY: negative for cough, hemoptysis, wheezing, orthopnea, or dyspnea on exertion  CARDIOVASCULAR: negative for chest pain, palpitations, or syncope   GASTROINTESTINAL: as noted per HPI  GENITOURINARY: negative for dysuria, polyuria, incontinence, or hematuria  MUSCULOSKELETAL: negative for arthralgia, myalgia, or joint swelling/stiffness   INTEGUMENTARY/SKIN: negative jaundice, rash, or skin lesion  HEMATOLOGIC/LYMPHATIC: negative for prolonged bleeding, easy bruising, or swollen lymph nodes  ENDOCRINE: negative for cold/heat intolerance, polydipsia, polyuria, or goiter  NEUROLOGIC: negative for headaches, dizziness, tremor, or gait abnormality  PSYCHIATRIC: negative for anxiety, depression, personality changes, or sleep disturbances      A 10 point review of systems was completed and was otherwise negative.    ALLERGIES  Allergies   Allergen Reactions    Dulaglutide Anxiety and GI Upset     Critically High    Iodinated Contrast Media Anaphylaxis, Hives and Itching    Metformin Hcl GI Upset    Theophylline Palpitations and GI Upset       MEDICATIONS  Current Outpatient Medications   Medication Sig Dispense Refill    albuterol 90 mcg/actuation inhaler INHALE 2 PUFFS INTO THE LUNGS EVERY 4 HOURS AS NEEDED FOR 90 DAYS 18 g 3    albuterol sulfate (Proair Digihaler) 90 mcg/actuation aero powdr breath act w/sensor inhaler Inhale 2 puffs every 4 hours.      aspirin 81 mg EC tablet Take 1 tablet (81 mg) by mouth once daily.      atorvastatin (Lipitor) 20 mg tablet Take 1 tablet (20 mg) by mouth  once daily. 90 tablet 3    co-enzyme Q-10 30 mg capsule Take 1 capsule (30 mg) by mouth once daily.      empagliflozin (Jardiance) 25 mg Take by mouth once every 24 hours.      FLUoxetine (PROzac) 40 mg capsule TAKE 1 CAPSULE BY MOUTH ONCE DAILY 90 capsule 1    FreeStyle Indy 3 Sensor device 1 each by Does not apply route every 14 (fourteen) days. 2 each 11    glimepiride (Amaryl) 2 mg tablet TAKE 1 TABLET BY MOUTH EVERY DAY AS DIRECTED 90 tablet 3    loratadine (Claritin) 10 mg tablet Take 1 tablet (10 mg) by mouth once daily.      montelukast (Singulair) 10 mg tablet TAKE 1 TABLET BY MOUTH EVERY DAY IN THE EVENING 90 tablet 1    omeprazole OTC (PriLOSEC OTC) 20 mg EC tablet Take 1 capsule by mouth once daily.      traZODone (Desyrel) 50 mg tablet TAKE 1 TABLET (50 MG) BY MOUTH ONCE DAILY AT BEDTIME. 90 tablet 1    famotidine (Pepcid) 20 mg tablet Take 1 tablet (20 mg) by mouth 2 times a day for 10 days. 20 tablet 0    fluticasone propion-salmeteroL (Advair Diskus) 250-50 mcg/dose diskus inhaler INHALE 1 PUFF BY MOUTH TWICE A  each 2    gabapentin (Neurontin) 300 mg capsule Take 1 capsule nightly for 1 week then twice a day for 1 week then three times a day thereafter 90 capsule 0     No current facility-administered medications for this visit.       VITALS  Pulse 103   Ht 1.829 m (6')   Wt 125 kg (275 lb)   SpO2 97%   BMI 37.30 kg/m²      PHYSICAL EXAM  Alert and oriented in no acute distress  Abdomen soft, no focal tenderness to palpation    ASSESSMENT/ PLAN  Patient with history of right-sided abdominal pain several months ago which is now resolved.  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  Imaging showing fatty liver and he is undergoing lifestyle modifications for that.  Has evidence of sidebranch IPMN on MRI and recommended repeat MRI in 1 year.  He will be due for surveillance colonoscopy for history of adenoma in 3-1/2 years.  He expressed understanding.  He will see me back in the office as  needed.        Signature: Josue Craig MD    Date: 9/12/2024  Time: 3:33 PM

## 2024-10-14 ENCOUNTER — APPOINTMENT (OUTPATIENT)
Dept: PAIN MEDICINE | Facility: CLINIC | Age: 59
End: 2024-10-14
Payer: COMMERCIAL

## 2024-10-14 VITALS
BODY MASS INDEX: 37.25 KG/M2 | OXYGEN SATURATION: 96 % | HEIGHT: 72 IN | DIASTOLIC BLOOD PRESSURE: 83 MMHG | SYSTOLIC BLOOD PRESSURE: 157 MMHG | HEART RATE: 98 BPM | WEIGHT: 275 LBS

## 2024-10-14 DIAGNOSIS — M54.42 CHRONIC BILATERAL LOW BACK PAIN WITH LEFT-SIDED SCIATICA: ICD-10-CM

## 2024-10-14 DIAGNOSIS — M54.16 LUMBAR RADICULITIS: Primary | ICD-10-CM

## 2024-10-14 DIAGNOSIS — G89.29 CHRONIC BILATERAL LOW BACK PAIN WITH LEFT-SIDED SCIATICA: ICD-10-CM

## 2024-10-14 PROCEDURE — 99214 OFFICE O/P EST MOD 30 MIN: CPT | Performed by: ANESTHESIOLOGY

## 2024-10-14 PROCEDURE — 3079F DIAST BP 80-89 MM HG: CPT | Performed by: ANESTHESIOLOGY

## 2024-10-14 PROCEDURE — 3048F LDL-C <100 MG/DL: CPT | Performed by: ANESTHESIOLOGY

## 2024-10-14 PROCEDURE — 3077F SYST BP >= 140 MM HG: CPT | Performed by: ANESTHESIOLOGY

## 2024-10-14 PROCEDURE — 3008F BODY MASS INDEX DOCD: CPT | Performed by: ANESTHESIOLOGY

## 2024-10-14 ASSESSMENT — ENCOUNTER SYMPTOMS
CONSTITUTIONAL NEGATIVE: 1
GASTROINTESTINAL NEGATIVE: 1
EYES NEGATIVE: 1
NEUROLOGICAL NEGATIVE: 1
ENDOCRINE NEGATIVE: 1
PSYCHIATRIC NEGATIVE: 1
CARDIOVASCULAR NEGATIVE: 1
BACK PAIN: 1
HEMATOLOGIC/LYMPHATIC NEGATIVE: 1
RESPIRATORY NEGATIVE: 1

## 2024-10-14 ASSESSMENT — PAIN DESCRIPTION - DESCRIPTORS: DESCRIPTORS: ACHING

## 2024-10-14 ASSESSMENT — PAIN SCALES - GENERAL
PAINLEVEL: 5
PAINLEVEL_OUTOF10: 5 - MODERATE PAIN

## 2024-10-14 ASSESSMENT — PAIN - FUNCTIONAL ASSESSMENT: PAIN_FUNCTIONAL_ASSESSMENT: 0-10

## 2024-10-14 NOTE — PROGRESS NOTES
PAIN MANAGEMENT FOLLOW-UP OFFICE NOTE    Date of Service: 10/14/2024    SUBJECTIVE    CHIEF COMPLAINT: LBP    HISTORY OF PRESENT ILLNESS    Shaheen Ambriz is a 59 y.o. male with PMH mild intermittent asthma, NIDDM2, factor V Leiden on ASA 81 mg, NAFLD, obesity who presents for F/U    Since his last visit, pt completed MRI L-spine and would liketo discuss results. Denies relief from MDP. Claims gabapentin too sedating so self-Dc'd.     Pt denies new-onset numbness, weakness, bowel/bladder incontinence.  Pt denies recent infection, allergy to Latex/iodine/contrast. Patient is currently taking the following blood thinner(s): N/A    REVIEW OF SYSTEMS  Review of Systems   Constitutional: Negative.    HENT: Negative.     Eyes: Negative.    Respiratory: Negative.     Cardiovascular: Negative.    Gastrointestinal: Negative.    Endocrine: Negative.    Musculoskeletal:  Positive for back pain.   Skin: Negative.    Neurological: Negative.    Hematological: Negative.    Psychiatric/Behavioral: Negative.         PAST MEDICAL HISTORY  Past Medical History:   Diagnosis Date    Anxiety     Asthma     triggers: dust, weather changes-last use 9/2023    Diabetes mellitus (Multi)     A1C=7.1 10/12/23    Diverticulosis     multiple diverticulitis flares in past year    Factor V Leiden (Multi)     daily ASA, no hematologist, has been tx w/ Lovenox in past post op    GERD (gastroesophageal reflux disease)     Hyperlipidemia     Hypertension     Nephrolithiasis     last 2007    Sinusitis     r/t allergies    Syncope 09/2022    single episode, evaluated by Dr. Davey Garcia.  ECHO 9/12/22 WNL, Zio monitor WNL per patient.  No recurrance of symptoms     Past Surgical History:   Procedure Laterality Date    KNEE SURGERY Right 06/2023    LITHOTRIPSY  2007    SHOULDER Left 06/2019    clavical decompression    SIGMOIDECTOMY  02/07/2024    UMBILICAL HERNIA REPAIR  12/2021    VASECTOMY  2006     Family History   Problem Relation Name Age of  Onset    Pancreatic cancer Mother      No Known Problems Father      No Known Problems Brother      Hypertension Other grandparent        CURRENT MEDICATIONS  Current Outpatient Medications   Medication Sig Dispense Refill    albuterol 90 mcg/actuation inhaler INHALE 2 PUFFS INTO THE LUNGS EVERY 4 HOURS AS NEEDED FOR 90 DAYS 18 g 3    albuterol sulfate (Proair Digihaler) 90 mcg/actuation aero powdr breath act w/sensor inhaler Inhale 2 puffs every 4 hours.      aspirin 81 mg EC tablet Take 1 tablet (81 mg) by mouth once daily.      atorvastatin (Lipitor) 20 mg tablet Take 1 tablet (20 mg) by mouth once daily. 90 tablet 3    co-enzyme Q-10 30 mg capsule Take 1 capsule (30 mg) by mouth once daily.      FLUoxetine (PROzac) 40 mg capsule TAKE 1 CAPSULE BY MOUTH ONCE DAILY 90 capsule 1    fluticasone propion-salmeteroL (Advair Diskus) 250-50 mcg/dose diskus inhaler INHALE 1 PUFF BY MOUTH TWICE A  each 2    FreeStyle Indy 3 Sensor device 1 each by Does not apply route every 14 (fourteen) days. 2 each 11    gabapentin (Neurontin) 300 mg capsule Take 1 capsule nightly for 1 week then twice a day for 1 week then three times a day thereafter 90 capsule 0    glimepiride (Amaryl) 2 mg tablet TAKE 1 TABLET BY MOUTH EVERY DAY AS DIRECTED 90 tablet 3    loratadine (Claritin) 10 mg tablet Take 1 tablet (10 mg) by mouth once daily.      montelukast (Singulair) 10 mg tablet TAKE 1 TABLET BY MOUTH EVERY DAY IN THE EVENING 90 tablet 1    omeprazole OTC (PriLOSEC OTC) 20 mg EC tablet Take 1 capsule by mouth once daily.      traZODone (Desyrel) 50 mg tablet TAKE 1 TABLET (50 MG) BY MOUTH ONCE DAILY AT BEDTIME. 90 tablet 1    empagliflozin (Jardiance) 25 mg Take by mouth once every 24 hours.      famotidine (Pepcid) 20 mg tablet Take 1 tablet (20 mg) by mouth 2 times a day for 10 days. 20 tablet 0     No current facility-administered medications for this visit.       ALLERGIES AND DRUG REACTIONS  Allergies   Allergen Reactions     Dulaglutide Anxiety and GI Upset     Critically High    Iodinated Contrast Media Anaphylaxis, Hives and Itching    Metformin Hcl GI Upset    Theophylline Palpitations and GI Upset          OBJECTIVE  Visit Vitals  /83   Pulse 98   Ht 1.829 m (6')   Wt 125 kg (275 lb)   SpO2 96%   BMI 37.30 kg/m²   Smoking Status Never   BSA 2.52 m²       Last Recorded Pain Score (if available):                Physical Exam  Vitals and nursing note reviewed.     General: Sitting in chair, NAD  Head: NCAT  Eyes: Sclera/conjunctiva clear, EOMI, PERRL  Nose/mouth: MMM  CV: Good distal pulses  Lungs: Good/equal chest excursion  Abdomen: Soft, ND  Ext: No cyanosis/edema  MSK: L-spine alignment: unremarkable, BL paraspinal m NTTP, L-spine ROM: full    Neuro: AAOx3, CN grossly nl  Dermatome sensation to light touch  LEFT L1 (lower pelvis/upper thigh): WNL    RIGHT L1: WNL      LEFT L2 (upper thigh): WNL       RIGHT: L2:WNL      LEFT L3 (medial knee): WNL       RIGHT L3: WNL      LEFT L4 (superior medial malleolus): WNL       RIGHT L4: WNL      LEFT L5 (dorsal foot): WNL       RIGHT L5: WNL      LEFT S1 (lateral foot): WNL     RIGHT S1: WNL      LEFT S2 (popliteal fossa): WNL    RIGHT S2: WNL        Motor strength  LEFT L2 (hip flexion): 5/5   RIGHT L2: 5/5  LEFT L3 (knee extension): 5/5     RIGHT L3: 5/5  LEFT L4 (dorsiflexion): 5/5     RIGHT L4: 5/5  LEFT L5 (EHL extension): 5/5     RIGHT L5: 5/5  LEFT S1 (plantarflexion): 5/5     RIGHT S1: 5/5  LEFT S2 (knee flexion): 5/5      RIGHT S2: 5/5    Special testing  DTR unremarkable  Seated slump test +LLE    Psych: affect nl  Skin: no rash/lesions      REVIEW OF LABORATORY DATA  I have reviewed the following lab results:  WBC   Date Value Ref Range Status   08/25/2024 10.3 4.4 - 11.3 x10*3/uL Final     RBC   Date Value Ref Range Status   08/25/2024 5.45 4.50 - 5.90 x10*6/uL Final     Hemoglobin   Date Value Ref Range Status   08/25/2024 13.9 13.5 - 17.5 g/dL Final     Hematocrit   Date  Value Ref Range Status   08/25/2024 44.9 41.0 - 52.0 % Final     MCV   Date Value Ref Range Status   08/25/2024 82 80 - 100 fL Final     MCH   Date Value Ref Range Status   08/25/2024 25.5 (L) 26.0 - 34.0 pg Final     MCHC   Date Value Ref Range Status   08/25/2024 31.0 (L) 32.0 - 36.0 g/dL Final     RDW   Date Value Ref Range Status   08/25/2024 14.9 (H) 11.5 - 14.5 % Final     Platelets   Date Value Ref Range Status   08/25/2024 412 150 - 450 x10*3/uL Final     MPV   Date Value Ref Range Status   07/24/2023 8.7 7.0 - 12.6 CU Final     Sodium   Date Value Ref Range Status   08/25/2024 140 133 - 145 mmol/L Final     Potassium   Date Value Ref Range Status   08/25/2024 3.7 3.4 - 5.1 mmol/L Final     Bicarbonate   Date Value Ref Range Status   08/25/2024 21 (L) 24 - 31 mmol/L Final     Urea Nitrogen   Date Value Ref Range Status   08/25/2024 13 8 - 25 mg/dL Final     Calcium   Date Value Ref Range Status   08/25/2024 8.7 8.5 - 10.4 mg/dL Final     Protime   Date Value Ref Range Status   02/09/2019 9.8 9.3 - 12.7 SEC Final     INR   Date Value Ref Range Status   02/09/2019 0.9 0.86 - 1.16 Final     Comment:     INR Therapeutic Range: 2.0-3.5  Performed at [address] [address]           REVIEW OF RADIOLOGY   I have reviewed the following:  Radiology Studies           MRI L-spine 8/15/24:           ASSESSMENT & PLAN  Shaheen Ambriz is a 59 y.o.male with PMH  mild intermittent asthma, NIDDM2, factor V Leiden on ASA 81 mg, NAFLD, obesity who presents for F/U    1) LBP  ->15 y LBP radiating to L posterior thigh with +LLE seated slump  -Refractive to yrs of conservative tx including Tylenol, NSAIDs, IcyHot, >6 w PT, MDP, gabapentin  -Reviewed/discussed MRI L-spine 8/15/24: multilevel spondylosis featuring L4-5 disc complex contributing to mild left L4-5 NFS  -Schedule left L4-5, L5-S1 TFESI w/ IV sed (vagal to HR 20s with past injxns) to target pain generator as seen on imaging and minimize  risk/likelihood of chronic opioid use and/or surgery        Discussed procedure risks/benefits in detail with patient. Pt meets medical necessity for procedure due to failure of conservative measures. Reviewed procedural risks including bleeding, infection, nerve damage, paralysis. Also reviewed mitigating factors such as screening for infection/blood thinner use, sterile precautions, and image-guidance when applicable. All questions answered. Pt/guardian expressed understanding and choose to proceed                     Violeta Rivera MD  Anesthesiologist & Interventional Pain Physician   [facility] [place]  O: [phone]  F: [phone]  11:59 AM  10/14/24

## 2024-10-16 ENCOUNTER — TELEPHONE (OUTPATIENT)
Dept: PAIN MEDICINE | Facility: CLINIC | Age: 59
End: 2024-10-16
Payer: COMMERCIAL

## 2024-10-31 ENCOUNTER — HOSPITAL ENCOUNTER (OUTPATIENT)
Dept: GASTROENTEROLOGY | Facility: HOSPITAL | Age: 59
Discharge: HOME | End: 2024-10-31
Payer: COMMERCIAL

## 2024-10-31 VITALS
TEMPERATURE: 96.8 F | RESPIRATION RATE: 18 BRPM | SYSTOLIC BLOOD PRESSURE: 136 MMHG | HEART RATE: 61 BPM | DIASTOLIC BLOOD PRESSURE: 75 MMHG | OXYGEN SATURATION: 99 %

## 2024-10-31 DIAGNOSIS — M54.16 LUMBAR RADICULITIS: ICD-10-CM

## 2024-10-31 LAB — GLUCOSE BLD MANUAL STRIP-MCNC: 121 MG/DL (ref 74–99)

## 2024-10-31 PROCEDURE — 82947 ASSAY GLUCOSE BLOOD QUANT: CPT

## 2024-10-31 PROCEDURE — 62323 NJX INTERLAMINAR LMBR/SAC: CPT | Performed by: ANESTHESIOLOGY

## 2024-10-31 PROCEDURE — 99152 MOD SED SAME PHYS/QHP 5/>YRS: CPT | Performed by: ANESTHESIOLOGY

## 2024-10-31 PROCEDURE — 2500000004 HC RX 250 GENERAL PHARMACY W/ HCPCS (ALT 636 FOR OP/ED): Performed by: ANESTHESIOLOGY

## 2024-10-31 PROCEDURE — 2500000005 HC RX 250 GENERAL PHARMACY W/O HCPCS: Performed by: ANESTHESIOLOGY

## 2024-10-31 RX ORDER — DEXAMETHASONE SODIUM PHOSPHATE 10 MG/ML
INJECTION INTRAMUSCULAR; INTRAVENOUS AS NEEDED
Status: COMPLETED | OUTPATIENT
Start: 2024-10-31 | End: 2024-10-31

## 2024-10-31 RX ORDER — FENTANYL CITRATE 50 UG/ML
INJECTION, SOLUTION INTRAMUSCULAR; INTRAVENOUS AS NEEDED
Status: COMPLETED | OUTPATIENT
Start: 2024-10-31 | End: 2024-10-31

## 2024-10-31 RX ORDER — TRIAMCINOLONE ACETONIDE 40 MG/ML
INJECTION, SUSPENSION INTRA-ARTICULAR; INTRAMUSCULAR AS NEEDED
Status: COMPLETED | OUTPATIENT
Start: 2024-10-31 | End: 2024-10-31

## 2024-10-31 RX ORDER — SODIUM CHLORIDE 9 MG/ML
INJECTION, SOLUTION INTRAMUSCULAR; INTRAVENOUS; SUBCUTANEOUS AS NEEDED
Status: COMPLETED | OUTPATIENT
Start: 2024-10-31 | End: 2024-10-31

## 2024-10-31 RX ORDER — MIDAZOLAM HYDROCHLORIDE 1 MG/ML
INJECTION, SOLUTION INTRAMUSCULAR; INTRAVENOUS AS NEEDED
Status: COMPLETED | OUTPATIENT
Start: 2024-10-31 | End: 2024-10-31

## 2024-10-31 RX ORDER — LIDOCAINE HYDROCHLORIDE 10 MG/ML
INJECTION, SOLUTION EPIDURAL; INFILTRATION; INTRACAUDAL; PERINEURAL AS NEEDED
Status: COMPLETED | OUTPATIENT
Start: 2024-10-31 | End: 2024-10-31

## 2024-10-31 ASSESSMENT — ENCOUNTER SYMPTOMS
DEPRESSION: 0
OCCASIONAL FEELINGS OF UNSTEADINESS: 0
LOSS OF SENSATION IN FEET: 0

## 2024-10-31 ASSESSMENT — PAIN - FUNCTIONAL ASSESSMENT
PAIN_FUNCTIONAL_ASSESSMENT: 0-10
PAIN_FUNCTIONAL_ASSESSMENT: WONG-BAKER FACES
PAIN_FUNCTIONAL_ASSESSMENT: 0-10

## 2024-10-31 ASSESSMENT — PAIN SCALES - GENERAL: PAINLEVEL_OUTOF10: 0 - NO PAIN

## 2024-10-31 ASSESSMENT — PAIN DESCRIPTION - DESCRIPTORS: DESCRIPTORS: ACHING

## 2024-11-11 DIAGNOSIS — T75.3XXA MOTION SICKNESS, INITIAL ENCOUNTER: Primary | ICD-10-CM

## 2024-11-11 RX ORDER — SCOLOPAMINE TRANSDERMAL SYSTEM 1 MG/1
1 PATCH, EXTENDED RELEASE TRANSDERMAL
Qty: 10 PATCH | Refills: 0 | Status: SHIPPED | OUTPATIENT
Start: 2024-11-11 | End: 2025-01-10

## 2024-11-26 ENCOUNTER — APPOINTMENT (OUTPATIENT)
Dept: PAIN MEDICINE | Facility: CLINIC | Age: 59
End: 2024-11-26
Payer: COMMERCIAL

## 2024-11-27 ENCOUNTER — OFFICE VISIT (OUTPATIENT)
Dept: PAIN MEDICINE | Facility: CLINIC | Age: 59
End: 2024-11-27
Payer: COMMERCIAL

## 2024-11-27 VITALS — RESPIRATION RATE: 18 BRPM | HEART RATE: 74 BPM | DIASTOLIC BLOOD PRESSURE: 74 MMHG | SYSTOLIC BLOOD PRESSURE: 138 MMHG

## 2024-11-27 DIAGNOSIS — M54.42 CHRONIC LEFT-SIDED LOW BACK PAIN WITH LEFT-SIDED SCIATICA: ICD-10-CM

## 2024-11-27 DIAGNOSIS — M54.16 LUMBAR RADICULITIS: Primary | ICD-10-CM

## 2024-11-27 DIAGNOSIS — G89.29 CHRONIC LEFT-SIDED LOW BACK PAIN WITH LEFT-SIDED SCIATICA: ICD-10-CM

## 2024-11-27 PROCEDURE — 99214 OFFICE O/P EST MOD 30 MIN: CPT | Performed by: ANESTHESIOLOGY

## 2024-11-27 PROCEDURE — 3075F SYST BP GE 130 - 139MM HG: CPT | Performed by: ANESTHESIOLOGY

## 2024-11-27 PROCEDURE — 3078F DIAST BP <80 MM HG: CPT | Performed by: ANESTHESIOLOGY

## 2024-11-27 PROCEDURE — 3048F LDL-C <100 MG/DL: CPT | Performed by: ANESTHESIOLOGY

## 2024-11-27 RX ORDER — BUSPIRONE HYDROCHLORIDE 5 MG/1
1 TABLET ORAL
COMMUNITY
Start: 2024-10-21

## 2024-11-27 ASSESSMENT — PAIN - FUNCTIONAL ASSESSMENT: PAIN_FUNCTIONAL_ASSESSMENT: 0-10

## 2024-11-27 ASSESSMENT — ENCOUNTER SYMPTOMS
NEUROLOGICAL NEGATIVE: 1
CARDIOVASCULAR NEGATIVE: 1
GASTROINTESTINAL NEGATIVE: 1
ENDOCRINE NEGATIVE: 1
PSYCHIATRIC NEGATIVE: 1
HEMATOLOGIC/LYMPHATIC NEGATIVE: 1
EYES NEGATIVE: 1
CONSTITUTIONAL NEGATIVE: 1
BACK PAIN: 1
RESPIRATORY NEGATIVE: 1

## 2024-11-27 ASSESSMENT — PATIENT HEALTH QUESTIONNAIRE - PHQ9
SUM OF ALL RESPONSES TO PHQ9 QUESTIONS 1 AND 2: 0
1. LITTLE INTEREST OR PLEASURE IN DOING THINGS: NOT AT ALL
2. FEELING DOWN, DEPRESSED OR HOPELESS: NOT AT ALL

## 2024-11-27 ASSESSMENT — PAIN SCALES - GENERAL
PAINLEVEL_OUTOF10: 5
PAINLEVEL_OUTOF10: 5 - MODERATE PAIN

## 2024-11-27 ASSESSMENT — PAIN DESCRIPTION - DESCRIPTORS: DESCRIPTORS: BURNING;THROBBING;ACHING

## 2024-11-27 NOTE — PROGRESS NOTES
PAIN MANAGEMENT FOLLOW-UP OFFICE NOTE    Date of Service: 11/27/2024    SUBJECTIVE    CHIEF COMPLAINT: LBP    HISTORY OF PRESENT ILLNESS    Shaheen Ambriz is a 59 y.o. male with PMH mild intermittent asthma, NIDDM2, factor V Leiden on ASA 81 mg, NAFLD, obesity who presents for F/U    On 10/31, pt underwent left L4-5, L5-S1 TFESI with 90% relief 3 w. Since that time, LBP has returned to baseline radiating to L posterior thigh.    Pt denies new-onset numbness, weakness, bowel/bladder incontinence.  Pt denies recent infection, allergy to Latex/iodine/contrast. Patient is currently taking the following blood thinner(s): N/A    Procedure log:  -Left L4-5, l5-S1 TFESI 10/31/24: 90% relief 3 w      REVIEW OF SYSTEMS  Review of Systems   Constitutional: Negative.    HENT: Negative.     Eyes: Negative.    Respiratory: Negative.     Cardiovascular: Negative.    Gastrointestinal: Negative.    Endocrine: Negative.    Musculoskeletal:  Positive for back pain.   Skin: Negative.    Neurological: Negative.    Hematological: Negative.    Psychiatric/Behavioral: Negative.         PAST MEDICAL HISTORY  Past Medical History:   Diagnosis Date    Anxiety     Asthma     triggers: dust, weather changes-last use 9/2023    Diabetes mellitus (Multi)     A1C=7.1 10/12/23    Diverticulosis     multiple diverticulitis flares in past year    Factor V Leiden (Multi)     daily ASA, no hematologist, has been tx w/ Lovenox in past post op    GERD (gastroesophageal reflux disease)     Hyperlipidemia     Hypertension     Nephrolithiasis     last 2007    Sinusitis     r/t allergies    Syncope 09/2022    single episode, evaluated by Dr. Davey Garcia.  ECHO 9/12/22 WNL, Zio monitor WNL per patient.  No recurrance of symptoms     Past Surgical History:   Procedure Laterality Date    KNEE SURGERY Right 06/2023    LITHOTRIPSY  2007    SHOULDER Left 06/2019    clavical decompression    SIGMOIDECTOMY  02/07/2024    UMBILICAL HERNIA REPAIR  12/2021     VASECTOMY  2006     Family History   Problem Relation Name Age of Onset    Pancreatic cancer Mother      No Known Problems Father      No Known Problems Brother      Hypertension Other grandparent        CURRENT MEDICATIONS  Current Outpatient Medications   Medication Sig Dispense Refill    albuterol 90 mcg/actuation inhaler INHALE 2 PUFFS INTO THE LUNGS EVERY 4 HOURS AS NEEDED FOR 90 DAYS 18 g 3    albuterol sulfate (Proair Digihaler) 90 mcg/actuation aero powdr breath act w/sensor inhaler Inhale 2 puffs every 4 hours.      aspirin 81 mg EC tablet Take 1 tablet (81 mg) by mouth once daily.      atorvastatin (Lipitor) 20 mg tablet Take 1 tablet (20 mg) by mouth once daily. 90 tablet 3    busPIRone (Buspar) 5 mg tablet Take 1 tablet (5 mg) by mouth every 12 hours.      co-enzyme Q-10 30 mg capsule Take 1 capsule (30 mg) by mouth once daily.      FLUoxetine (PROzac) 40 mg capsule TAKE 1 CAPSULE BY MOUTH ONCE DAILY 90 capsule 1    fluticasone propion-salmeteroL (Advair Diskus) 250-50 mcg/dose diskus inhaler INHALE 1 PUFF BY MOUTH TWICE A  each 2    FreeStyle Indy 3 Sensor device 1 each by Does not apply route every 14 (fourteen) days. 2 each 11    glimepiride (Amaryl) 2 mg tablet TAKE 1 TABLET BY MOUTH EVERY DAY AS DIRECTED 90 tablet 3    loratadine (Claritin) 10 mg tablet Take 1 tablet (10 mg) by mouth once daily.      montelukast (Singulair) 10 mg tablet TAKE 1 TABLET BY MOUTH EVERY DAY IN THE EVENING 90 tablet 1    omeprazole OTC (PriLOSEC OTC) 20 mg EC tablet Take 1 capsule by mouth once daily.      scopolamine (Transderm-Scop) 1 mg over 3 days patch 3 day Place 1 patch over 72 hours on the skin every 3rd day if needed (nausea). 10 patch 0    traZODone (Desyrel) 50 mg tablet TAKE 1 TABLET (50 MG) BY MOUTH ONCE DAILY AT BEDTIME. 90 tablet 1    famotidine (Pepcid) 20 mg tablet Take 1 tablet (20 mg) by mouth 2 times a day for 10 days. 20 tablet 0     No current facility-administered medications for this visit.        ALLERGIES AND DRUG REACTIONS  Allergies   Allergen Reactions    Dulaglutide Anxiety and GI Upset     Critically High    Iodinated Contrast Media Anaphylaxis, Hives and Itching    Metformin Hcl GI Upset    Theophylline Palpitations and GI Upset          OBJECTIVE  Visit Vitals  /74   Pulse 74   Resp 18   Smoking Status Never       Last Recorded Pain Score (if available):                Physical Exam  Vitals and nursing note reviewed.       General: Sitting in chair, NAD  Head: NCAT  Eyes: Sclera/conjunctiva clear, EOMI, PERRL  Nose/mouth: MMM  CV: Good distal pulses  Lungs: Good/equal chest excursion  Abdomen: Soft, ND  Ext: No cyanosis/edema  MSK: L-spine alignment: unremarkable, BL paraspinal m NTTP, L-spine ROM: full    Neuro: AAOx3, CN grossly nl  Dermatome sensation to light touch  LEFT L1 (lower pelvis/upper thigh): WNL    RIGHT L1: WNL      LEFT L2 (upper thigh): WNL       RIGHT: L2:WNL      LEFT L3 (medial knee): WNL       RIGHT L3: WNL      LEFT L4 (superior medial malleolus): WNL       RIGHT L4: WNL      LEFT L5 (dorsal foot): WNL       RIGHT L5: WNL      LEFT S1 (lateral foot): WNL     RIGHT S1: WNL      LEFT S2 (popliteal fossa): WNL    RIGHT S2: WNL        Motor strength  LEFT L2 (hip flexion): 5/5   RIGHT L2: 5/5  LEFT L3 (knee extension): 5/5     RIGHT L3: 5/5  LEFT L4 (dorsiflexion): 5/5     RIGHT L4: 5/5  LEFT L5 (EHL extension): 5/5     RIGHT L5: 5/5  LEFT S1 (plantarflexion): 5/5     RIGHT S1: 5/5  LEFT S2 (knee flexion): 5/5      RIGHT S2: 5/5    Special testing  DTR unremarkable  Seated slump test +LLE    Psych: affect nl  Skin: no rash/lesions      REVIEW OF LABORATORY DATA  I have reviewed the following lab results:  WBC   Date Value Ref Range Status   08/25/2024 10.3 4.4 - 11.3 x10*3/uL Final     RBC   Date Value Ref Range Status   08/25/2024 5.45 4.50 - 5.90 x10*6/uL Final     Hemoglobin   Date Value Ref Range Status   08/25/2024 13.9 13.5 - 17.5 g/dL Final     Hematocrit   Date  Value Ref Range Status   08/25/2024 44.9 41.0 - 52.0 % Final     MCV   Date Value Ref Range Status   08/25/2024 82 80 - 100 fL Final     MCH   Date Value Ref Range Status   08/25/2024 25.5 (L) 26.0 - 34.0 pg Final     MCHC   Date Value Ref Range Status   08/25/2024 31.0 (L) 32.0 - 36.0 g/dL Final     RDW   Date Value Ref Range Status   08/25/2024 14.9 (H) 11.5 - 14.5 % Final     Platelets   Date Value Ref Range Status   08/25/2024 412 150 - 450 x10*3/uL Final     MPV   Date Value Ref Range Status   07/24/2023 8.7 7.0 - 12.6 CU Final     Sodium   Date Value Ref Range Status   08/25/2024 140 133 - 145 mmol/L Final     Potassium   Date Value Ref Range Status   08/25/2024 3.7 3.4 - 5.1 mmol/L Final     Bicarbonate   Date Value Ref Range Status   08/25/2024 21 (L) 24 - 31 mmol/L Final     Urea Nitrogen   Date Value Ref Range Status   08/25/2024 13 8 - 25 mg/dL Final     Calcium   Date Value Ref Range Status   08/25/2024 8.7 8.5 - 10.4 mg/dL Final     Protime   Date Value Ref Range Status   02/09/2019 9.8 9.3 - 12.7 SEC Final     INR   Date Value Ref Range Status   02/09/2019 0.9 0.86 - 1.16 Final     Comment:     INR Therapeutic Range: 2.0-3.5  Performed at [address] [address]           REVIEW OF RADIOLOGY   I have reviewed the following:  Radiology Studies           MRI L-spine 8/15/24:           ASSESSMENT & PLAN  Shaheen Ambriz is a 59 y.o.male with PMH  mild intermittent asthma, NIDDM2, factor V Leiden on ASA 81 mg, NAFLD, obesity who presents for F/U    1) LBP  ->15 y LBP radiating to L posterior thigh with +LLE seated slump  -Refractive to yrs of conservative tx including Tylenol, NSAIDs, IcyHot, >6 w PT, MDP, gabapentin  -MRI L-spine 8/15/24: multilevel spondylosis featuring L4-5 disc complex contributing to mild left L4-5 NFS  -Left L4-5, l5-S1 TFESI 10/31/24: 90% relief 3 w  -Schedule L4-5 ILESI sans contrast w/ IV sed (vagal to HR 20s with past injxns) for cumulative effect to target pain  generator as seen on imaging and minimize risk/likelihood of chronic opioid use and/or surgery        Discussed procedure risks/benefits in detail with patient. Pt meets medical necessity for procedure due to failure of conservative measures. Reviewed procedural risks including bleeding, infection, nerve damage, paralysis. Also reviewed mitigating factors such as screening for infection/blood thinner use, sterile precautions, and image-guidance when applicable. All questions answered. Pt/guardian expressed understanding and choose to proceed                     Violeta Rivera MD  Anesthesiologist & Interventional Pain Physician   [facility] [place]  O: [phone]  F: [phone]  9:33 AM  11/27/24

## 2024-11-27 NOTE — H&P (VIEW-ONLY)
PAIN MANAGEMENT FOLLOW-UP OFFICE NOTE    Date of Service: 11/27/2024    SUBJECTIVE    CHIEF COMPLAINT: LBP    HISTORY OF PRESENT ILLNESS    Shaheen Ambriz is a 59 y.o. male with PMH mild intermittent asthma, NIDDM2, factor V Leiden on ASA 81 mg, NAFLD, obesity who presents for F/U    On 10/31, pt underwent left L4-5, L5-S1 TFESI with 90% relief 3 w. Since that time, LBP has returned to baseline radiating to L posterior thigh.    Pt denies new-onset numbness, weakness, bowel/bladder incontinence.  Pt denies recent infection, allergy to Latex/iodine/contrast. Patient is currently taking the following blood thinner(s): N/A    Procedure log:  -Left L4-5, l5-S1 TFESI 10/31/24: 90% relief 3 w      REVIEW OF SYSTEMS  Review of Systems   Constitutional: Negative.    HENT: Negative.     Eyes: Negative.    Respiratory: Negative.     Cardiovascular: Negative.    Gastrointestinal: Negative.    Endocrine: Negative.    Musculoskeletal:  Positive for back pain.   Skin: Negative.    Neurological: Negative.    Hematological: Negative.    Psychiatric/Behavioral: Negative.         PAST MEDICAL HISTORY  Past Medical History:   Diagnosis Date    Anxiety     Asthma     triggers: dust, weather changes-last use 9/2023    Diabetes mellitus (Multi)     A1C=7.1 10/12/23    Diverticulosis     multiple diverticulitis flares in past year    Factor V Leiden (Multi)     daily ASA, no hematologist, has been tx w/ Lovenox in past post op    GERD (gastroesophageal reflux disease)     Hyperlipidemia     Hypertension     Nephrolithiasis     last 2007    Sinusitis     r/t allergies    Syncope 09/2022    single episode, evaluated by Dr. Davey Garcia.  ECHO 9/12/22 WNL, Zio monitor WNL per patient.  No recurrance of symptoms     Past Surgical History:   Procedure Laterality Date    KNEE SURGERY Right 06/2023    LITHOTRIPSY  2007    SHOULDER Left 06/2019    clavical decompression    SIGMOIDECTOMY  02/07/2024    UMBILICAL HERNIA REPAIR  12/2021     VASECTOMY  2006     Family History   Problem Relation Name Age of Onset    Pancreatic cancer Mother      No Known Problems Father      No Known Problems Brother      Hypertension Other grandparent        CURRENT MEDICATIONS  Current Outpatient Medications   Medication Sig Dispense Refill    albuterol 90 mcg/actuation inhaler INHALE 2 PUFFS INTO THE LUNGS EVERY 4 HOURS AS NEEDED FOR 90 DAYS 18 g 3    albuterol sulfate (Proair Digihaler) 90 mcg/actuation aero powdr breath act w/sensor inhaler Inhale 2 puffs every 4 hours.      aspirin 81 mg EC tablet Take 1 tablet (81 mg) by mouth once daily.      atorvastatin (Lipitor) 20 mg tablet Take 1 tablet (20 mg) by mouth once daily. 90 tablet 3    busPIRone (Buspar) 5 mg tablet Take 1 tablet (5 mg) by mouth every 12 hours.      co-enzyme Q-10 30 mg capsule Take 1 capsule (30 mg) by mouth once daily.      FLUoxetine (PROzac) 40 mg capsule TAKE 1 CAPSULE BY MOUTH ONCE DAILY 90 capsule 1    fluticasone propion-salmeteroL (Advair Diskus) 250-50 mcg/dose diskus inhaler INHALE 1 PUFF BY MOUTH TWICE A  each 2    FreeStyle Indy 3 Sensor device 1 each by Does not apply route every 14 (fourteen) days. 2 each 11    glimepiride (Amaryl) 2 mg tablet TAKE 1 TABLET BY MOUTH EVERY DAY AS DIRECTED 90 tablet 3    loratadine (Claritin) 10 mg tablet Take 1 tablet (10 mg) by mouth once daily.      montelukast (Singulair) 10 mg tablet TAKE 1 TABLET BY MOUTH EVERY DAY IN THE EVENING 90 tablet 1    omeprazole OTC (PriLOSEC OTC) 20 mg EC tablet Take 1 capsule by mouth once daily.      scopolamine (Transderm-Scop) 1 mg over 3 days patch 3 day Place 1 patch over 72 hours on the skin every 3rd day if needed (nausea). 10 patch 0    traZODone (Desyrel) 50 mg tablet TAKE 1 TABLET (50 MG) BY MOUTH ONCE DAILY AT BEDTIME. 90 tablet 1    famotidine (Pepcid) 20 mg tablet Take 1 tablet (20 mg) by mouth 2 times a day for 10 days. 20 tablet 0     No current facility-administered medications for this visit.        ALLERGIES AND DRUG REACTIONS  Allergies   Allergen Reactions    Dulaglutide Anxiety and GI Upset     Critically High    Iodinated Contrast Media Anaphylaxis, Hives and Itching    Metformin Hcl GI Upset    Theophylline Palpitations and GI Upset          OBJECTIVE  Visit Vitals  /74   Pulse 74   Resp 18   Smoking Status Never       Last Recorded Pain Score (if available):                Physical Exam  Vitals and nursing note reviewed.       General: Sitting in chair, NAD  Head: NCAT  Eyes: Sclera/conjunctiva clear, EOMI, PERRL  Nose/mouth: MMM  CV: Good distal pulses  Lungs: Good/equal chest excursion  Abdomen: Soft, ND  Ext: No cyanosis/edema  MSK: L-spine alignment: unremarkable, BL paraspinal m NTTP, L-spine ROM: full    Neuro: AAOx3, CN grossly nl  Dermatome sensation to light touch  LEFT L1 (lower pelvis/upper thigh): WNL    RIGHT L1: WNL      LEFT L2 (upper thigh): WNL       RIGHT: L2:WNL      LEFT L3 (medial knee): WNL       RIGHT L3: WNL      LEFT L4 (superior medial malleolus): WNL       RIGHT L4: WNL      LEFT L5 (dorsal foot): WNL       RIGHT L5: WNL      LEFT S1 (lateral foot): WNL     RIGHT S1: WNL      LEFT S2 (popliteal fossa): WNL    RIGHT S2: WNL        Motor strength  LEFT L2 (hip flexion): 5/5   RIGHT L2: 5/5  LEFT L3 (knee extension): 5/5     RIGHT L3: 5/5  LEFT L4 (dorsiflexion): 5/5     RIGHT L4: 5/5  LEFT L5 (EHL extension): 5/5     RIGHT L5: 5/5  LEFT S1 (plantarflexion): 5/5     RIGHT S1: 5/5  LEFT S2 (knee flexion): 5/5      RIGHT S2: 5/5    Special testing  DTR unremarkable  Seated slump test +LLE    Psych: affect nl  Skin: no rash/lesions      REVIEW OF LABORATORY DATA  I have reviewed the following lab results:  WBC   Date Value Ref Range Status   08/25/2024 10.3 4.4 - 11.3 x10*3/uL Final     RBC   Date Value Ref Range Status   08/25/2024 5.45 4.50 - 5.90 x10*6/uL Final     Hemoglobin   Date Value Ref Range Status   08/25/2024 13.9 13.5 - 17.5 g/dL Final     Hematocrit   Date  Value Ref Range Status   08/25/2024 44.9 41.0 - 52.0 % Final     MCV   Date Value Ref Range Status   08/25/2024 82 80 - 100 fL Final     MCH   Date Value Ref Range Status   08/25/2024 25.5 (L) 26.0 - 34.0 pg Final     MCHC   Date Value Ref Range Status   08/25/2024 31.0 (L) 32.0 - 36.0 g/dL Final     RDW   Date Value Ref Range Status   08/25/2024 14.9 (H) 11.5 - 14.5 % Final     Platelets   Date Value Ref Range Status   08/25/2024 412 150 - 450 x10*3/uL Final     MPV   Date Value Ref Range Status   07/24/2023 8.7 7.0 - 12.6 CU Final     Sodium   Date Value Ref Range Status   08/25/2024 140 133 - 145 mmol/L Final     Potassium   Date Value Ref Range Status   08/25/2024 3.7 3.4 - 5.1 mmol/L Final     Bicarbonate   Date Value Ref Range Status   08/25/2024 21 (L) 24 - 31 mmol/L Final     Urea Nitrogen   Date Value Ref Range Status   08/25/2024 13 8 - 25 mg/dL Final     Calcium   Date Value Ref Range Status   08/25/2024 8.7 8.5 - 10.4 mg/dL Final     Protime   Date Value Ref Range Status   02/09/2019 9.8 9.3 - 12.7 SEC Final     INR   Date Value Ref Range Status   02/09/2019 0.9 0.86 - 1.16 Final     Comment:     INR Therapeutic Range: 2.0-3.5  Performed at [address] [address]           REVIEW OF RADIOLOGY   I have reviewed the following:  Radiology Studies           MRI L-spine 8/15/24:           ASSESSMENT & PLAN  Shaheen Ambriz is a 59 y.o.male with PMH  mild intermittent asthma, NIDDM2, factor V Leiden on ASA 81 mg, NAFLD, obesity who presents for F/U    1) LBP  ->15 y LBP radiating to L posterior thigh with +LLE seated slump  -Refractive to yrs of conservative tx including Tylenol, NSAIDs, IcyHot, >6 w PT, MDP, gabapentin  -MRI L-spine 8/15/24: multilevel spondylosis featuring L4-5 disc complex contributing to mild left L4-5 NFS  -Left L4-5, l5-S1 TFESI 10/31/24: 90% relief 3 w  -Schedule L4-5 ILESI sans contrast w/ IV sed (vagal to HR 20s with past injxns) for cumulative effect to target pain  generator as seen on imaging and minimize risk/likelihood of chronic opioid use and/or surgery        Discussed procedure risks/benefits in detail with patient. Pt meets medical necessity for procedure due to failure of conservative measures. Reviewed procedural risks including bleeding, infection, nerve damage, paralysis. Also reviewed mitigating factors such as screening for infection/blood thinner use, sterile precautions, and image-guidance when applicable. All questions answered. Pt/guardian expressed understanding and choose to proceed                     Violeta Rivera MD  Anesthesiologist & Interventional Pain Physician   [facility]  O: [phone]  F: [phone]  9:33 AM  11/27/24

## 2024-12-02 ENCOUNTER — APPOINTMENT (OUTPATIENT)
Dept: ENDOCRINOLOGY | Facility: CLINIC | Age: 59
End: 2024-12-02
Payer: COMMERCIAL

## 2024-12-11 DIAGNOSIS — F32.A DEPRESSION, UNSPECIFIED DEPRESSION TYPE: ICD-10-CM

## 2024-12-12 ENCOUNTER — HOSPITAL ENCOUNTER (OUTPATIENT)
Dept: GASTROENTEROLOGY | Facility: HOSPITAL | Age: 59
Discharge: HOME | End: 2024-12-12
Payer: COMMERCIAL

## 2024-12-12 VITALS
DIASTOLIC BLOOD PRESSURE: 80 MMHG | SYSTOLIC BLOOD PRESSURE: 140 MMHG | RESPIRATION RATE: 16 BRPM | OXYGEN SATURATION: 96 % | HEART RATE: 88 BPM | BODY MASS INDEX: 35.89 KG/M2 | WEIGHT: 265 LBS | HEIGHT: 72 IN

## 2024-12-12 DIAGNOSIS — M54.16 LUMBAR RADICULITIS: ICD-10-CM

## 2024-12-12 LAB — GLUCOSE BLD MANUAL STRIP-MCNC: 109 MG/DL (ref 74–99)

## 2024-12-12 PROCEDURE — 82947 ASSAY GLUCOSE BLOOD QUANT: CPT

## 2024-12-12 PROCEDURE — 62323 NJX INTERLAMINAR LMBR/SAC: CPT | Performed by: ANESTHESIOLOGY

## 2024-12-12 PROCEDURE — 2500000004 HC RX 250 GENERAL PHARMACY W/ HCPCS (ALT 636 FOR OP/ED): Performed by: ANESTHESIOLOGY

## 2024-12-12 PROCEDURE — 2500000005 HC RX 250 GENERAL PHARMACY W/O HCPCS: Performed by: ANESTHESIOLOGY

## 2024-12-12 PROCEDURE — 99152 MOD SED SAME PHYS/QHP 5/>YRS: CPT | Performed by: ANESTHESIOLOGY

## 2024-12-12 RX ORDER — LIDOCAINE HYDROCHLORIDE 10 MG/ML
INJECTION, SOLUTION EPIDURAL; INFILTRATION; INTRACAUDAL; PERINEURAL AS NEEDED
Status: COMPLETED | OUTPATIENT
Start: 2024-12-12 | End: 2024-12-12

## 2024-12-12 RX ORDER — MIDAZOLAM HYDROCHLORIDE 1 MG/ML
INJECTION, SOLUTION INTRAMUSCULAR; INTRAVENOUS AS NEEDED
Status: COMPLETED | OUTPATIENT
Start: 2024-12-12 | End: 2024-12-12

## 2024-12-12 RX ORDER — FENTANYL CITRATE 50 UG/ML
INJECTION, SOLUTION INTRAMUSCULAR; INTRAVENOUS AS NEEDED
Status: COMPLETED | OUTPATIENT
Start: 2024-12-12 | End: 2024-12-12

## 2024-12-12 RX ORDER — SODIUM CHLORIDE 9 MG/ML
INJECTION, SOLUTION INTRAMUSCULAR; INTRAVENOUS; SUBCUTANEOUS AS NEEDED
Status: COMPLETED | OUTPATIENT
Start: 2024-12-12 | End: 2024-12-12

## 2024-12-12 RX ORDER — TRIAMCINOLONE ACETONIDE 40 MG/ML
INJECTION, SUSPENSION INTRA-ARTICULAR; INTRAMUSCULAR AS NEEDED
Status: COMPLETED | OUTPATIENT
Start: 2024-12-12 | End: 2024-12-12

## 2024-12-12 RX ORDER — FLUOXETINE HYDROCHLORIDE 40 MG/1
40 CAPSULE ORAL DAILY
Qty: 90 CAPSULE | Refills: 1 | Status: SHIPPED | OUTPATIENT
Start: 2024-12-12

## 2024-12-12 ASSESSMENT — PAIN SCALES - GENERAL
PAINLEVEL_OUTOF10: 0 - NO PAIN
PAINLEVEL_OUTOF10: 2
PAINLEVEL_OUTOF10: 2
PAINLEVEL_OUTOF10: 4

## 2024-12-12 ASSESSMENT — PAIN - FUNCTIONAL ASSESSMENT
PAIN_FUNCTIONAL_ASSESSMENT: 0-10

## 2024-12-12 ASSESSMENT — ENCOUNTER SYMPTOMS
DEPRESSION: 0
OCCASIONAL FEELINGS OF UNSTEADINESS: 0
LOSS OF SENSATION IN FEET: 0

## 2024-12-12 ASSESSMENT — PAIN DESCRIPTION - DESCRIPTORS: DESCRIPTORS: THROBBING

## 2024-12-12 ASSESSMENT — COLUMBIA-SUICIDE SEVERITY RATING SCALE - C-SSRS
6. HAVE YOU EVER DONE ANYTHING, STARTED TO DO ANYTHING, OR PREPARED TO DO ANYTHING TO END YOUR LIFE?: NO
2. HAVE YOU ACTUALLY HAD ANY THOUGHTS OF KILLING YOURSELF?: NO
1. IN THE PAST MONTH, HAVE YOU WISHED YOU WERE DEAD OR WISHED YOU COULD GO TO SLEEP AND NOT WAKE UP?: NO

## 2024-12-12 NOTE — INTERVAL H&P NOTE
H&P reviewed. The patient was examined and there are no changes to the H&P. Shaheen Ambriz is a 59 y.o. male with PMH mild intermittent asthma, NIDDM2, factor V Leiden on ASA 81 mg, NAFLD, obesity who presents for  L4-5 ILESI sans contrast w/ IV sed (hx vagal to HR 20s with past injxns) for cumulative effect to target pain generator as seen on imaging and minimize risk/likelihood of chronic opioid use and/or surgery. Patient's pain stable and persistent from last visit.  Appropriately NPO. ASA 3. No personal/family hx issues with anesthesia. Denies allergies to Latex, steroids, local anesthetics, or iodine/contrast. Denies being on blood thinners. .  Denies fever, chills, NS, CP, SOB, cough, N/V.    Discussed procedure risks/benefits in detail with patient. Pt meets medical necessity for procedure due to failure of conservative measures. Reviewed procedural risks including bleeding, infection, nerve damage, paralysis. Also reviewed mitigating factors such as screening for infection/blood thinner use, sterile precautions, and image-guidance when applicable. All questions answered. Pt/guardian expressed understanding and choose to proceed      Violeta Rivera MD  Anesthesiologist & Interventional Pain Physician   [facility]  O: [phone]  F: [phone]  10:42 AM  12/12/24

## 2024-12-12 NOTE — DISCHARGE INSTRUCTIONS
DISCHARGE INSTRUCTIONS FOR INJECTIONS     You underwent a lumbar epidural steroid injection today    Aftermost injections, it is recommended that you relax and limit your activity for the remainder of the day unless you have been told otherwise by your pain physician.  You should not drive a car, operate machinery, or make important legal decisions unless otherwise directed by your pain physician.  You may resume your normal activity, including exercise, tomorrow.      Keep a written pain diary of how much pain relief you experienced following the injection procedure and the length of time of pain relief you experienced pain relief. Following diagnostic injections like medial branch nerve blocks, sacroiliac joint blocks, stellate ganglion injections and other blocks, it is very important you record the specific amount of pain relief you experienced immediately after the injectionand how long it lasted. Your doctor will ask you for this information at your follow up visit.     For all injections, please keep the injection site dry and inspect the site for a couple of days. You may remove the Band-Aid the day of the injection at any time.     Some discomfort, bruising or slight swelling may occur at the injection site. This is not abnormal if it occurs.  If needed you may:    -Take over the counter medication such as Tylenol or Motrin.   -Apply an ice pack for 30 minutes, 2 to 3 times a day for the first 24 hours.     You may shower today; no soaking baths, hot tubs, whirlpools or swimming pools for two days.      If you are given steroids in your injection, it may take 3-5 days for the steroid medication to take effect. You may notice a worsening of your symptoms for 1-2 days after the injection. This is not abnormal.  You may use acetaminophen, ibuprofen, or prescription medication that your doctor may have prescribed for you if you need to do so.     A few common side effects of steroids include facial flushing,  sweating, restlessness, irritability,difficulty sleeping, increase in blood sugar, and increased blood pressure. If you have diabetes, please monitor your blood sugar at least once a day for at least 5 days. If you have poorly controlled high blood pressure, monitoryour blood pressure for at least 2 days and contact your primary care physician if these numbers are unusually high for you.      If you take aspirin or non-steroidal anti-inflammatory drugs (examples are Motrin, Advil, ibuprofen, Naprosyn, Voltaren, Relafen, etc.) you may restart these this evening, but stop taking it 3 days before your next appointment, unless instructed otherwiseby your physician.      You do not need to discontinue non-aspirin-containing pain medications prior to an injection (examples: Celebrex, tramadol, hydrocodone and acetaminophen).      If you take a blood thinning medication (Coumadin, Lovenox, Fragmin,Ticlid, Plavix, Pradaxa, etc.), please discuss this with your primary care physician/cardiologist and your pain physician. These medications MUST be discontinued before you can have an injection safely, without the risk of uncontrolled bleeding. If these medications are not discontinued for an appropriate period of time, you will not be able to receivean injection.      If you are taking Coumadin, please have your INR checked the morning of your procedure and bringthe result to your appointment unless otherwise instructed. If your INR is over 1.2, your injection may need to be rescheduled to avoid uncontrolled bleeding from the needle placement.     Call [facility] at [phone] between 8am-4pm Monday - Friday if you are experiencing the following:    If you received an epidural or spinal injection:    -Headache that doesnot go away with medicine, is worse when sitting or standing up, and is greatly relieved upon lying down.   -Severe pain worse than or different than your baseline pain.   -Chills or fever  (101º F or greater).   -Drainage or signs of infection at the injection site     Go directly to the Emergency Department if you are experiencing the following and received an epidural or spinal injection:   -Abrupt weakness or progressive weakness in your legs that starts after you leave the clinic.   -Abrupt severe or worsening numbness in your legs.   -Inability to urinate after the injection or loss of bowel or bladder control without the urge to defecate or urinate.     If you have a clinical question that cannot wait until your next appointment, please call [phone] between 8am-4pm Monday - Friday or send a Punctil message. We do our best to return all non-emergency messages within 24 hours, Monday - Friday. A nurse or physician will return your message.      If you need to cancel an appointment, please call the scheduling staff at [phone] during normal business hours or leave a message at least 24 hours in advance.     If you are going to be sedated for your next procedure, you MUST have responsible adult who can legally drive accompany you home. You cannot eat or drink for eight hours prior to the planned procedure if you are going to receive sedation. You may take your non-blood thinning medications with a small sip of water.

## 2025-01-17 ENCOUNTER — APPOINTMENT (OUTPATIENT)
Dept: PAIN MEDICINE | Facility: CLINIC | Age: 60
End: 2025-01-17
Payer: COMMERCIAL

## 2025-01-17 VITALS — HEART RATE: 87 BPM | DIASTOLIC BLOOD PRESSURE: 64 MMHG | OXYGEN SATURATION: 98 % | SYSTOLIC BLOOD PRESSURE: 128 MMHG

## 2025-01-17 DIAGNOSIS — M54.42 CHRONIC BILATERAL LOW BACK PAIN WITH LEFT-SIDED SCIATICA: Primary | ICD-10-CM

## 2025-01-17 DIAGNOSIS — G89.29 CHRONIC BILATERAL LOW BACK PAIN WITH LEFT-SIDED SCIATICA: Primary | ICD-10-CM

## 2025-01-17 DIAGNOSIS — M54.16 LUMBAR RADICULOPATHY: ICD-10-CM

## 2025-01-17 PROCEDURE — 3074F SYST BP LT 130 MM HG: CPT | Performed by: ANESTHESIOLOGY

## 2025-01-17 PROCEDURE — 3078F DIAST BP <80 MM HG: CPT | Performed by: ANESTHESIOLOGY

## 2025-01-17 PROCEDURE — 99213 OFFICE O/P EST LOW 20 MIN: CPT | Performed by: ANESTHESIOLOGY

## 2025-01-17 PROCEDURE — G2211 COMPLEX E/M VISIT ADD ON: HCPCS | Performed by: ANESTHESIOLOGY

## 2025-01-17 ASSESSMENT — ENCOUNTER SYMPTOMS
LOSS OF SENSATION IN FEET: 0
CARDIOVASCULAR NEGATIVE: 1
ENDOCRINE NEGATIVE: 1
RESPIRATORY NEGATIVE: 1
BACK PAIN: 1
HEMATOLOGIC/LYMPHATIC NEGATIVE: 1
PSYCHIATRIC NEGATIVE: 1
GASTROINTESTINAL NEGATIVE: 1
DEPRESSION: 0
CONSTITUTIONAL NEGATIVE: 1
NEUROLOGICAL NEGATIVE: 1
EYES NEGATIVE: 1
OCCASIONAL FEELINGS OF UNSTEADINESS: 0

## 2025-01-17 ASSESSMENT — PAIN DESCRIPTION - DESCRIPTORS: DESCRIPTORS: DULL

## 2025-01-17 ASSESSMENT — PAIN - FUNCTIONAL ASSESSMENT: PAIN_FUNCTIONAL_ASSESSMENT: 0-10

## 2025-01-17 ASSESSMENT — PAIN SCALES - GENERAL
PAINLEVEL_OUTOF10: 2
PAINLEVEL_OUTOF10: 2

## 2025-01-17 NOTE — PROGRESS NOTES
PAIN MANAGEMENT FOLLOW-UP OFFICE NOTE    Date of Service: 1/17/2025    SUBJECTIVE    CHIEF COMPLAINT: LBP    HISTORY OF PRESENT ILLNESS    Shaheen Ambriz is a 59 y.o. male with PMH mild intermittent asthma, NIDDM2, factor V Leiden on ASA 81 mg, NAFLD, obesity who presents for F/U    On 12/12, pt underwent L4-5 ILESi with 90% ongoing relief. Notes minimal back and LE pain. Denies complications. Happy with pain control.     Pt denies new-onset numbness, weakness, bowel/bladder incontinence.  Pt denies recent infection, allergy to Latex/iodine/contrast. Patient is currently taking the following blood thinner(s): N/A    Procedure log:  -L4-5 ILESI 12/12/24: 90% ongoing  -Left L4-5, l5-S1 TFESI 10/31/24: 90% relief 3 w      REVIEW OF SYSTEMS  Review of Systems   Constitutional: Negative.    HENT: Negative.     Eyes: Negative.    Respiratory: Negative.     Cardiovascular: Negative.    Gastrointestinal: Negative.    Endocrine: Negative.    Musculoskeletal:  Positive for back pain.   Skin: Negative.    Neurological: Negative.    Hematological: Negative.    Psychiatric/Behavioral: Negative.         PAST MEDICAL HISTORY  Past Medical History:   Diagnosis Date    Anxiety     Asthma     triggers: dust, weather changes-last use 9/2023    Diabetes mellitus (Multi)     A1C=7.1 10/12/23    Diverticulosis     multiple diverticulitis flares in past year    Factor V Leiden (Multi)     daily ASA, no hematologist, has been tx w/ Lovenox in past post op    GERD (gastroesophageal reflux disease)     Hyperlipidemia     Hypertension     Nephrolithiasis     last 2007    Sinusitis     r/t allergies    Syncope 09/2022    single episode, evaluated by Dr. Davey Garcia.  ECHO 9/12/22 WNL, Zio monitor WNL per patient.  No recurrance of symptoms     Past Surgical History:   Procedure Laterality Date    KNEE SURGERY Right 06/2023    LITHOTRIPSY  2007    SHOULDER Left 06/2019    clavical decompression    SIGMOIDECTOMY  02/07/2024    UMBILICAL  HERNIA REPAIR  12/2021    VASECTOMY  2006     Family History   Problem Relation Name Age of Onset    Pancreatic cancer Mother      No Known Problems Father      No Known Problems Brother      Hypertension Other grandparent        CURRENT MEDICATIONS  Current Outpatient Medications   Medication Sig Dispense Refill    albuterol 90 mcg/actuation inhaler INHALE 2 PUFFS INTO THE LUNGS EVERY 4 HOURS AS NEEDED FOR 90 DAYS 18 g 3    albuterol sulfate (Proair Digihaler) 90 mcg/actuation aero powdr breath act w/sensor inhaler Inhale 2 puffs every 4 hours.      aspirin 81 mg EC tablet Take 1 tablet (81 mg) by mouth once daily.      atorvastatin (Lipitor) 20 mg tablet Take 1 tablet (20 mg) by mouth once daily. 90 tablet 3    busPIRone (Buspar) 5 mg tablet Take 1 tablet (5 mg) by mouth every 12 hours.      co-enzyme Q-10 30 mg capsule Take 1 capsule (30 mg) by mouth once daily.      FLUoxetine (PROzac) 40 mg capsule TAKE 1 CAPSULE BY MOUTH EVERY DAY 90 capsule 1    fluticasone propion-salmeteroL (Advair Diskus) 250-50 mcg/dose diskus inhaler INHALE 1 PUFF BY MOUTH TWICE A  each 2    FreeStyle Indy 3 Sensor device 1 each by Does not apply route every 14 (fourteen) days. 2 each 11    glimepiride (Amaryl) 2 mg tablet TAKE 1 TABLET BY MOUTH EVERY DAY AS DIRECTED 90 tablet 3    loratadine (Claritin) 10 mg tablet Take 1 tablet (10 mg) by mouth once daily.      montelukast (Singulair) 10 mg tablet TAKE 1 TABLET BY MOUTH EVERY DAY IN THE EVENING 90 tablet 1    omeprazole OTC (PriLOSEC OTC) 20 mg EC tablet Take 1 capsule by mouth once daily.      traZODone (Desyrel) 50 mg tablet TAKE 1 TABLET (50 MG) BY MOUTH ONCE DAILY AT BEDTIME. 90 tablet 1    famotidine (Pepcid) 20 mg tablet Take 1 tablet (20 mg) by mouth 2 times a day for 10 days. (Patient not taking: Reported on 12/12/2024) 20 tablet 0     No current facility-administered medications for this visit.       ALLERGIES AND DRUG REACTIONS  Allergies   Allergen Reactions     Dulaglutide Anxiety and GI Upset     Critically High    Iodinated Contrast Media Anaphylaxis, Hives and Itching    Metformin Hcl GI Upset    Theophylline Palpitations and GI Upset          OBJECTIVE  Visit Vitals  /64   Pulse 87   SpO2 98%   Smoking Status Never       Last Recorded Pain Score (if available):           Pain Score:   2       Physical Exam  Vitals and nursing note reviewed.       General: Sitting in chair, NAD  Head: NCAT  Eyes: Sclera/conjunctiva clear, EOMI, PERRL  Nose/mouth: MMM  CV: Good distal pulses  Lungs: Good/equal chest excursion  Abdomen: Soft, ND  Ext: No cyanosis/edema  MSK: L-spine alignment: unremarkable, BL paraspinal m NTTP, L-spine ROM: full    Neuro: AAOx3, CN grossly nl  Dermatome sensation to light touch  LEFT L1 (lower pelvis/upper thigh): WNL    RIGHT L1: WNL      LEFT L2 (upper thigh): WNL       RIGHT: L2:WNL      LEFT L3 (medial knee): WNL       RIGHT L3: WNL      LEFT L4 (superior medial malleolus): WNL       RIGHT L4: WNL      LEFT L5 (dorsal foot): WNL       RIGHT L5: WNL      LEFT S1 (lateral foot): WNL     RIGHT S1: WNL      LEFT S2 (popliteal fossa): WNL    RIGHT S2: WNL        Motor strength  LEFT L2 (hip flexion): 5/5   RIGHT L2: 5/5  LEFT L3 (knee extension): 5/5     RIGHT L3: 5/5  LEFT L4 (dorsiflexion): 5/5     RIGHT L4: 5/5  LEFT L5 (EHL extension): 5/5     RIGHT L5: 5/5  LEFT S1 (plantarflexion): 5/5     RIGHT S1: 5/5  LEFT S2 (knee flexion): 5/5      RIGHT S2: 5/5    Special testing  DTR unremarkable  Seated slump test resolved LLE    Psych: affect nl  Skin: no rash/lesions      REVIEW OF LABORATORY DATA  I have reviewed the following lab results:  WBC   Date Value Ref Range Status   08/25/2024 10.3 4.4 - 11.3 x10*3/uL Final     RBC   Date Value Ref Range Status   08/25/2024 5.45 4.50 - 5.90 x10*6/uL Final     Hemoglobin   Date Value Ref Range Status   08/25/2024 13.9 13.5 - 17.5 g/dL Final     Hematocrit   Date Value Ref Range Status   08/25/2024 44.9 41.0  - 52.0 % Final     MCV   Date Value Ref Range Status   08/25/2024 82 80 - 100 fL Final     MCH   Date Value Ref Range Status   08/25/2024 25.5 (L) 26.0 - 34.0 pg Final     MCHC   Date Value Ref Range Status   08/25/2024 31.0 (L) 32.0 - 36.0 g/dL Final     RDW   Date Value Ref Range Status   08/25/2024 14.9 (H) 11.5 - 14.5 % Final     Platelets   Date Value Ref Range Status   08/25/2024 412 150 - 450 x10*3/uL Final     MPV   Date Value Ref Range Status   07/24/2023 8.7 7.0 - 12.6 CU Final     Sodium   Date Value Ref Range Status   08/25/2024 140 133 - 145 mmol/L Final     Potassium   Date Value Ref Range Status   08/25/2024 3.7 3.4 - 5.1 mmol/L Final     Bicarbonate   Date Value Ref Range Status   08/25/2024 21 (L) 24 - 31 mmol/L Final     Urea Nitrogen   Date Value Ref Range Status   08/25/2024 13 8 - 25 mg/dL Final     Calcium   Date Value Ref Range Status   08/25/2024 8.7 8.5 - 10.4 mg/dL Final     Protime   Date Value Ref Range Status   02/09/2019 9.8 9.3 - 12.7 SEC Final     INR   Date Value Ref Range Status   02/09/2019 0.9 0.86 - 1.16 Final     Comment:     INR Therapeutic Range: 2.0-3.5  Performed at [address] [address]           REVIEW OF RADIOLOGY   I have reviewed the following:  Radiology Studies           MRI L-spine 8/15/24:           ASSESSMENT & PLAN  Shaheen Ambriz is a 59 y.o.male with PMH  mild intermittent asthma, NIDDM2, factor V Leiden on ASA 81 mg, NAFLD, obesity who presents for F/U    1) LBP  ->15 y LBP radiating to L posterior thigh with +LLE seated slump  -Refractive to yrs of conservative tx including Tylenol, NSAIDs, IcyHot, >6 w PT, MDP, gabapentin  -MRI L-spine 8/15/24: multilevel spondylosis featuring L4-5 disc complex contributing to mild left L4-5 NFS  -L4-5 ILESI 12/12/24: 90% ongoing relief. Pt pleased w/ pain control.  -F/U 3 mo        Today's visit involved continuation of chronic pain care. In the context of the complexity of this patient's chronic pain  diagnosis, long-term expectations and care planning discussed. Adequate time taken to ensure patient understanding and answer questions. Imaging studies ordered are placed do elucidate the patient's diagnosis, but also to evaluate the patient's candidacy for procedural and surgical interventions. The risks and benefits of these potential interventions are detailed as above.                      Violeta Rivera MD  Anesthesiologist & Interventional Pain Physician   [facility]  O: [phone]  F: [phone]  8:26 AM  01/17/25

## 2025-01-20 ENCOUNTER — PATIENT MESSAGE (OUTPATIENT)
Dept: PRIMARY CARE | Facility: CLINIC | Age: 60
End: 2025-01-20
Payer: COMMERCIAL

## 2025-01-20 DIAGNOSIS — F32.A DEPRESSION, UNSPECIFIED DEPRESSION TYPE: ICD-10-CM

## 2025-01-20 RX ORDER — FLUOXETINE HYDROCHLORIDE 40 MG/1
40 CAPSULE ORAL DAILY
Qty: 90 CAPSULE | Refills: 1 | Status: SHIPPED | OUTPATIENT
Start: 2025-01-20

## 2025-01-24 ENCOUNTER — HOSPITAL ENCOUNTER (EMERGENCY)
Facility: HOSPITAL | Age: 60
Discharge: HOME | End: 2025-01-24
Attending: STUDENT IN AN ORGANIZED HEALTH CARE EDUCATION/TRAINING PROGRAM
Payer: COMMERCIAL

## 2025-01-24 ENCOUNTER — APPOINTMENT (OUTPATIENT)
Dept: RADIOLOGY | Facility: HOSPITAL | Age: 60
End: 2025-01-24
Payer: COMMERCIAL

## 2025-01-24 VITALS
DIASTOLIC BLOOD PRESSURE: 91 MMHG | OXYGEN SATURATION: 98 % | HEART RATE: 99 BPM | SYSTOLIC BLOOD PRESSURE: 146 MMHG | BODY MASS INDEX: 36.94 KG/M2 | WEIGHT: 272.71 LBS | TEMPERATURE: 98.1 F | HEIGHT: 72 IN | RESPIRATION RATE: 16 BRPM
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
## 2025-01-24 DIAGNOSIS — S09.90XA HEAD INJURY, INITIAL ENCOUNTER: Primary | ICD-10-CM

## 2025-01-24 PROCEDURE — 99284 EMERGENCY DEPT VISIT MOD MDM: CPT | Mod: 25 | Performed by: STUDENT IN AN ORGANIZED HEALTH CARE EDUCATION/TRAINING PROGRAM

## 2025-01-24 PROCEDURE — 72125 CT NECK SPINE W/O DYE: CPT | Performed by: RADIOLOGY

## 2025-01-24 PROCEDURE — 72125 CT NECK SPINE W/O DYE: CPT

## 2025-01-24 PROCEDURE — 70450 CT HEAD/BRAIN W/O DYE: CPT | Performed by: RADIOLOGY

## 2025-01-24 PROCEDURE — 70450 CT HEAD/BRAIN W/O DYE: CPT

## 2025-01-24 ASSESSMENT — PAIN DESCRIPTION - PROGRESSION: CLINICAL_PROGRESSION: GRADUALLY WORSENING

## 2025-01-24 ASSESSMENT — PAIN DESCRIPTION - PAIN TYPE: TYPE: ACUTE PAIN

## 2025-01-24 ASSESSMENT — PAIN DESCRIPTION - LOCATION: LOCATION: HEAD

## 2025-01-24 ASSESSMENT — PAIN DESCRIPTION - ONSET: ONSET: GRADUAL

## 2025-01-24 ASSESSMENT — PAIN DESCRIPTION - FREQUENCY: FREQUENCY: CONSTANT/CONTINUOUS

## 2025-01-24 ASSESSMENT — PAIN DESCRIPTION - DESCRIPTORS: DESCRIPTORS: ACHING

## 2025-01-24 ASSESSMENT — PAIN SCALES - GENERAL: PAINLEVEL_OUTOF10: 4

## 2025-01-24 ASSESSMENT — PAIN - FUNCTIONAL ASSESSMENT: PAIN_FUNCTIONAL_ASSESSMENT: 0-10

## 2025-01-25 NOTE — ED PROVIDER NOTES
HPI   Chief Complaint   Patient presents with    Head Injury     Slipped on ice at 0630 no LOC hit head, small abrasion on scalp, stiff neck, good MSPs, feels whoozy, no vomit or diarrhea , on daily Aspirin       This is a 59-year-old male presenting the ED for evaluation of head injury after a fall.  He does not take any blood thinners, he states that he was taking his trash out and slipped on some ice in the driveway, he fell forward and hit his head up against the handle of the trash can that he was taking out.  He did not lose consciousness but did feel lightheaded afterwards, he complains of small abrasion to the scalp, pain to the area where his head hit the trash can as well as some stiffness and soreness in the neck.  He did not sustain any injuries to the extremities.  Last tetanus shot was within the past 5 years per his report.       History provided by:  Patient   used: No            Patient History   Past Medical History:   Diagnosis Date    Anxiety     Asthma     triggers: dust, weather changes-last use 9/2023    Diabetes mellitus (Multi)     A1C=7.1 10/12/23    Diverticulosis     multiple diverticulitis flares in past year    Factor V Leiden (Multi)     daily ASA, no hematologist, has been tx w/ Lovenox in past post op    GERD (gastroesophageal reflux disease)     Hyperlipidemia     Hypertension     Nephrolithiasis     last 2007    Sinusitis     r/t allergies    Syncope 09/2022    single episode, evaluated by Dr. Davey Garcia.  ECHO 9/12/22 WNL, Zio monitor WNL per patient.  No recurrance of symptoms     Past Surgical History:   Procedure Laterality Date    KNEE SURGERY Right 06/2023    LITHOTRIPSY  2007    SHOULDER Left 06/2019    clavical decompression    SIGMOIDECTOMY  02/07/2024    UMBILICAL HERNIA REPAIR  12/2021    VASECTOMY  2006     Family History   Problem Relation Name Age of Onset    Pancreatic cancer Mother      No Known Problems Father      No Known Problems Brother       Hypertension Other grandparent      Social History     Tobacco Use    Smoking status: Never    Smokeless tobacco: Never   Vaping Use    Vaping status: Never Used   Substance Use Topics    Alcohol use: Yes     Comment: pt drinks an ocassional glass of wine    Drug use: Never       Physical Exam   ED Triage Vitals [01/24/25 0909]   Temperature Heart Rate Respirations BP   36.7 °C (98.1 °F) 99 16 (!) 146/91      Pulse Ox Temp Source Heart Rate Source Patient Position   98 % Oral Monitor Sitting      BP Location FiO2 (%)     Right arm --       Physical Exam  General: well developed, well nourished adult male who is awake and alert, in no apparent distress.  Wife bedside.  Eyes: sclera clear bilaterally, PERRL, EOMI  HENT: normocephalic, small abrasion within the hairline at the upper frontal scalp.  No tenderness over the facial bones.  Pharynx without erythema or exudates, uvula midline.  No visible oral or dental injury.  CV: regular rate and rhythm, no murmur, no gallops, or rubs.   MSK: No midline spinal tenderness, mild tenderness to the paraspinal muscles in the upper cervical region.  No obvious deformity or injury to the extremities, no swelling of the extremities..  Psych: appropriate mood and affect, cooperative with exam  Skin: warm, dry, abrasion to the frontal scalp within the hairline.  No lacerations.  No active bleeding.    ED Course & MDM   Diagnoses as of 01/25/25 0827   Head injury, initial encounter   Fall, initial encounter                 No data recorded     [place] Coma Scale Score: 15 (01/24/25 0915 : Juana Bailey RN)                           Medical Decision Making  Patient presents the ED for evaluation of injuries after mechanical fall.  See HPI for details of how the patient fell.  The patient had no shortness of breath, dizziness, lightheadedness, chest pain or palpitations, no symptoms suggestive of arrhythmia, acute coronary syndrome, any other acute medical cause for their fall  today.  No indication for extensive medical workup today.  Imaging ordered to assess for evidence of acute injury from the fall.    Physical exam shows tenderness of paraspinal muscles in the upper cervical region.  Small abrasion to the upper frontal scalp, no laceration requiring closure.  No active bleeding.  No bony tenderness over the facial bones.  No visible or palpable cephalhematoma or skull deformity.    Patient reports minimal pain at this time.  CT of the head and cervical spine reviewed, there is no intracranial hemorrhage, no skull fracture.  No C-spine injury identified on imaging.  Results reviewed with the patient and his wife at bedside.  Patient discharged in stable condition.  Return precautions discussed and written precautions given including severe sudden onset headache, any new neurologic deficits or severe worsening of his pain that would require reassessment by physician.    CT head wo IV contrast   Final Result   No acute intracranial abnormality.             MACRO:   none        Signed by: Thao Webber 1/24/2025 9:41 AM   Dictation workstation:   [id number]      CT cervical spine wo IV contrast   Final Result   No obvious fracture.             MACRO:   None        Signed by: Thao Webber 1/24/2025 9:43 AM   Dictation workstation:   [id number]            Procedure  Procedures     Ihsan Thomspon DO  01/25/25 0834

## 2025-01-30 DIAGNOSIS — F32.A DEPRESSION, UNSPECIFIED DEPRESSION TYPE: ICD-10-CM

## 2025-01-30 RX ORDER — TRAZODONE HYDROCHLORIDE 50 MG/1
50 TABLET ORAL NIGHTLY
Qty: 90 TABLET | Refills: 1 | Status: SHIPPED | OUTPATIENT
Start: 2025-01-30

## 2025-01-31 ENCOUNTER — TELEMEDICINE (OUTPATIENT)
Dept: PRIMARY CARE | Facility: CLINIC | Age: 60
End: 2025-01-31
Payer: COMMERCIAL

## 2025-01-31 DIAGNOSIS — J01.90 ACUTE NON-RECURRENT SINUSITIS, UNSPECIFIED LOCATION: Primary | ICD-10-CM

## 2025-01-31 PROCEDURE — 99214 OFFICE O/P EST MOD 30 MIN: CPT | Performed by: FAMILY MEDICINE

## 2025-01-31 RX ORDER — AMOXICILLIN AND CLAVULANATE POTASSIUM 875; 125 MG/1; MG/1
875 TABLET, FILM COATED ORAL 2 TIMES DAILY
Qty: 14 TABLET | Refills: 0 | Status: SHIPPED | OUTPATIENT
Start: 2025-01-31 | End: 2025-02-07

## 2025-01-31 RX ORDER — LORAZEPAM 0.5 MG/1
TABLET ORAL
COMMUNITY
Start: 2024-10-21

## 2025-01-31 NOTE — PATIENT INSTRUCTIONS
"Sinusitis:  Sinus sxs for at least 7 days and getting worse or 10 days with no improvement  Augmentin 875mg twice a day for 7 days    Please send me a Heavyt message if you have any questions or concerns.  FOR NON URGENT questions only.  Allow up to 72 hours for response.    If you have prescription issues or other questions you can email   Hailey Alonzo  Digital Health Coordinator, at   [email or web address]     Rest and drink plenty of fluids    Tylenol and or motrin as needed for pain and fever (unless you have been told not to take these because of your personal medical history)    Discussed options and precautions (complaint specific and may include)  Viral versus bacterial infection; use of medications; possible side effects; appropriate over-the-counter medications; possible complications and /or when to follow-up.    Limitations to telemedicine include inability to do a complete and accurate physical exam.  Any concerns regarding this were conveyed with the patient and in person follow-up recommended if patient nature of illness does not progress as anticipated during this visit.  Red flags requiring in person care were discussed.     if sent for in person care at  or ED,  it was explained why and failure to have \"higher level of care\" further evaluation, and treatment today may lead, but is not limited to negative outcomes, permanent disability, or even death.     If not sent for in person care today, follow-up with your PCP in 2-3 days, sooner if not  improving.    Follow-up immediately if symptoms worsen. If experiencing symptoms including but not limited to lethargy / chest pain / weakness / dizziness / difficulty breathing please call 911 or go to the emergency department for immediate care.     All patient's questions were answered. Patient has good decision making capacity.  They are alert to person, place, time and situation.  Patient has the ability to communicate choice, " understand information, consequences, and reason rationally.    _________________________________________________________________________________________________________________  To connect with a new PCP please visit [email or web address] or call [phone]        FOR THOSE WITH SYMPTOMS OF ILLNESS/UPPER RESPIRATORY INFECTION/COVID    CDC updated Respiratory Infection guidelines:   When you have a respiratory virus, stay home and away from others (including people  you live with who are not sick) Symptoms can include fever, chills, fatigue, cough,  runny nose, and headache (and others).    You may return to work when the following 3 conditions are met:    1) No fever without the use of a fever reducer for 24 hours  2 symptoms are overall improving AND  3) symptoms are mild     When you go back to your normal activities, take added precaution over the next 5 days, such as taking additional steps for  air, hygiene, masks, physical distancing, and/or testing when you will be around other people indoors.    Keep in mind that you may still be able to spread the virus that made you sick, even if you are feeling better. You are likely to be less contagious at this time, depending on factors like how long you were sick or how sick you were.    If you develop a fever or you start to feel worse after you have gone back to normal activities, stay home and away from others again until, for at least 24 hours, both are true: your symptoms are improving overall, and you have not had a fever (and are not using fever-reducing medication). Then take added precaution for the next 5 days.    If you never had symptoms but tested positive for a respiratory virus?, you may be contagious. For the next 5 days: take added precaution, such as taking additional steps for  air, hygiene, masks, physical distancing, and/or testing when you will be around other people indoors. This is especially  important to protect people with factors that increase their risk of severe illness from respiratory viruses.  Avoid immunocompromised, elderly, pregnant women, infants etc     Over-the-counter cold and cough medications    Take Mucinex for cough, drink plenty of fluids with this medication and will help break up congestion making it easier to cough up    For cough can use honey (children ages 1 and up) in hot tea or hot water. I recommend putting this in an insulated cup and carrying it around throughout the day to sip on.  Have it at your bedside at night in case you wake up coughing.  You can also use honey cough drops (adults and older children).    Recommend nasal saline for use in children and adults.  Neti Verdugo can also be helpful.  (Never used tap water and a Neti pot.  Use distilled water.)    If you have plugged up congested ears or the feeling of fluid in your ears, you can use an over-the-counter nasal steroid spray like fluticasone (brand name Flonase) use 2 sprays into each nostril once or twice a day for 7 days.  This will help open up the eustachian tubes and allow the fluid to drain out of your ears.    Sleeping with your head/chest elevated can help with sinus drainage.    Adults only-can use nasal decongestant (like Afrin) at bedtime to open nasal passages so you can breathe through your nose while you sleep; avoid using for longer than 3 days as this medication can become addicting.  Do not use if you have high blood pressure or high heart rate.    For severe pain or fever in adult-Tylenol (2 extra strength) or ibuprofen (3-4 tabs equals 600 to 800 mg) alternating as needed for pain.  Tylenol doses should be 6 to 8 hours apart and ibuprofen doses should be 6 to 8 hours apart.      Common cold medications for adults explained:    **Cold medications for children are not recommended-only Tylenol, Motrin, honey (only for children older than 1 year), cool-mist humidifier, and nasal saline spray are  recommended for children.    Mucinex-(generic name guaifenesin)-is an expectorant.  This will thin out all the thick mucus.  Must drink plenty of liquids for this medicine to work.    Dextromethorphan (brand name Delsym or DM)-this medicine is a cough suppressant--caution/avoid use if taking SSRI medication because of risk of Serotonin Syndrome    Honey in hot water or tea-this is a natural cough suppressant    Decongestant nasal spray- (eg: Afrin) use for temporary relief of nasal congestion-best when used at bedtime to open up nasal passages so that you are not forced to mouth breathe overnight.    Sudafed (generic name pseudoephedrine-this must be bought from the pharmacist) DO NOT use this medicine if you have high blood pressure as it can raise your blood pressure higher.  Do not use if you have any irregular heart rate.  This medicine can help clear congestion in your sinuses.

## 2025-01-31 NOTE — PROGRESS NOTES
I performed this visit using real-time telehealth tools, including an audio/video OR telephone connection between the patient listed who was located in the [facility] and myself, Ivania Galan (Board certified in the [facility]).At the start of the visit, I introduced myself as Dr. Garcia and verified the patients name, , and current physical location.  If they were currently outside of the state of [place], the visit was ended and the patient was referred to alternative means for evaluation and treatment.  The patient was made aware of the limitations of the telehealth visit.  They will not be physically examined and all issues may not be appropriate for a telehealth visit.  If necessary, an in-    In preparing for this visit and writing this note, I reviewed previous electronic medical records (labs, imaging and medical charts) of the patient available in the physician portal. Significant findings which helped in decision making are recorded in this encounter charting.  All allergies were reviewed with the patient and all medications reconciled verbally with the patient at the beginning oft the visit.    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 no  Alert, oriented, pleasant, in no apparent distress? yes  Answers questions appropriately? yes  Eyes clear? yes  Is patient in respiratory distress? no  Throat exam: not available  Is patient coughing  during consult: no  Audible wheezing noted? : no  Pt sounds congested?:  no  Sniffing or rhinorrhea?:  no  Frontal sinus TTP by palpation?  NO  Maxillary sinus TTP by palpation? NO  Psychiatric: Affect normal? yes  Other relevant physical exam:    Assessment and Plan:    Sinusitis:  Sinus sxs for at least 7 days and getting worse or 10 days with no improvement  Augmentin 875mg twice a day for 7 days      Discussed with patient during visit  differential diagnosis; viral versus bacterial infection;  (if relevant); use of medications prescribed and possible side effects; appropriate over-the-counter medications; possible complications ; when to follow-up for in person evaluation.  All patient's questions were answered. Patient has good decision making capacity.  They are alert to person, place, time and situation.  Patient has the ability to communicate choice, understand information, consequences, and reason rationally.  If sent for in person care at  or ED,    I explained why Urgent in person exam was necessary and that failure to have further evaluation, and treatment today may lead to, negative outcomes, permanent disability, or even death.   If not sent for in person care today,   follow-up with your PCP in 2-3 days, sooner if not  improving.    Follow-up immediately if symptoms worsen.   If experiencing symptoms including but not limited to lethargy / chest pain / weakness / dizziness / difficulty breathing please call 911 or go to the closest emergency department for immediate care.   Limitations to telemedicine include inability to do a complete and accurate physical exam.    Any concerns regarding this were conveyed with the patient and in person follow-up recommended if the nature of their concern/illness does not progress as anticipated during this visit.

## 2025-02-03 ENCOUNTER — OFFICE VISIT (OUTPATIENT)
Dept: URGENT CARE | Age: 60
End: 2025-02-03
Payer: COMMERCIAL

## 2025-02-03 ENCOUNTER — ANCILLARY PROCEDURE (OUTPATIENT)
Dept: URGENT CARE | Age: 60
End: 2025-02-03
Payer: COMMERCIAL

## 2025-02-03 VITALS
WEIGHT: 265 LBS | OXYGEN SATURATION: 95 % | SYSTOLIC BLOOD PRESSURE: 137 MMHG | RESPIRATION RATE: 17 BRPM | BODY MASS INDEX: 35.94 KG/M2 | DIASTOLIC BLOOD PRESSURE: 87 MMHG | TEMPERATURE: 98.1 F | HEART RATE: 105 BPM

## 2025-02-03 DIAGNOSIS — J45.901 EXACERBATION OF ASTHMA, UNSPECIFIED ASTHMA SEVERITY, UNSPECIFIED WHETHER PERSISTENT (HHS-HCC): Primary | ICD-10-CM

## 2025-02-03 DIAGNOSIS — R05.1 ACUTE COUGH: ICD-10-CM

## 2025-02-03 DIAGNOSIS — Z86.39 HISTORY OF DIABETES MELLITUS: ICD-10-CM

## 2025-02-03 LAB — POC FINGERSTICK BLOOD GLUCOSE: 138 MG/DL (ref 70–100)

## 2025-02-03 PROCEDURE — 71046 X-RAY EXAM CHEST 2 VIEWS: CPT

## 2025-02-03 RX ORDER — BENZONATATE 200 MG/1
200 CAPSULE ORAL 3 TIMES DAILY PRN
Qty: 21 CAPSULE | Refills: 0 | Status: SHIPPED | OUTPATIENT
Start: 2025-02-03 | End: 2025-02-10

## 2025-02-03 RX ADMIN — Medication 10 MG: at 09:10

## 2025-02-03 NOTE — PROGRESS NOTES
Subjective   Patient ID: Shaheen Ambriz is a 59 y.o. male. They present today with a chief complaint of Cough (Pt states he was seen virtually on 1/31/25. Dx w/sinus infection and bronchitis. Was started on Augmentin. Still has cough and chest congestion. Hx of asthma.).    History of Present Illness  See mdm       History provided by:  Patient   used: No        Past Medical History  Allergies as of 02/03/2025 - Reviewed 02/03/2025   Allergen Reaction Noted    Dulaglutide Anxiety, GI Upset, and Unknown 08/18/2023    Iodinated contrast media Anaphylaxis, Hives, and Itching 12/10/2010    Morphine Other, GI Upset, Nausea And Vomiting, and Nausea/vomiting 05/02/2006    Theophylline GI Upset, Palpitations, and Nausea And Vomiting 05/02/2006    Metformin hcl GI Upset 08/18/2023       (Not in a hospital admission)       Past Medical History:   Diagnosis Date    Anxiety     Asthma     triggers: dust, weather changes-last use 9/2023    Diabetes mellitus (Multi)     A1C=7.1 10/12/23    Diverticulosis     multiple diverticulitis flares in past year    Factor V Leiden (Multi)     daily ASA, no hematologist, has been tx w/ Lovenox in past post op    GERD (gastroesophageal reflux disease)     Hyperlipidemia     Hypertension     Nephrolithiasis     last 2007    Sinusitis     r/t allergies    Syncope 09/2022    single episode, evaluated by Dr. Davey Garcia.  ECHO 9/12/22 WNL, Zio monitor WNL per patient.  No recurrance of symptoms       Past Surgical History:   Procedure Laterality Date    KNEE SURGERY Right 06/2023    LITHOTRIPSY  2007    SHOULDER Left 06/2019    clavical decompression    SIGMOIDECTOMY  02/07/2024    UMBILICAL HERNIA REPAIR  12/2021    VASECTOMY  2006        reports that he has never smoked. He has never used smokeless tobacco. He reports current alcohol use. He reports that he does not use drugs.    Review of Systems  Review of Systems   All other systems reviewed and are negative.                                  Objective    Vitals:    02/03/25 0806 02/03/25 0905   BP: 137/87    BP Location: Right arm    Patient Position: Sitting    BP Cuff Size: Large adult    Pulse: (!) 115 105   Resp: 17    Temp: 36.7 °C (98.1 °F)    TempSrc: Oral    SpO2: 95%    Weight: 120 kg (265 lb)      No LMP for male patient.    Physical Exam  Vitals and nursing note reviewed.   Constitutional:       General: He is not in acute distress.     Appearance: He is normal weight. He is not ill-appearing, toxic-appearing or diaphoretic.   HENT:      Head: Normocephalic and atraumatic.      Right Ear: Tympanic membrane, ear canal and external ear normal.      Left Ear: Tympanic membrane, ear canal and external ear normal.      Nose: Nose normal.      Mouth/Throat:      Mouth: Mucous membranes are moist.      Pharynx: No oropharyngeal exudate or posterior oropharyngeal erythema.      Comments: Oropharynx is widely patent.  Mucous membranes are moist.  No erythema, exudate, edema or asymmetry of posterior pharynx or tonsils.  Uvula is midline and without edema.  No muffled voice or trismus.   Eyes:      General: No scleral icterus.        Right eye: No discharge.         Left eye: No discharge.      Extraocular Movements: Extraocular movements intact.      Conjunctiva/sclera: Conjunctivae normal.      Pupils: Pupils are equal, round, and reactive to light.   Cardiovascular:      Rate and Rhythm: Normal rate and regular rhythm.      Pulses: Normal pulses.      Heart sounds: No murmur heard.     No friction rub. No gallop.   Pulmonary:      Effort: Pulmonary effort is normal. No respiratory distress.      Breath sounds: No stridor. No wheezing, rhonchi or rales.   Abdominal:      General: Abdomen is flat.      Tenderness: There is no abdominal tenderness. There is no guarding or rebound.   Musculoskeletal:      Cervical back: Normal range of motion and neck supple. No rigidity or tenderness.      Right lower leg: No edema.      Left  lower leg: No edema.      Comments: No calf or popliteal tenderness.  No lower extremity edema. LE WWP, sensation intact capillary fill time less than 2 seconds    Skin:     General: Skin is warm and dry.      Capillary Refill: Capillary refill takes less than 2 seconds.   Neurological:      General: No focal deficit present.      Mental Status: He is alert.   Psychiatric:         Mood and Affect: Mood normal.         Behavior: Behavior normal.         Procedures    Point of Care Test & Imaging Results from this visit  Results for orders placed or performed in visit on 02/03/25   POCT fingerstick glucose manually resulted   Result Value Ref Range    POC Fingerstick Blood Glucose 138 (A) 70 - 100 mg/dl      XR chest 2 views    Result Date: 2/3/2025  Interpreted By:  Khai Dean, STUDY: XR CHEST 2 VIEWS; 2/3/2025 8:34 am   INDICATION: Signs/Symptoms:cough, chest congestion x 1.5 weeks despite augmentin   COMPARISON: August 2024.   ACCESSION NUMBER(S): [id number]   ORDERING CLINICIAN: ESTELA PAN   TECHNIQUE: Number of films: Two-view radiographs of the chest were obtained.   FINDINGS: The heart and mediastinum are normal. The lungs are clear. No pleural effusions are seen. The osseous structures are unremarkable.       Normal chest radiographs.   Signed by: Khai Dean 2/3/2025 8:52 AM Dictation workstation:   [id number]     Diagnostic study results (if any) were reviewed by Estela Pan PA-C.    Assessment/Plan   Allergies, medications, history, and pertinent labs/EKGs/Imaging reviewed by Estela Pan PA-C.     Medical Decision Making  59-year-old male with past medical history of asthma, hyperlipidemia, factor V Leiden on 81mg ASA presents with complaint of cough, congestion, chest congestion for 1.5 weeks.  He has been taking Augmentin after diagnosed with bronchitis, sinusitis with telehealth appointment 4 days ago.  Some of his symptoms have resolved.  He states that sinus symptoms seem  better however he has persistent cough and is using albuterol more frequently than normal.  He does state after using albuterol his symptoms temporarily improve however short time later he feels as though he needs to use it again due to sensation of wheezing.  He states he has heard wheezing particularly while laying in bed after frequent coughing fits.  He used albuterol shortly prior to arrival today.  Exam benign.  I do not appreciate any wheezing, rhonchi or rales.  Good air movement without evidence for respiratory distress or diminished breath sounds.  Patient is well-appearing and nontoxic.  No extremity swelling, chest pain, shortness of breath or other indication for PE/DVT.  He is tachycardic however recheck is improved.  Chest x-ray without acute cardiopulmonary finding.  Suspect mild asthma exacerbation we will treat with Decadron and Tessalon Perles.  As he is not having fevers and chest x-ray is negative do not feel further coverage with antibiotics is needed.  Continue Augmentin as previously prescribed.  Discussed strict presentation to ED for new or worsening symptoms particular chest pain or shortness of breath, hemoptysis, extremity swelling or pain, lightheadedness, palpitations or any other new or worsening symptoms.  Encouraged follow-up with primary care provider.  Discussed indications for presentation to emergency department.  Discharged good condition agreeable to plan as discussed.     Orders and Diagnoses  Diagnoses and all orders for this visit:  Exacerbation of asthma, unspecified asthma severity, unspecified whether persistent ([facility])  -     dexAMETHasone (Decadron) 10 mg/mL oral liquid 10 mg  Acute cough  -     XR chest 2 views; Future  -     benzonatate (Tessalon) 200 mg capsule; Take 1 capsule (200 mg) by mouth 3 times a day as needed for cough for up to 7 days. Do not crush or chew.  History of diabetes mellitus  -     POCT fingerstick glucose manually resulted      Medical Admin  Record  Administrations This Visit       dexAMETHasone (Decadron) 10 mg/mL oral liquid 10 mg       Admin Date  02/03/2025 Action  Given Dose  10 mg Route  oral Documented By  Kamla Barker MA                    Patient disposition: Home    Electronically signed by Estela Pan PA-C  1:46 PM

## 2025-02-03 NOTE — PATIENT INSTRUCTIONS
Go to emergency department for new or worsening symptoms such as chest pain, SOB, palpitations, extremity swelling or any other symptoms which concern you.

## 2025-02-17 ENCOUNTER — APPOINTMENT (OUTPATIENT)
Dept: GASTROENTEROLOGY | Facility: CLINIC | Age: 60
End: 2025-02-17
Payer: COMMERCIAL

## 2025-02-25 ASSESSMENT — ENCOUNTER SYMPTOMS
WHEEZING: 0
RHINORRHEA: 0
HEADACHES: 0
SHORTNESS OF BREATH: 0
SWEATS: 0
COUGH: 1
HEARTBURN: 0
WEIGHT LOSS: 0
SORE THROAT: 0
FEVER: 0
MYALGIAS: 0
CHILLS: 0
HEMOPTYSIS: 0

## 2025-02-27 DIAGNOSIS — J45.909 UNSPECIFIED ASTHMA, UNCOMPLICATED (HHS-HCC): ICD-10-CM

## 2025-02-27 RX ORDER — MONTELUKAST SODIUM 10 MG/1
10 TABLET ORAL EVERY EVENING
Qty: 90 TABLET | Refills: 1 | Status: SHIPPED | OUTPATIENT
Start: 2025-02-27

## 2025-03-04 ENCOUNTER — APPOINTMENT (OUTPATIENT)
Dept: PRIMARY CARE | Facility: CLINIC | Age: 60
End: 2025-03-04
Payer: COMMERCIAL

## 2025-03-04 VITALS
RESPIRATION RATE: 20 BRPM | HEART RATE: 67 BPM | OXYGEN SATURATION: 97 % | DIASTOLIC BLOOD PRESSURE: 78 MMHG | HEIGHT: 72 IN | BODY MASS INDEX: 35.89 KG/M2 | TEMPERATURE: 98.7 F | WEIGHT: 265 LBS | SYSTOLIC BLOOD PRESSURE: 122 MMHG

## 2025-03-04 DIAGNOSIS — R05.2 SUBACUTE COUGH: Primary | ICD-10-CM

## 2025-03-04 PROCEDURE — 99213 OFFICE O/P EST LOW 20 MIN: CPT | Performed by: FAMILY MEDICINE

## 2025-03-04 PROCEDURE — 1036F TOBACCO NON-USER: CPT | Performed by: FAMILY MEDICINE

## 2025-03-04 PROCEDURE — 3074F SYST BP LT 130 MM HG: CPT | Performed by: FAMILY MEDICINE

## 2025-03-04 PROCEDURE — 3078F DIAST BP <80 MM HG: CPT | Performed by: FAMILY MEDICINE

## 2025-03-04 PROCEDURE — 3008F BODY MASS INDEX DOCD: CPT | Performed by: FAMILY MEDICINE

## 2025-03-04 RX ORDER — BENZONATATE 200 MG/1
200 CAPSULE ORAL 3 TIMES DAILY PRN
Qty: 42 CAPSULE | Refills: 3 | Status: SHIPPED | OUTPATIENT
Start: 2025-03-04 | End: 2025-08-31

## 2025-03-04 RX ORDER — BENZONATATE 100 MG/1
100 CAPSULE ORAL 3 TIMES DAILY PRN
COMMUNITY
End: 2025-03-04 | Stop reason: DRUGHIGH

## 2025-03-04 RX ORDER — PREDNISONE 20 MG/1
TABLET ORAL
Qty: 20 TABLET | Refills: 0 | Status: SHIPPED | OUTPATIENT
Start: 2025-03-04 | End: 2025-03-17

## 2025-03-04 ASSESSMENT — ENCOUNTER SYMPTOMS
LOSS OF SENSATION IN FEET: 0
FEVER: 0
SWEATS: 0
MYALGIAS: 0
WEIGHT LOSS: 0
RHINORRHEA: 0
OCCASIONAL FEELINGS OF UNSTEADINESS: 0
COUGH: 1
HEMOPTYSIS: 0
HEARTBURN: 0
HEADACHES: 0
SHORTNESS OF BREATH: 0
DEPRESSION: 0
SORE THROAT: 0
CHILLS: 0
WHEEZING: 0

## 2025-03-04 ASSESSMENT — PAIN SCALES - GENERAL: PAINLEVEL_OUTOF10: 0-NO PAIN

## 2025-03-04 NOTE — PROGRESS NOTES
Subjective   Patient ID: Shaheen Ambriz is a 59 y.o. male who presents for Cough (PATIENT HERE BECAUSE HE HAD FLU A IN JANUARY AND SINCE  THEN HE HAS HAD A COUGH THAT WONT GO AWAY).    Cough  This is a recurrent problem. The current episode started 1 to 4 weeks ago. The problem has been waxing and waning. The problem occurs constantly. The cough is Non-productive. Pertinent negatives include no chest pain, chills, ear congestion, ear pain, fever, headaches, heartburn, hemoptysis, myalgias, nasal congestion, postnasal drip, rash, rhinorrhea, sore throat, shortness of breath, sweats, weight loss or wheezing. The symptoms are aggravated by cold air, dust, fumes and pollens.    He had the flu at the end of Jan and has had ongoing cough since then.   He has a hx of asthma and initially was using his rescue a lot and is also on Wixela.  Has tried otc and given tessalon.  Cxr early Feb wnl.   No night cough.     Review of Systems   Constitutional:  Negative for chills, fever and weight loss.   HENT:  Negative for ear pain, postnasal drip, rhinorrhea and sore throat.    Respiratory:  Positive for cough. Negative for hemoptysis, shortness of breath and wheezing.    Cardiovascular:  Negative for chest pain.   Gastrointestinal:  Negative for heartburn.   Musculoskeletal:  Negative for myalgias.   Skin:  Negative for rash.   Neurological:  Negative for headaches.       Objective   /78 (BP Location: Right arm, Patient Position: Sitting, BP Cuff Size: Adult)   Pulse 67   Temp 37.1 °C (98.7 °F) (Skin)   Resp 20   Ht 1.829 m (6')   Wt 120 kg (265 lb)   SpO2 97%   BMI 35.94 kg/m²     Physical Exam  Constitutional:       General: He is not in acute distress.     Appearance: Normal appearance.   Cardiovascular:      Rate and Rhythm: Normal rate and regular rhythm.      Heart sounds: No murmur heard.  Pulmonary:      Breath sounds: Normal breath sounds. No wheezing.   Neurological:      Mental Status: He is alert.          Assessment/Plan   Problem List Items Addressed This Visit    None  Visit Diagnoses         Codes    Subacute cough    -  Primary R05.2    Relevant Medications    predniSONE (Deltasone) 20 mg tablet    benzonatate (Tessalon) 200 mg capsule        Call 2 wks if not resolving.

## 2025-03-28 ENCOUNTER — APPOINTMENT (OUTPATIENT)
Dept: ENDOCRINOLOGY | Facility: CLINIC | Age: 60
End: 2025-03-28
Payer: COMMERCIAL

## 2025-04-18 ENCOUNTER — APPOINTMENT (OUTPATIENT)
Dept: PAIN MEDICINE | Facility: CLINIC | Age: 60
End: 2025-04-18
Payer: COMMERCIAL

## 2025-04-21 ASSESSMENT — PROMIS GLOBAL HEALTH SCALE
RATE_QUALITY_OF_LIFE: GOOD
RATE_GENERAL_HEALTH: GOOD
RATE_SOCIAL_SATISFACTION: VERY GOOD
EMOTIONAL_PROBLEMS: SOMETIMES
RATE_AVERAGE_PAIN: 2
RATE_PHYSICAL_HEALTH: GOOD
RATE_MENTAL_HEALTH: GOOD
CARRYOUT_SOCIAL_ACTIVITIES: GOOD
CARRYOUT_PHYSICAL_ACTIVITIES: COMPLETELY
RATE_AVERAGE_FATIGUE: MILD

## 2025-04-28 ENCOUNTER — OFFICE VISIT (OUTPATIENT)
Dept: PRIMARY CARE | Facility: CLINIC | Age: 60
End: 2025-04-28
Payer: COMMERCIAL

## 2025-04-28 ENCOUNTER — CLINICAL SUPPORT (OUTPATIENT)
Dept: PRIMARY CARE | Facility: CLINIC | Age: 60
End: 2025-04-28
Payer: COMMERCIAL

## 2025-04-28 ENCOUNTER — APPOINTMENT (OUTPATIENT)
Dept: GASTROENTEROLOGY | Facility: CLINIC | Age: 60
End: 2025-04-28
Payer: COMMERCIAL

## 2025-04-28 VITALS
BODY MASS INDEX: 37.93 KG/M2 | DIASTOLIC BLOOD PRESSURE: 72 MMHG | SYSTOLIC BLOOD PRESSURE: 150 MMHG | HEART RATE: 108 BPM | WEIGHT: 280 LBS | TEMPERATURE: 97.8 F | HEIGHT: 72 IN | OXYGEN SATURATION: 97 % | RESPIRATION RATE: 16 BRPM

## 2025-04-28 VITALS — HEART RATE: 90 BPM | BODY MASS INDEX: 37.93 KG/M2 | OXYGEN SATURATION: 97 % | WEIGHT: 280 LBS | HEIGHT: 72 IN

## 2025-04-28 DIAGNOSIS — E11.9 TYPE 2 DIABETES MELLITUS WITHOUT COMPLICATION, WITHOUT LONG-TERM CURRENT USE OF INSULIN: ICD-10-CM

## 2025-04-28 DIAGNOSIS — D49.0 IPMN (INTRADUCTAL PAPILLARY MUCINOUS NEOPLASM): Primary | ICD-10-CM

## 2025-04-28 DIAGNOSIS — K76.0 FATTY LIVER: ICD-10-CM

## 2025-04-28 DIAGNOSIS — R03.0 ELEVATED BLOOD PRESSURE READING: Primary | ICD-10-CM

## 2025-04-28 DIAGNOSIS — Z12.5 SCREENING FOR MALIGNANT NEOPLASM OF PROSTATE: ICD-10-CM

## 2025-04-28 DIAGNOSIS — E66.01 CLASS 2 SEVERE OBESITY DUE TO EXCESS CALORIES WITH SERIOUS COMORBIDITY AND BODY MASS INDEX (BMI) OF 37.0 TO 37.9 IN ADULT: ICD-10-CM

## 2025-04-28 DIAGNOSIS — E66.812 CLASS 2 SEVERE OBESITY DUE TO EXCESS CALORIES WITH SERIOUS COMORBIDITY AND BODY MASS INDEX (BMI) OF 37.0 TO 37.9 IN ADULT: ICD-10-CM

## 2025-04-28 LAB — POC HEMOGLOBIN A1C: 7 % (ref 4.2–6.5)

## 2025-04-28 PROCEDURE — 3051F HG A1C>EQUAL 7.0%<8.0%: CPT | Performed by: FAMILY MEDICINE

## 2025-04-28 PROCEDURE — 3008F BODY MASS INDEX DOCD: CPT | Performed by: FAMILY MEDICINE

## 2025-04-28 PROCEDURE — 99214 OFFICE O/P EST MOD 30 MIN: CPT | Performed by: FAMILY MEDICINE

## 2025-04-28 PROCEDURE — 1036F TOBACCO NON-USER: CPT | Performed by: INTERNAL MEDICINE

## 2025-04-28 PROCEDURE — 3008F BODY MASS INDEX DOCD: CPT | Performed by: INTERNAL MEDICINE

## 2025-04-28 PROCEDURE — 99213 OFFICE O/P EST LOW 20 MIN: CPT | Performed by: INTERNAL MEDICINE

## 2025-04-28 PROCEDURE — 3078F DIAST BP <80 MM HG: CPT | Performed by: FAMILY MEDICINE

## 2025-04-28 PROCEDURE — 83036 HEMOGLOBIN GLYCOSYLATED A1C: CPT | Performed by: FAMILY MEDICINE

## 2025-04-28 PROCEDURE — 3077F SYST BP >= 140 MM HG: CPT | Performed by: FAMILY MEDICINE

## 2025-04-28 PROCEDURE — 3051F HG A1C>EQUAL 7.0%<8.0%: CPT | Performed by: INTERNAL MEDICINE

## 2025-04-28 RX ORDER — PREDNISONE 50 MG/1
TABLET ORAL
Qty: 3 TABLET | Refills: 0 | Status: SHIPPED | OUTPATIENT
Start: 2025-04-28

## 2025-04-28 ASSESSMENT — PAIN SCALES - GENERAL: PAINLEVEL_OUTOF10: 0-NO PAIN

## 2025-04-28 ASSESSMENT — ENCOUNTER SYMPTOMS
OCCASIONAL FEELINGS OF UNSTEADINESS: 0
LOSS OF SENSATION IN FEET: 0
DEPRESSION: 0

## 2025-04-28 NOTE — PROGRESS NOTES
REASON FOR VISIT: To discuss MRI follow-up for pancreas    HPI:  Shaheen Ambriz is a 59 y.o. male with history of obesity, fatty liver disease and on imaging noted to have evidence of sidebranch IPMN here for follow-up.  Last seen about 8 months ago.  MRI from last summer showed evidence of sidebranch IPMN.  Continues to have occasional discomfort in the right lateral abdomen.  Feels that it can be affected by position and can also be back discomfort.  States that he needs to be little more aggressive with exercise as he has not been exercising for his fatty liver disease.          PRIOR ENDOSCOPY    PAST MEDICAL HISTORY  Medical History[1]    PAST SURGICAL HISTORY  Surgical History[2]    FAMILY HISTORY  Family History[3]    SOCIAL HISTORY  Social History     Tobacco Use    Smoking status: Never    Smokeless tobacco: Never   Substance Use Topics    Alcohol use: Yes     Comment: pt drinks an ocassional glass of wine       REVIEW OF SYSTEMS  CONSTITUTIONAL: negative for fever, chills, fatigue, or unintentional weight loss,   HEENT: negative for icteric sclera, eye pain/redness, or changes in vision/hearing  RESPIRATORY: negative for cough, hemoptysis, wheezing, orthopnea, or dyspnea on exertion  CARDIOVASCULAR: negative for chest pain, palpitations, or syncope   GASTROINTESTINAL: as noted per HPI  GENITOURINARY: negative for dysuria, polyuria, incontinence, or hematuria  MUSCULOSKELETAL: negative for arthralgia, myalgia, or joint swelling/stiffness   INTEGUMENTARY/SKIN: negative jaundice, rash, or skin lesion  HEMATOLOGIC/LYMPHATIC: negative for prolonged bleeding, easy bruising, or swollen lymph nodes  ENDOCRINE: negative for cold/heat intolerance, polydipsia, polyuria, or goiter  NEUROLOGIC: negative for headaches, dizziness, tremor, or gait abnormality  PSYCHIATRIC: negative for anxiety, depression, personality changes, or sleep disturbances      A 10 point review of systems was completed and was otherwise  negative.    ALLERGIES  Allergies[4]    MEDICATIONS  Current Medications[5]    VITALS  Pulse 90   Ht 1.829 m (6')   Wt 127 kg (280 lb)   SpO2 97%   BMI 37.97 kg/m²      PHYSICAL EXAM  Alert and oriented in no acute distress    ASSESSMENT/ PLAN  Patient with evidence of sidebranch IPMN on MRI last year.  Will pursue follow-up surveillance MRI to confirm stability and no progression of cystic changes in the pancreas.  He does have IV contrast allergy and will obtain radiology protocol for IV contrast to be ordered prior to his MRI.  He is in agreement with the plan I will follow-up with him on imaging.        Signature: Josue Cragi MD    Date: 4/28/2025  Time: 2:02 PM         [1]   Past Medical History:  Diagnosis Date    Anxiety     Asthma     triggers: dust, weather changes-last use 9/2023    Diabetes mellitus (Multi)     A1C=7.1 10/12/23    Diverticulosis     multiple diverticulitis flares in past year    Factor V Leiden (Multi)     daily ASA, no hematologist, has been tx w/ Lovenox in past post op    GERD (gastroesophageal reflux disease)     Hyperlipidemia     Hypertension     Nephrolithiasis     last 2007    Sinusitis     r/t allergies    Syncope 09/2022    single episode, evaluated by Dr. Davey Garcia.  ECHO 9/12/22 WNL, Zio monitor WNL per patient.  No recurrance of symptoms   [2]   Past Surgical History:  Procedure Laterality Date    KNEE SURGERY Right 06/2023    LITHOTRIPSY  2007    SHOULDER Left 06/2019    clavical decompression    SIGMOIDECTOMY  02/07/2024    UMBILICAL HERNIA REPAIR  12/2021    VASECTOMY  2006   [3]   Family History  Problem Relation Name Age of Onset    Pancreatic cancer Mother      No Known Problems Father      No Known Problems Brother      Hypertension Other grandparent    [4]   Allergies  Allergen Reactions    Dulaglutide Anxiety, GI Upset and Unknown     Critically High    Iodinated Contrast Media Anaphylaxis, Hives and Itching    Morphine Other, GI Upset, Nausea And Vomiting  and Nausea/vomiting     Vomiting, GI Intolerance, GI Upset    VOMITING    Theophylline GI Upset, Palpitations and Nausea And Vomiting    Metformin Hcl GI Upset   [5]   Current Outpatient Medications   Medication Sig Dispense Refill    albuterol sulfate (Proair Digihaler) 90 mcg/actuation aero powdr breath act w/sensor inhaler Inhale 2 puffs every 4 hours.      aspirin 81 mg EC tablet Take 1 tablet (81 mg) by mouth once daily.      atorvastatin (Lipitor) 20 mg tablet Take 1 tablet (20 mg) by mouth once daily. 90 tablet 3    benzonatate (Tessalon) 200 mg capsule Take 1 capsule (200 mg) by mouth 3 times a day as needed for cough. Do not crush or chew. 42 capsule 3    busPIRone (Buspar) 5 mg tablet Take 1 tablet (5 mg) by mouth every 12 hours.      co-enzyme Q-10 30 mg capsule Take 1 capsule (30 mg) by mouth once daily.      FLUoxetine (PROzac) 40 mg capsule Take 1 capsule (40 mg) by mouth once daily. 90 capsule 1    fluticasone propion-salmeteroL (Advair Diskus) 250-50 mcg/dose diskus inhaler INHALE 1 PUFF BY MOUTH TWICE A  each 2    loratadine (Claritin) 10 mg tablet Take 1 tablet (10 mg) by mouth once daily.      LORazepam (Ativan) 0.5 mg tablet TAKE 1 TABLET BY MOUTH ONCE A DAY AS NEEDED SEVERE ANXIETY      montelukast (Singulair) 10 mg tablet TAKE 1 TABLET BY MOUTH EVERY DAY IN THE EVENING 90 tablet 1    omeprazole OTC (PriLOSEC OTC) 20 mg EC tablet Take 1 capsule by mouth once daily.      traZODone (Desyrel) 50 mg tablet TAKE 1 TABLET (50 MG) BY MOUTH ONCE DAILY AT BEDTIME. 90 tablet 1     No current facility-administered medications for this visit.

## 2025-04-28 NOTE — PROGRESS NOTES
Patient received the following medication education on Januvia:    - Counseled patient on Januvia MOA, expectations, side effects, duration of therapy, administration, and monitoring parameters.  - Reviewed common side effects such as headache, common cold symptoms and joint pain  - Answered all patient questions and concerns.    Medication education provided by SANTO Lenz, PharmD, BCPS

## 2025-04-28 NOTE — PROGRESS NOTES
Subjective   Patient ID: Shaheen Ambriz is a 59 y.o. male who presents for Annual Exam (Pt is here for pe and is not fasting.).    HPI he has gained some wt and is considering going back to the gym.  Diet has been poor and under a lot of stress at work.   Last colonoscopy was 1/2023  Calcium score of 57 last year.    Review of Systems   Constitutional: Negative.    HENT: Negative.     Respiratory: Negative.     Cardiovascular: Negative.    Gastrointestinal: Negative.    Genitourinary: Negative.    Musculoskeletal: Negative.    Neurological: Negative.        Objective   /72   Pulse 108   Temp 36.6 °C (97.8 °F) (Temporal)   Resp 16   Ht 1.829 m (6')   Wt 127 kg (280 lb)   SpO2 97%   BMI 37.97 kg/m²     Physical Exam  Vitals and nursing note reviewed.   Constitutional:       General: He is not in acute distress.  HENT:      Right Ear: Tympanic membrane and ear canal normal.      Left Ear: Tympanic membrane and ear canal normal.      Nose: Nose normal. No rhinorrhea.      Mouth/Throat:      Pharynx: Oropharynx is clear. No oropharyngeal exudate or posterior oropharyngeal erythema.      Comments: Dentition wnl  Eyes:      Extraocular Movements: Extraocular movements intact.      Conjunctiva/sclera: Conjunctivae normal.      Pupils: Pupils are equal, round, and reactive to light.   Neck:      Vascular: No carotid bruit.   Cardiovascular:      Rate and Rhythm: Normal rate and regular rhythm.      Heart sounds: Normal heart sounds. No murmur heard.  Pulmonary:      Breath sounds: Normal breath sounds. No wheezing or rhonchi.   Abdominal:      General: Bowel sounds are normal. There is no distension.      Palpations: Abdomen is soft. There is no mass.      Tenderness: There is no abdominal tenderness. There is no guarding or rebound.      Hernia: No hernia is present.   Musculoskeletal:         General: No swelling or tenderness. Normal range of motion.      Cervical back: Normal range of motion and neck  supple.   Lymphadenopathy:      Cervical: No cervical adenopathy.   Skin:     General: Skin is warm.      Findings: No rash.   Neurological:      General: No focal deficit present.      Mental Status: He is alert.         Assessment/Plan   Problem List Items Addressed This Visit           ICD-10-CM    Type 2 diabetes mellitus without complication, without long-term current use of insulin E11.9    Relevant Medications    SITagliptin phosphate (Januvia) 25 mg tablet    Other Relevant Orders    POCT glycosylated hemoglobin (Hb A1C) manually resulted (Completed)    Referral to Clinical Pharmacy    Obesity E66.9     Other Visit Diagnoses         Codes      Elevated blood pressure reading    -  Primary R03.0             Call with bp average 1 month and will add meds if elevated.     Will start Januvia and recheck near future.  Fasting labs near future

## 2025-04-29 ASSESSMENT — ENCOUNTER SYMPTOMS
RESPIRATORY NEGATIVE: 1
NEUROLOGICAL NEGATIVE: 1
GASTROINTESTINAL NEGATIVE: 1
MUSCULOSKELETAL NEGATIVE: 1
CONSTITUTIONAL NEGATIVE: 1
CARDIOVASCULAR NEGATIVE: 1

## 2025-05-23 ENCOUNTER — HOSPITAL ENCOUNTER (OUTPATIENT)
Dept: RADIOLOGY | Facility: CLINIC | Age: 60
Discharge: HOME | End: 2025-05-23
Payer: COMMERCIAL

## 2025-05-23 DIAGNOSIS — D49.0 IPMN (INTRADUCTAL PAPILLARY MUCINOUS NEOPLASM): ICD-10-CM

## 2025-05-23 PROCEDURE — 2550000001 HC RX 255 CONTRASTS: Mod: JZ | Performed by: INTERNAL MEDICINE

## 2025-05-23 PROCEDURE — 74183 MRI ABD W/O CNTR FLWD CNTR: CPT

## 2025-05-23 PROCEDURE — A9575 INJ GADOTERATE MEGLUMI 0.1ML: HCPCS | Mod: JZ | Performed by: INTERNAL MEDICINE

## 2025-05-23 RX ORDER — GADOTERATE MEGLUMINE 376.9 MG/ML
20 INJECTION INTRAVENOUS
Status: COMPLETED | OUTPATIENT
Start: 2025-05-23 | End: 2025-05-23

## 2025-05-23 RX ADMIN — GADOTERATE MEGLUMINE 20 ML: 376.9 INJECTION INTRAVENOUS at 16:04

## 2025-06-20 DIAGNOSIS — J45.909 UNSPECIFIED ASTHMA, UNCOMPLICATED (HHS-HCC): ICD-10-CM

## 2025-06-20 RX ORDER — FLUTICASONE PROPIONATE AND SALMETEROL 250; 50 UG/1; UG/1
1 POWDER RESPIRATORY (INHALATION) 2 TIMES DAILY
Qty: 180 EACH | Refills: 2 | Status: SHIPPED | OUTPATIENT
Start: 2025-06-20

## 2025-06-20 RX ORDER — FLUTICASONE PROPIONATE AND SALMETEROL 250; 50 UG/1; UG/1
1 POWDER RESPIRATORY (INHALATION) 2 TIMES DAILY
Qty: 180 EACH | Refills: 2 | OUTPATIENT
Start: 2025-06-20

## 2025-07-13 ASSESSMENT — ENCOUNTER SYMPTOMS
FATIGUE: 0
RHINORRHEA: 0
VOMITING: 0
COUGH: 0
EYE PAIN: 0
SHORTNESS OF BREATH: 0
ANOREXIA: 0
DIARRHEA: 0
SORE THROAT: 0
NAIL CHANGES: 0
FEVER: 0

## 2025-07-17 ENCOUNTER — APPOINTMENT (OUTPATIENT)
Dept: PRIMARY CARE | Facility: CLINIC | Age: 60
End: 2025-07-17
Payer: COMMERCIAL

## 2025-07-17 VITALS
WEIGHT: 275 LBS | TEMPERATURE: 98.5 F | OXYGEN SATURATION: 99 % | RESPIRATION RATE: 20 BRPM | BODY MASS INDEX: 37.25 KG/M2 | HEART RATE: 78 BPM | HEIGHT: 72 IN | SYSTOLIC BLOOD PRESSURE: 120 MMHG | DIASTOLIC BLOOD PRESSURE: 68 MMHG

## 2025-07-17 DIAGNOSIS — L30.4 INTERTRIGO: Primary | ICD-10-CM

## 2025-07-17 PROCEDURE — 3074F SYST BP LT 130 MM HG: CPT | Performed by: FAMILY MEDICINE

## 2025-07-17 PROCEDURE — 3008F BODY MASS INDEX DOCD: CPT | Performed by: FAMILY MEDICINE

## 2025-07-17 PROCEDURE — 99213 OFFICE O/P EST LOW 20 MIN: CPT | Performed by: FAMILY MEDICINE

## 2025-07-17 PROCEDURE — 1036F TOBACCO NON-USER: CPT | Performed by: FAMILY MEDICINE

## 2025-07-17 PROCEDURE — 3078F DIAST BP <80 MM HG: CPT | Performed by: FAMILY MEDICINE

## 2025-07-17 PROCEDURE — 3051F HG A1C>EQUAL 7.0%<8.0%: CPT | Performed by: FAMILY MEDICINE

## 2025-07-17 RX ORDER — KETOCONAZOLE 20 MG/G
CREAM TOPICAL 2 TIMES DAILY
Qty: 60 G | Refills: 1 | Status: SHIPPED | OUTPATIENT
Start: 2025-07-17 | End: 2025-07-31

## 2025-07-17 ASSESSMENT — ENCOUNTER SYMPTOMS
DIARRHEA: 0
FATIGUE: 0
FEVER: 0
VOMITING: 0
ANOREXIA: 0
COUGH: 0
SORE THROAT: 0
SHORTNESS OF BREATH: 0
LOSS OF SENSATION IN FEET: 0
EYE PAIN: 0
RHINORRHEA: 0
OCCASIONAL FEELINGS OF UNSTEADINESS: 0
DEPRESSION: 0
NAIL CHANGES: 0

## 2025-07-17 ASSESSMENT — PATIENT HEALTH QUESTIONNAIRE - PHQ9
2. FEELING DOWN, DEPRESSED OR HOPELESS: NOT AT ALL
SUM OF ALL RESPONSES TO PHQ9 QUESTIONS 1 AND 2: 0
1. LITTLE INTEREST OR PLEASURE IN DOING THINGS: NOT AT ALL

## 2025-07-17 ASSESSMENT — PAIN SCALES - GENERAL: PAINLEVEL_OUTOF10: 0-NO PAIN

## 2025-07-17 NOTE — PROGRESS NOTES
Subjective   Patient ID: Shaheen Ambriz is a 60 y.o. male who presents for Rash (PATIENT COMPLAINS OF  A RASH UNDER BOTH ARM PIT X 1 MONTH THAT ITCHES).    Rash  This is a chronic problem. The current episode started more than 1 month ago. The problem has been waxing and waning since onset. The affected locations include the groin, left axilla and right axilla. The rash is characterized by burning and itchiness. He was exposed to nothing. Pertinent negatives include no anorexia, congestion, cough, diarrhea, eye pain, facial edema, fatigue, fever, joint pain, nail changes, rhinorrhea, shortness of breath, sore throat or vomiting.      He has had intermittent pruritic rash of bilateral axilla with some redness.   Review of Systems   Constitutional:  Negative for fatigue and fever.   HENT:  Negative for congestion, rhinorrhea and sore throat.    Eyes:  Negative for pain.   Respiratory:  Negative for cough and shortness of breath.    Gastrointestinal:  Negative for anorexia, diarrhea and vomiting.   Musculoskeletal:  Negative for joint pain.   Skin:  Positive for rash. Negative for nail changes.       Objective   /68 (BP Location: Right arm, Patient Position: Sitting, BP Cuff Size: Adult)   Pulse 78   Temp 36.9 °C (98.5 °F) (Skin)   Resp 20   Ht 1.829 m (6')   Wt 125 kg (275 lb)   SpO2 99%   BMI 37.30 kg/m²     Physical Exam  Constitutional:       General: He is not in acute distress.     Appearance: Normal appearance.     Cardiovascular:      Rate and Rhythm: Normal rate and regular rhythm.      Heart sounds: No murmur heard.  Pulmonary:      Breath sounds: Normal breath sounds. No wheezing.     Skin:     Comments: Bilateral axillary area with mild erythema with well demarcated border.       Neurological:      Mental Status: He is alert.         Assessment/Plan   Problem List Items Addressed This Visit    None  Visit Diagnoses         Codes      Intertrigo    -  Primary L30.4    Relevant Medications     ketoconazole (NIZOral) 2 % cream

## 2025-07-25 DIAGNOSIS — K57.32 DIVERTICULITIS OF LARGE INTESTINE WITHOUT PERFORATION OR ABSCESS WITHOUT BLEEDING: Primary | ICD-10-CM

## 2025-07-25 DIAGNOSIS — K57.92 DIVERTICULITIS: Primary | ICD-10-CM

## 2025-07-25 RX ORDER — ONDANSETRON 4 MG/1
4 TABLET, ORALLY DISINTEGRATING ORAL EVERY 8 HOURS PRN
Qty: 20 TABLET | Refills: 0 | Status: SHIPPED | OUTPATIENT
Start: 2025-07-25 | End: 2025-07-26 | Stop reason: WASHOUT

## 2025-07-25 RX ORDER — AMOXICILLIN AND CLAVULANATE POTASSIUM 500; 125 MG/1; MG/1
500 TABLET, FILM COATED ORAL 2 TIMES DAILY
Qty: 14 TABLET | Refills: 0 | Status: SHIPPED | OUTPATIENT
Start: 2025-07-25 | End: 2025-08-01

## 2025-07-25 RX ORDER — METRONIDAZOLE 500 MG/1
500 TABLET ORAL 3 TIMES DAILY
Qty: 21 TABLET | Refills: 0 | Status: SHIPPED | OUTPATIENT
Start: 2025-07-25 | End: 2025-08-01

## 2025-07-26 ENCOUNTER — HOSPITAL ENCOUNTER (EMERGENCY)
Facility: HOSPITAL | Age: 60
Discharge: HOME | End: 2025-07-26
Attending: EMERGENCY MEDICINE
Payer: COMMERCIAL

## 2025-07-26 ENCOUNTER — APPOINTMENT (OUTPATIENT)
Dept: RADIOLOGY | Facility: HOSPITAL | Age: 60
End: 2025-07-26
Payer: COMMERCIAL

## 2025-07-26 VITALS
WEIGHT: 268.1 LBS | HEIGHT: 72 IN | TEMPERATURE: 97.5 F | OXYGEN SATURATION: 97 % | RESPIRATION RATE: 18 BRPM | DIASTOLIC BLOOD PRESSURE: 74 MMHG | HEART RATE: 79 BPM | SYSTOLIC BLOOD PRESSURE: 131 MMHG | BODY MASS INDEX: 36.31 KG/M2

## 2025-07-26 DIAGNOSIS — F32.A DEPRESSION, UNSPECIFIED DEPRESSION TYPE: ICD-10-CM

## 2025-07-26 DIAGNOSIS — R10.32 LEFT LOWER QUADRANT ABDOMINAL PAIN: Primary | ICD-10-CM

## 2025-07-26 DIAGNOSIS — E78.2 MIXED HYPERLIPIDEMIA: ICD-10-CM

## 2025-07-26 LAB
ALBUMIN SERPL BCP-MCNC: 4.4 G/DL (ref 3.4–5)
ALP SERPL-CCNC: 98 U/L (ref 33–120)
ALT SERPL W P-5'-P-CCNC: 29 U/L (ref 10–52)
ANION GAP SERPL CALCULATED.3IONS-SCNC: 13 MMOL/L (ref 10–20)
APPEARANCE UR: CLEAR
AST SERPL W P-5'-P-CCNC: 22 U/L (ref 9–39)
BASOPHILS # BLD AUTO: 0.08 X10*3/UL (ref 0–0.1)
BASOPHILS NFR BLD AUTO: 0.6 %
BILIRUB SERPL-MCNC: 0.5 MG/DL (ref 0–1.2)
BILIRUB UR STRIP.AUTO-MCNC: NEGATIVE MG/DL
BUN SERPL-MCNC: 19 MG/DL (ref 6–23)
CALCIUM SERPL-MCNC: 10 MG/DL (ref 8.6–10.3)
CHLORIDE SERPL-SCNC: 100 MMOL/L (ref 98–107)
CO2 SERPL-SCNC: 29 MMOL/L (ref 21–32)
COLOR UR: ABNORMAL
CREAT SERPL-MCNC: 1.18 MG/DL (ref 0.5–1.3)
EGFRCR SERPLBLD CKD-EPI 2021: 72 ML/MIN/1.73M*2
EOSINOPHIL # BLD AUTO: 0.22 X10*3/UL (ref 0–0.7)
EOSINOPHIL NFR BLD AUTO: 1.7 %
ERYTHROCYTE [DISTWIDTH] IN BLOOD BY AUTOMATED COUNT: 15 % (ref 11.5–14.5)
GLUCOSE SERPL-MCNC: 101 MG/DL (ref 74–99)
GLUCOSE UR STRIP.AUTO-MCNC: NORMAL MG/DL
HCT VFR BLD AUTO: 45 % (ref 41–52)
HGB BLD-MCNC: 13.9 G/DL (ref 13.5–17.5)
IMM GRANULOCYTES # BLD AUTO: 0.08 X10*3/UL (ref 0–0.7)
IMM GRANULOCYTES NFR BLD AUTO: 0.6 % (ref 0–0.9)
KETONES UR STRIP.AUTO-MCNC: ABNORMAL MG/DL
LEUKOCYTE ESTERASE UR QL STRIP.AUTO: NEGATIVE
LYMPHOCYTES # BLD AUTO: 2.48 X10*3/UL (ref 1.2–4.8)
LYMPHOCYTES NFR BLD AUTO: 18.9 %
MCH RBC QN AUTO: 25.7 PG (ref 26–34)
MCHC RBC AUTO-ENTMCNC: 30.9 G/DL (ref 32–36)
MCV RBC AUTO: 83 FL (ref 80–100)
MONOCYTES # BLD AUTO: 0.8 X10*3/UL (ref 0.1–1)
MONOCYTES NFR BLD AUTO: 6.1 %
NEUTROPHILS # BLD AUTO: 9.43 X10*3/UL (ref 1.2–7.7)
NEUTROPHILS NFR BLD AUTO: 72.1 %
NITRITE UR QL STRIP.AUTO: NEGATIVE
NRBC BLD-RTO: 0 /100 WBCS (ref 0–0)
PH UR STRIP.AUTO: 6 [PH]
PLATELET # BLD AUTO: 460 X10*3/UL (ref 150–450)
POTASSIUM SERPL-SCNC: 3.8 MMOL/L (ref 3.5–5.3)
PROT SERPL-MCNC: 7.8 G/DL (ref 6.4–8.2)
PROT UR STRIP.AUTO-MCNC: NEGATIVE MG/DL
RBC # BLD AUTO: 5.4 X10*6/UL (ref 4.5–5.9)
RBC # UR STRIP.AUTO: NEGATIVE MG/DL
SODIUM SERPL-SCNC: 138 MMOL/L (ref 136–145)
SP GR UR STRIP.AUTO: 1.02
UROBILINOGEN UR STRIP.AUTO-MCNC: NORMAL MG/DL
WBC # BLD AUTO: 13.1 X10*3/UL (ref 4.4–11.3)

## 2025-07-26 PROCEDURE — 96365 THER/PROPH/DIAG IV INF INIT: CPT

## 2025-07-26 PROCEDURE — 74176 CT ABD & PELVIS W/O CONTRAST: CPT | Mod: FOREIGN READ | Performed by: RADIOLOGY

## 2025-07-26 PROCEDURE — 99284 EMERGENCY DEPT VISIT MOD MDM: CPT | Mod: 25 | Performed by: EMERGENCY MEDICINE

## 2025-07-26 PROCEDURE — 81003 URINALYSIS AUTO W/O SCOPE: CPT | Performed by: EMERGENCY MEDICINE

## 2025-07-26 PROCEDURE — 2500000004 HC RX 250 GENERAL PHARMACY W/ HCPCS (ALT 636 FOR OP/ED): Performed by: EMERGENCY MEDICINE

## 2025-07-26 PROCEDURE — 85025 COMPLETE CBC W/AUTO DIFF WBC: CPT | Performed by: EMERGENCY MEDICINE

## 2025-07-26 PROCEDURE — 96375 TX/PRO/DX INJ NEW DRUG ADDON: CPT

## 2025-07-26 PROCEDURE — 84075 ASSAY ALKALINE PHOSPHATASE: CPT | Performed by: EMERGENCY MEDICINE

## 2025-07-26 PROCEDURE — 36415 COLL VENOUS BLD VENIPUNCTURE: CPT | Performed by: EMERGENCY MEDICINE

## 2025-07-26 PROCEDURE — 74176 CT ABD & PELVIS W/O CONTRAST: CPT

## 2025-07-26 RX ORDER — ONDANSETRON HYDROCHLORIDE 2 MG/ML
4 INJECTION, SOLUTION INTRAVENOUS ONCE
Status: COMPLETED | OUTPATIENT
Start: 2025-07-26 | End: 2025-07-26

## 2025-07-26 RX ORDER — ONDANSETRON 4 MG/1
4 TABLET, ORALLY DISINTEGRATING ORAL EVERY 8 HOURS PRN
Qty: 12 TABLET | Refills: 0 | Status: SHIPPED | OUTPATIENT
Start: 2025-07-26 | End: 2025-08-02

## 2025-07-26 RX ORDER — OXYCODONE AND ACETAMINOPHEN 5; 325 MG/1; MG/1
1 TABLET ORAL EVERY 6 HOURS PRN
Qty: 5 TABLET | Refills: 0 | Status: SHIPPED | OUTPATIENT
Start: 2025-07-26 | End: 2025-07-29

## 2025-07-26 RX ORDER — METRONIDAZOLE 500 MG/100ML
500 INJECTION, SOLUTION INTRAVENOUS ONCE
Status: COMPLETED | OUTPATIENT
Start: 2025-07-26 | End: 2025-07-26

## 2025-07-26 RX ORDER — FENTANYL CITRATE 50 UG/ML
50 INJECTION, SOLUTION INTRAMUSCULAR; INTRAVENOUS ONCE
Status: COMPLETED | OUTPATIENT
Start: 2025-07-26 | End: 2025-07-26

## 2025-07-26 RX ADMIN — METRONIDAZOLE 500 MG: 500 INJECTION, SOLUTION INTRAVENOUS at 19:17

## 2025-07-26 RX ADMIN — FENTANYL CITRATE 50 MCG: 50 INJECTION INTRAMUSCULAR; INTRAVENOUS at 19:20

## 2025-07-26 RX ADMIN — SODIUM CHLORIDE 1000 ML: 900 INJECTION, SOLUTION INTRAVENOUS at 19:18

## 2025-07-26 RX ADMIN — ONDANSETRON 4 MG: 2 INJECTION, SOLUTION INTRAMUSCULAR; INTRAVENOUS at 19:20

## 2025-07-26 ASSESSMENT — PAIN DESCRIPTION - ORIENTATION: ORIENTATION: LEFT;RIGHT;LOWER

## 2025-07-26 ASSESSMENT — PAIN DESCRIPTION - FREQUENCY: FREQUENCY: CONSTANT/CONTINUOUS

## 2025-07-26 ASSESSMENT — PAIN - FUNCTIONAL ASSESSMENT
PAIN_FUNCTIONAL_ASSESSMENT: 0-10

## 2025-07-26 ASSESSMENT — PAIN DESCRIPTION - DESCRIPTORS
DESCRIPTORS: CRAMPING
DESCRIPTORS: CRAMPING

## 2025-07-26 ASSESSMENT — PAIN SCALES - GENERAL
PAINLEVEL_OUTOF10: 5 - MODERATE PAIN
PAINLEVEL_OUTOF10: 5 - MODERATE PAIN
PAINLEVEL_OUTOF10: 2

## 2025-07-26 ASSESSMENT — PAIN DESCRIPTION - LOCATION
LOCATION: ABDOMEN
LOCATION: ABDOMEN

## 2025-07-26 NOTE — ED PROVIDER NOTES
HPI   Chief Complaint   Patient presents with    Abdominal Pain     Started Wednesday night. Nausea, abd cramping in lower abdomen, constipation. Hx of diverticulitis. States feels the exact same as usual diverticulitis flare up        HPI  60 male with history diverticulitis presents with left lower quadrant pain similar to his diverticulitis.  Symptoms started about 3 days ago he called his doctor who called in Flagyl for him he started taking it but still having a lot of cramping and pain in his left lower abdomen.  He has not take any medicine for his pain.  Some nausea no vomiting.  No fevers or chills.  Has been constipated.  His last bout of diverticulitis was about a year ago.  No blood in his stool.  No difficulty urinating.  No other complaints.      Patient History   Medical History[1]  Surgical History[2]  Family History[3]  Social History[4]    Physical Exam   ED Triage Vitals [07/26/25 1749]   Temperature Heart Rate Respirations BP   36.4 °C (97.5 °F) 99 18 (!) 135/96      Pulse Ox Temp Source Heart Rate Source Patient Position   96 % Temporal Monitor Sitting      BP Location FiO2 (%)     Right arm --       Physical Exam  General:  Awake, alert, no acute distress.  Head: Normocephalic, Atraumatic  Neck: Supple, trachea midline, no stridor  Skin: Warm and dry, no rashes   Lungs: Clear to auscultation bilaterally no acute respiratory distress, speaking in full sentences without difficulty  CV: Regular Rate Rhythm with no obvious murmurs gallops rubs noted, no jugular venous distention, no pedal edema   Abdomen: Soft, left lower quadrant tenderness palpation, nondistended, positive bowel sounds, no peritoneal signs  Neuro:  No gross focal neurologic deficits, NIH is 0  Musculoskeletal:  Full range of motion in all 4 extremities  Psychiatric:  Alert oriented x 3, Good insight into condition.    ED Course & MDM   Diagnoses as of 07/27/25 0422   Left lower quadrant abdominal pain                 No data  recorded     Logan Coma Scale Score: 15 (07/26/25 1750 : Baldomero Pedraza, EMT)                           Medical Decision Making  Patient's workup in the ED reveals a white blood cell, 13,000.  CT abdomen pelvis actually does not show diverticulitis but clinically I feel the patient's symptoms are consistent with diverticulitis.  Treated IV fluids, Flagyl, fentanyl, Zofran.  His pain was controlled.  He was advised continue with Flagyl at home.  Prescribed Percocet and Zofran for home.  Follow-up with his doctor.  Stable for discharge.    Procedure  Procedures         [1]   Past Medical History:  Diagnosis Date    Allergic     Anxiety     Asthma     triggers: dust, weather changes-last use 9/2023    Depression     Diabetes mellitus (Multi)     A1C=7.1 10/12/23    Diverticulosis     multiple diverticulitis flares in past year    Factor V Leiden (Multi)     daily ASA, no hematologist, has been tx w/ Lovenox in past post op    GERD (gastroesophageal reflux disease)     Hyperlipidemia     Hypertension borderline    Nephrolithiasis     last 2007    Sinusitis     r/t allergies    Syncope 09/2022    single episode, evaluated by Dr. Davey Garcia.  ECHO 9/12/22 WNL, Zio monitor WNL per patient.  No recurrance of symptoms   [2]   Past Surgical History:  Procedure Laterality Date    APPENDECTOMY      COLON SURGERY      KNEE SURGERY Right 06/2023    LITHOTRIPSY  2007    SHOULDER Left 06/2019    clavical decompression    SIGMOIDECTOMY  02/07/2024    UMBILICAL HERNIA REPAIR  12/2021    VASECTOMY  2006    WISDOM TOOTH EXTRACTION     [3]   Family History  Problem Relation Name Age of Onset    Pancreatic cancer Mother      No Known Problems Father      No Known Problems Brother      Hypertension Other grandparent     Clotting disorder Mother Mom     Cancer Mother Mom     Hyperlipidemia Mother Mom     Blood clot Mother Mom     Blood Disorder Mother Mom     Alcohol abuse Paternal Grandfather Grandfather     Alcohol abuse  Paternal Grandfather Grandfather     Alcohol abuse Paternal Grandfather Grandfather     Alcohol abuse Paternal Grandfather Grandfather     Hypertension Maternal Grandmother Grandmother     Mental illness Father Dad     Hernia Father Dad     No Known Problems Father Dad     Alcohol abuse Paternal Grandfather Grandfather     Diabetes Maternal Grandmother Grandmother     Asthma Maternal Grandmother Grandmother     Hernia Father Dad     Diabetes Paternal Grandmother paternal grandmother     Hypertension Paternal Grandmother paternal grandmother     Alcohol abuse Paternal Grandfather Grandfather     Asthma Maternal Grandmother Grandmother     Hernia Father Dad     Alcohol abuse Paternal Grandfather Grandfather    [4]   Social History  Tobacco Use    Smoking status: Never    Smokeless tobacco: Never   Vaping Use    Vaping status: Never Used   Substance Use Topics    Alcohol use: Not Currently     Comment: pt drinks an ocassional glass of wine    Drug use: Never        Babar Lawrence DO  07/27/25 0442

## 2025-07-27 NOTE — DISCHARGE INSTRUCTIONS
Thank you for allowing us to care for you today.  You may receive a survey regarding your visit today.    If you were satisfied with the care and service provided we would be very grateful if you would give us a high rating.    Your feedback is very important to us.    Dr. Lawrence        As of today's visit, based on reasonable likelihood, that it is safe for you to be discharged back to your residence to follow-up as an outpatient for ongoing management of your medical problem. You should follow-up with any referrals / primary provider as soon as possible. The contacts (number, addresses) are listed below.         Important:  Even though we think it is safe for you to go home, there is always a small chance that we are missing something that could require hospitalization.  Therefore it is very important that if you get worse or develops any new symptoms that you return here as soon as possible to be re-evaluated.  This includes return of symptoms that have resolved such as fainting, chest pain, or symptoms that could be warning signs for stroke important:  Even though we think it is safe for you to go home, there is always a small chance that we are missing something that could require hospitalization.  Therefore it is very important that if you get worse or develops any new symptoms that you return here as soon as possible to be re-evaluated.  This includes return of symptoms that have resolved such as fainting, chest pain, or symptoms that could be warning signs for stroke         Make sure your pharmacy and primary doctor is aware of any new medications prescribed today.          It is your responsibility to contact as soon as possible, and follow through with, any referrals you were given today. We do recommend you inform them you are a [facility] follow-up patient, as often they can better accommodate your need to be seen, provided their schedules allow. We will, and have, made every effort to ensure you have  access to adequate follow-up specialists available.          All problems may not be able to be fixed in one ER visit. This is why timely ongoing care is important, and this is a responsibility you share in. Further, you are free to follow up with any provider you choose, and this is not limited to our suggestion.          If cultures were obtained today, you will be contacted should anything result that would require further treatment. Please contact the ED at the number provided with questions.          Having trouble affording medications? Try [email or web address] [facility]! (This is not a hospital endorsed website, merely a recommendation based on my own personal experiences with [facility])

## 2025-07-28 RX ORDER — ATORVASTATIN CALCIUM 20 MG/1
20 TABLET, FILM COATED ORAL DAILY
Qty: 90 TABLET | Refills: 3 | Status: SHIPPED | OUTPATIENT
Start: 2025-07-28

## 2025-07-29 RX ORDER — TRAZODONE HYDROCHLORIDE 50 MG/1
50 TABLET ORAL NIGHTLY
Qty: 90 TABLET | Refills: 1 | Status: SHIPPED | OUTPATIENT
Start: 2025-07-29

## 2025-07-31 ENCOUNTER — APPOINTMENT (OUTPATIENT)
Dept: ENDOCRINOLOGY | Facility: CLINIC | Age: 60
End: 2025-07-31
Payer: COMMERCIAL

## 2025-08-20 ENCOUNTER — APPOINTMENT (OUTPATIENT)
Dept: SLEEP MEDICINE | Facility: CLINIC | Age: 60
End: 2025-08-20
Payer: COMMERCIAL

## 2025-08-20 VITALS
BODY MASS INDEX: 37.11 KG/M2 | HEIGHT: 72 IN | SYSTOLIC BLOOD PRESSURE: 130 MMHG | HEART RATE: 76 BPM | OXYGEN SATURATION: 97 % | WEIGHT: 274 LBS | DIASTOLIC BLOOD PRESSURE: 82 MMHG

## 2025-08-20 DIAGNOSIS — G47.30 SLEEP APNEA, UNSPECIFIED TYPE: Primary | ICD-10-CM

## 2025-08-20 DIAGNOSIS — G47.00 INSOMNIA, UNSPECIFIED TYPE: ICD-10-CM

## 2025-08-20 PROCEDURE — 3051F HG A1C>EQUAL 7.0%<8.0%: CPT | Performed by: INTERNAL MEDICINE

## 2025-08-20 PROCEDURE — 99204 OFFICE O/P NEW MOD 45 MIN: CPT | Performed by: INTERNAL MEDICINE

## 2025-08-20 PROCEDURE — 3079F DIAST BP 80-89 MM HG: CPT | Performed by: INTERNAL MEDICINE

## 2025-08-20 PROCEDURE — 3008F BODY MASS INDEX DOCD: CPT | Performed by: INTERNAL MEDICINE

## 2025-08-20 PROCEDURE — 3075F SYST BP GE 130 - 139MM HG: CPT | Performed by: INTERNAL MEDICINE

## 2025-08-20 PROCEDURE — 1036F TOBACCO NON-USER: CPT | Performed by: INTERNAL MEDICINE

## 2025-08-20 ASSESSMENT — SLEEP AND FATIGUE QUESTIONNAIRES
DIFFICULTY_FALLING_ASLEEP: MILD
DIFFICULTY_STAYING_ASLEEP: MODERATE
HOW LIKELY ARE YOU TO NOD OFF OR FALL ASLEEP WHILE LYING DOWN TO REST IN THE AFTERNOON WHEN CIRCUMSTANCES PERMIT: HIGH CHANCE OF DOZING
SITING INACTIVE IN A PUBLIC PLACE LIKE A CLASS ROOM OR A MOVIE THEATER: SLIGHT CHANCE OF DOZING
HOW LIKELY ARE YOU TO NOD OFF OR FALL ASLEEP WHEN YOU ARE A PASSENGER IN A CAR FOR AN HOUR WITHOUT A BREAK: SLIGHT CHANCE OF DOZING
SLEEP_PROBLEM_INTERFERES_DAILY_ACTIVITIES: MUCH
SATISFACTION_WITH_CURRENT_SLEEP_PATTERN: DISSATISFIED
HOW LIKELY ARE YOU TO NOD OFF OR FALL ASLEEP WHILE WATCHING TV: SLIGHT CHANCE OF DOZING
HOW LIKELY ARE YOU TO NOD OFF OR FALL ASLEEP IN A CAR, WHILE STOPPED FOR A FEW MINUTES IN TRAFFIC: WOULD NEVER DOZE
SLEEP_PROBLEM_NOTICEABLE_TO_OTHERS: VERY MUCH NOTICEABLE
WAKING_TOO_EARLY: MILD
WORRIED_DISTRESSED_DUE_TO_SLEEP: VERY MUCH NOTICEABLE
ESS-CHAD TOTAL SCORE: 7
HOW LIKELY ARE YOU TO NOD OFF OR FALL ASLEEP WHILE SITTING QUIETLY AFTER LUNCH WITHOUT ALCOHOL: WOULD NEVER DOZE
HOW LIKELY ARE YOU TO NOD OFF OR FALL ASLEEP WHILE SITTING AND TALKING TO SOMEONE: WOULD NEVER DOZE
HOW LIKELY ARE YOU TO NOD OFF OR FALL ASLEEP WHILE SITTING AND READING: SLIGHT CHANCE OF DOZING

## 2025-08-20 ASSESSMENT — LIFESTYLE VARIABLES
AUDIT-C TOTAL SCORE: 1
HOW OFTEN DO YOU HAVE SIX OR MORE DRINKS ON ONE OCCASION: NEVER
HOW OFTEN DO YOU HAVE A DRINK CONTAINING ALCOHOL: MONTHLY OR LESS
HOW MANY STANDARD DRINKS CONTAINING ALCOHOL DO YOU HAVE ON A TYPICAL DAY: 1 OR 2
SKIP TO QUESTIONS 9-10: 1

## 2025-08-20 ASSESSMENT — PAIN SCALES - GENERAL: PAINLEVEL_OUTOF10: 0-NO PAIN

## 2025-09-02 ENCOUNTER — PATIENT MESSAGE (OUTPATIENT)
Dept: PRIMARY CARE | Facility: CLINIC | Age: 60
End: 2025-09-02
Payer: COMMERCIAL

## 2025-09-02 DIAGNOSIS — J45.909 UNSPECIFIED ASTHMA, UNCOMPLICATED (HHS-HCC): ICD-10-CM

## 2025-09-02 DIAGNOSIS — E11.9 TYPE 2 DIABETES MELLITUS WITHOUT COMPLICATION, WITHOUT LONG-TERM CURRENT USE OF INSULIN: ICD-10-CM

## 2025-09-02 RX ORDER — GLIMEPIRIDE 2 MG/1
2 TABLET ORAL
Qty: 90 TABLET | Refills: 3 | Status: SHIPPED | OUTPATIENT
Start: 2025-09-02 | End: 2026-10-07

## 2025-09-02 RX ORDER — MONTELUKAST SODIUM 10 MG/1
10 TABLET ORAL EVERY EVENING
Qty: 90 TABLET | Refills: 1 | Status: SHIPPED | OUTPATIENT
Start: 2025-09-02

## (undated) DEVICE — Device

## (undated) DEVICE — SHEARS, CURVED 5MM, ENDOPATH

## (undated) DEVICE — SUTURE, PDS II, 2-0, 27 IN, CT-1 VIL MONO, LF

## (undated) DEVICE — DRAPE, SHEET, UTILITY, NON ABSORBENT, 18 X 26 IN, LF

## (undated) DEVICE — COUNTER, NEEDLE, FOAM BLOCK, POP-N-COUNT, W/BLADEGUARD, W/ADHESIVE 40 COUNT, RED

## (undated) DEVICE — TUBE SET, PNEUMOLAR HEATED, SMOKE EVACU, HIGH-FLOW

## (undated) DEVICE — HANDPIECE, POOLE SUCTION, W/O TUBING

## (undated) DEVICE — CANNULA REDUCER, DAVINCI XI

## (undated) DEVICE — SUTURE, PROLENE, 0, 30 IN, SH

## (undated) DEVICE — STAPLER,  LINEAR ENDO 60MM RELOAD, GREEN, DISP

## (undated) DEVICE — SEALER, VESSEL, EXTENDED

## (undated) DEVICE — DRAPE, SHEET, THREE QUARTER, FAN FOLD, 57 X 77 IN

## (undated) DEVICE — CARE KIT, LAPAROSCOPIC, ADVANCED

## (undated) DEVICE — OBTURATOR, BLADELESS , SU

## (undated) DEVICE — TOWEL, SURGICAL, NEURO, O/R, 16 X 26, BLUE, STERILE

## (undated) DEVICE — DRAPE, COLUMN, DAVINCI XI

## (undated) DEVICE — TUBING, SUCTION, CONNECTING, NON-CONDUCTIVE, FEMALE, 5 X 3.7 MM

## (undated) DEVICE — DRAPE, ARM XI

## (undated) DEVICE — SUTURE, VICRYL 0, 36 IN, CT-1, VIOLET

## (undated) DEVICE — BLADE, ULTRA CLEAN, BOVIE MOD TIP, 1IN

## (undated) DEVICE — LIGASURE, V SEALER/DIVIDER  5MM BLUNT TIP

## (undated) DEVICE — GOWN, SURGICAL, SMARTGOWN, XLARGE, STERILE

## (undated) DEVICE — SYRINGE, 12 CC, LUER LOCK, CONTROL, MONOJECT

## (undated) DEVICE — SYSTEM, TROCAR LAP, 5X100MM, SHIELD BLADED, KII ADVANCED FIX ABDOMINAL

## (undated) DEVICE — COVER, BACK TABLE, 65 X 90, HVY REINFORCED

## (undated) DEVICE — DRAPE, LEGGINGS, 28.5 X 43 IN, DISPOSABLE, LF, STERILE

## (undated) DEVICE — GLOVE, SURGICAL, PROTEXIS PI MICRO, 8.0, PF, LF

## (undated) DEVICE — SEAL, UNIVERSAL 5-8MM  XI

## (undated) DEVICE — SOLUTION, IRRIGATION, STERILE WATER, 1000 ML, POUR BOTTLE

## (undated) DEVICE — APPLIER, CLIP LARGE XI

## (undated) DEVICE — TUBING, SUCTION, CONNECTING, STERILE 0.25 X 120 IN., LF

## (undated) DEVICE — GLOVE, SURGICAL, PROTEXIS PI BLUE W/NEUTHERA, 8.0, PF, LF

## (undated) DEVICE — COVER, TIP HOT SHEARS ENDOWRIST

## (undated) DEVICE — STAPLER,  ECHELON CIRCULAR POWERED, 29MM, DISP

## (undated) DEVICE — SPONGE, LAP, XRAY DECT, 18IN X 18IN, W/MASTER DMT, STERILE

## (undated) DEVICE — PUMP, STRYKERFLOW 2 & HANDPIECE W/10FT. IRRIGATION TUBING

## (undated) DEVICE — CORD, MONOPOLARD, 10FT, DISP

## (undated) DEVICE — NERVE BLOCK, STIMUQUIK, SINGLE-SHOT, 21GA X 3-1/2

## (undated) DEVICE — SUTURE, MONOCRYL, 4-0, 18 IN, PS2, UNDYED

## (undated) DEVICE — NEEDLE, INSUFFLATION, 13GAX120MM, DISP

## (undated) DEVICE — TRAY, FOLEY, LUBRI-SIL, 16FR, COMPLETE CARE W/STATLOCK

## (undated) DEVICE — DRAIN, PENROSE, 5/8 IN X 18 IN, STERILE, LF

## (undated) DEVICE — SUTURE, VICRYL, 3-0, 27 IN, SH

## (undated) DEVICE — ELECTRODE, ELECTROSURGICAL, BLADE, NONSTICK, MODIFIED, 6.5 IN X 165 MM

## (undated) DEVICE — SCISSORS, MONOPOLAR, CURVED, 8MM

## (undated) DEVICE — CLIP, LIGATING, HEM-O-LOCK, MLX, LARGE, LF, PURPLE

## (undated) DEVICE — DRAPE, UNDERBUTTOCKS, W / 27IN FLUID POUCH

## (undated) DEVICE — STAPLER, ECHELON FLEX GST, POWERED PLUS, 60MM X 34CM, STANDARD